# Patient Record
Sex: FEMALE | Race: BLACK OR AFRICAN AMERICAN | URBAN - METROPOLITAN AREA
[De-identification: names, ages, dates, MRNs, and addresses within clinical notes are randomized per-mention and may not be internally consistent; named-entity substitution may affect disease eponyms.]

---

## 2023-06-05 ENCOUNTER — OFFICE VISIT (OUTPATIENT)
Dept: INTERNAL MEDICINE CLINIC | Facility: CLINIC | Age: 58
End: 2023-06-05
Payer: MEDICAID

## 2023-06-05 VITALS
OXYGEN SATURATION: 98 % | WEIGHT: 184.19 LBS | DIASTOLIC BLOOD PRESSURE: 78 MMHG | HEIGHT: 66 IN | BODY MASS INDEX: 29.6 KG/M2 | HEART RATE: 86 BPM | SYSTOLIC BLOOD PRESSURE: 138 MMHG | TEMPERATURE: 97 F | RESPIRATION RATE: 16 BRPM

## 2023-06-05 DIAGNOSIS — Z96.651 STATUS POST RIGHT KNEE REPLACEMENT: Primary | ICD-10-CM

## 2023-06-05 DIAGNOSIS — G47.00 INSOMNIA, UNSPECIFIED TYPE: ICD-10-CM

## 2023-06-05 DIAGNOSIS — M79.18 CHRONIC MUSCULOSKELETAL PAIN: ICD-10-CM

## 2023-06-05 DIAGNOSIS — G89.29 CHRONIC MUSCULOSKELETAL PAIN: ICD-10-CM

## 2023-06-05 DIAGNOSIS — E11.9 TYPE 2 DIABETES MELLITUS WITHOUT COMPLICATION, WITHOUT LONG-TERM CURRENT USE OF INSULIN (HCC): ICD-10-CM

## 2023-06-05 DIAGNOSIS — K21.9 GASTROESOPHAGEAL REFLUX DISEASE, UNSPECIFIED WHETHER ESOPHAGITIS PRESENT: ICD-10-CM

## 2023-06-05 DIAGNOSIS — E78.00 HYPERCHOLESTEREMIA: ICD-10-CM

## 2023-06-05 DIAGNOSIS — I10 PRIMARY HYPERTENSION: ICD-10-CM

## 2023-06-05 DIAGNOSIS — Z86.010 HISTORY OF COLON POLYPS: ICD-10-CM

## 2023-06-05 DIAGNOSIS — M17.0 PRIMARY OSTEOARTHRITIS OF BOTH KNEES: ICD-10-CM

## 2023-06-05 DIAGNOSIS — Z12.31 ENCOUNTER FOR SCREENING MAMMOGRAM FOR MALIGNANT NEOPLASM OF BREAST: ICD-10-CM

## 2023-06-05 DIAGNOSIS — J30.2 SEASONAL ALLERGIES: ICD-10-CM

## 2023-06-05 PROBLEM — Z86.0100 HISTORY OF COLON POLYPS: Status: ACTIVE | Noted: 2023-06-05

## 2023-06-05 LAB
CARTRIDGE LOT#: 567 NUMERIC
HEMOGLOBIN A1C: 6.7 % (ref 4.3–5.6)

## 2023-06-05 RX ORDER — METFORMIN HYDROCHLORIDE 500 MG/1
500 TABLET, EXTENDED RELEASE ORAL
COMMUNITY
Start: 2023-03-30 | End: 2023-06-05

## 2023-06-05 RX ORDER — ATORVASTATIN CALCIUM 40 MG/1
40 TABLET, FILM COATED ORAL DAILY
Qty: 90 TABLET | Refills: 0 | Status: SHIPPED | OUTPATIENT
Start: 2023-06-05

## 2023-06-05 RX ORDER — CELECOXIB 100 MG/1
100 CAPSULE ORAL DAILY PRN
Qty: 30 CAPSULE | Refills: 1 | Status: SHIPPED | OUTPATIENT
Start: 2023-06-05

## 2023-06-05 RX ORDER — TRIAMTERENE AND HYDROCHLOROTHIAZIDE 37.5; 25 MG/1; MG/1
1 TABLET ORAL DAILY
COMMUNITY
Start: 2023-03-30 | End: 2023-06-05

## 2023-06-05 RX ORDER — PANTOPRAZOLE SODIUM 40 MG/1
40 TABLET, DELAYED RELEASE ORAL DAILY
Qty: 90 TABLET | Refills: 0 | Status: SHIPPED | OUTPATIENT
Start: 2023-06-05

## 2023-06-05 RX ORDER — IBUPROFEN 800 MG/1
800 TABLET ORAL DAILY
COMMUNITY
Start: 2023-05-11 | End: 2023-06-05

## 2023-06-05 RX ORDER — HYDROXYZINE PAMOATE 25 MG/1
25 CAPSULE ORAL NIGHTLY PRN
Qty: 30 CAPSULE | Refills: 0 | Status: SHIPPED | OUTPATIENT
Start: 2023-06-05

## 2023-06-05 RX ORDER — HYDROXYZINE PAMOATE 25 MG/1
25 CAPSULE ORAL NIGHTLY
COMMUNITY
Start: 2023-03-30 | End: 2023-06-05

## 2023-06-05 RX ORDER — TRIAMTERENE AND HYDROCHLOROTHIAZIDE 37.5; 25 MG/1; MG/1
1 TABLET ORAL DAILY
Qty: 30 TABLET | Refills: 0 | Status: SHIPPED | OUTPATIENT
Start: 2023-06-05

## 2023-06-05 RX ORDER — ZOLPIDEM TARTRATE 12.5 MG/1
12.5 TABLET, FILM COATED, EXTENDED RELEASE ORAL DAILY
COMMUNITY
Start: 2023-05-01 | End: 2023-06-05

## 2023-06-05 RX ORDER — GABAPENTIN 100 MG/1
200 CAPSULE ORAL 2 TIMES DAILY
COMMUNITY
Start: 2023-04-17 | End: 2023-06-05

## 2023-06-05 RX ORDER — ZOLPIDEM TARTRATE 12.5 MG/1
12.5 TABLET, FILM COATED, EXTENDED RELEASE ORAL DAILY
Qty: 30 TABLET | Refills: 0 | Status: SHIPPED | OUTPATIENT
Start: 2023-06-05

## 2023-06-05 RX ORDER — ATORVASTATIN CALCIUM 40 MG/1
40 TABLET, FILM COATED ORAL DAILY
COMMUNITY
Start: 2023-05-30 | End: 2023-06-05

## 2023-06-05 RX ORDER — ALBUTEROL SULFATE 90 UG/1
2 AEROSOL, METERED RESPIRATORY (INHALATION) 4 TIMES DAILY PRN
Qty: 1 EACH | Refills: 0 | Status: SHIPPED | OUTPATIENT
Start: 2023-06-05

## 2023-06-05 RX ORDER — HYDROCODONE POLISTIREX AND CHLORPHENIRAMINE POLISTIREX 10; 8 MG/5ML; MG/5ML
5 SUSPENSION, EXTENDED RELEASE ORAL
COMMUNITY
Start: 2022-07-17 | End: 2023-06-05

## 2023-06-05 RX ORDER — MELOXICAM 7.5 MG/1
7.5 TABLET ORAL DAILY
COMMUNITY
Start: 2023-01-04 | End: 2023-06-05

## 2023-06-05 RX ORDER — ALBUTEROL SULFATE 90 UG/1
2 AEROSOL, METERED RESPIRATORY (INHALATION) 4 TIMES DAILY PRN
COMMUNITY
Start: 2023-05-02 | End: 2023-06-05

## 2023-06-05 RX ORDER — METFORMIN HYDROCHLORIDE 500 MG/1
500 TABLET, EXTENDED RELEASE ORAL
Qty: 90 TABLET | Refills: 0 | Status: SHIPPED | OUTPATIENT
Start: 2023-06-05

## 2023-06-05 RX ORDER — CETIRIZINE HYDROCHLORIDE 10 MG/1
10 TABLET ORAL DAILY
Qty: 90 TABLET | Refills: 0 | Status: SHIPPED | OUTPATIENT
Start: 2023-06-05

## 2023-06-05 RX ORDER — BENZONATATE 100 MG/1
100 CAPSULE ORAL 3 TIMES DAILY PRN
COMMUNITY
Start: 2023-05-02 | End: 2023-06-05

## 2023-06-05 RX ORDER — GABAPENTIN 100 MG/1
200 CAPSULE ORAL 2 TIMES DAILY
Qty: 120 CAPSULE | Refills: 0 | Status: SHIPPED | OUTPATIENT
Start: 2023-06-05 | End: 2023-07-05

## 2023-06-05 RX ORDER — PANTOPRAZOLE SODIUM 40 MG/1
40 TABLET, DELAYED RELEASE ORAL DAILY
COMMUNITY
Start: 2023-03-30 | End: 2023-06-05

## 2023-06-06 ENCOUNTER — TELEPHONE (OUTPATIENT)
Dept: INTERNAL MEDICINE CLINIC | Facility: CLINIC | Age: 58
End: 2023-06-06

## 2023-06-06 RX ORDER — FLUTICASONE PROPIONATE 50 MCG
1 SPRAY, SUSPENSION (ML) NASAL DAILY
Qty: 1 EACH | Refills: 1 | Status: SHIPPED | OUTPATIENT
Start: 2023-06-06

## 2023-06-06 NOTE — TELEPHONE ENCOUNTER
Called and spoke to pt. Informed her celecoxib was sent. Advised to try this for a few days. Also recommended PT, which pt is agreeable to. Was referred at [address] yesterday. Flonase sent. Pt stated understanding and agreed to plan. She also said she tried to schedule with GI, but provider was not covered. Advised to contact insurance to find covered provider. Pt said she will call them tomorrow and call back with new GI provider.

## 2023-06-06 NOTE — TELEPHONE ENCOUNTER
Pt states that at yesterday's OV MP was supposed to send in pain medication for her legs and sciatica and flonase for her. She would like a callback.       Isaac Ennis [facility], [address] [place] [address] [phone], [phone]

## 2023-06-06 NOTE — TELEPHONE ENCOUNTER
Was there a specific medication that she is requesting? Rx sent for NSAID Celecoxib. Let's give that a try for a few days. In addition, is she interested in physical therapy? The could help too. Rx for Flonase sent.

## 2023-06-07 NOTE — TELEPHONE ENCOUNTER
[phone] they received the medication Celebrex and there is an interaction between Celebrex and Maxzide it could cause sudden onset nephro toxicity. She stated this is her 3rd call to the office.

## 2023-06-08 RX ORDER — METHYLPREDNISOLONE 4 MG/1
TABLET ORAL
Qty: 1 EACH | Refills: 0 | Status: SHIPPED | OUTPATIENT
Start: 2023-06-08 | End: 2023-06-14 | Stop reason: ALTCHOICE

## 2023-06-08 NOTE — TELEPHONE ENCOUNTER
Called and spoke to pt. Informed her medrol dose pack has been sent. Advised to f/u next week on chronic pain control. Pt said that she will be out of town and is not able to come in until 6/21.  Pt scheduled on 6/21 with [facility].

## 2023-06-08 NOTE — TELEPHONE ENCOUNTER
I spoke with pharmacist. Given interaction between her BP medication and Celebrex, let's try something else for her pain. I am still waiting for her labs/outside records to be sent over as well. I will send a Medrol DosePak for her sciatica. Can she follow up with me next week to follow up on chronic pain control, please?

## 2023-06-12 ENCOUNTER — TELEPHONE (OUTPATIENT)
Dept: ORTHOPEDICS CLINIC | Facility: CLINIC | Age: 58
End: 2023-06-12

## 2023-06-12 DIAGNOSIS — M25.562 PAIN IN BOTH KNEES, UNSPECIFIED CHRONICITY: Primary | ICD-10-CM

## 2023-06-12 DIAGNOSIS — M25.561 PAIN IN BOTH KNEES, UNSPECIFIED CHRONICITY: Primary | ICD-10-CM

## 2023-06-12 NOTE — TELEPHONE ENCOUNTER
called patient to get more information       Patient states she has bilateral knee replacement she states she gets gel injections to the left knee    She states right knee was previously replaced and had to be completed twice last SX was 2020 done in [place]. I asked patient to bring in any records and images she may have . Patient states she will give that office a call to get information     Is this appointment appropriate to see you ?

## 2023-06-12 NOTE — TELEPHONE ENCOUNTER
Patient has an upcoming appt for bilateral knee pain/ status of post right knee replacement. At this time patient has not had any imaging done, please place RX accordingly. I have notified the patient to come in 20 min prior to appointment time to have the imaging done . Please forward to the  pool to schedule imaging. Thank you.       Future Appointments   Date Time Provider Gabe Middleton   6/14/2023  3:00 PM Nato Valle,  [facility]   6/27/2023  1:30 PM Christy Young PA-C [facility] OR[facility]O  [facility]

## 2023-06-13 NOTE — TELEPHONE ENCOUNTER
xr's ordered for upcoming appointment please call patient to come at 1:00 pm to have images done first before seeing Modesta DAVID

## 2023-06-14 ENCOUNTER — LAB ENCOUNTER (OUTPATIENT)
Dept: LAB | Facility: HOSPITAL | Age: 58
End: 2023-06-14
Attending: INTERNAL MEDICINE
Payer: MEDICAID

## 2023-06-14 ENCOUNTER — OFFICE VISIT (OUTPATIENT)
Dept: INTERNAL MEDICINE CLINIC | Facility: CLINIC | Age: 58
End: 2023-06-14
Payer: MEDICAID

## 2023-06-14 VITALS
TEMPERATURE: 97 F | HEART RATE: 80 BPM | BODY MASS INDEX: 30.05 KG/M2 | WEIGHT: 187 LBS | HEIGHT: 66 IN | DIASTOLIC BLOOD PRESSURE: 68 MMHG | SYSTOLIC BLOOD PRESSURE: 102 MMHG

## 2023-06-14 DIAGNOSIS — Z13.29 SCREENING FOR THYROID DISORDER: ICD-10-CM

## 2023-06-14 DIAGNOSIS — Z13.0 SCREENING FOR BLOOD DISEASE: ICD-10-CM

## 2023-06-14 DIAGNOSIS — R25.2 MUSCLE CRAMPS: ICD-10-CM

## 2023-06-14 DIAGNOSIS — Z13.228 SCREENING FOR METABOLIC DISORDER: ICD-10-CM

## 2023-06-14 DIAGNOSIS — M79.18 CHRONIC MUSCULOSKELETAL PAIN: ICD-10-CM

## 2023-06-14 DIAGNOSIS — G89.29 CHRONIC MUSCULOSKELETAL PAIN: ICD-10-CM

## 2023-06-14 DIAGNOSIS — E11.9 TYPE 2 DIABETES MELLITUS WITHOUT COMPLICATION, WITHOUT LONG-TERM CURRENT USE OF INSULIN (HCC): ICD-10-CM

## 2023-06-14 DIAGNOSIS — M06.9 RHEUMATOID ARTHRITIS, INVOLVING UNSPECIFIED SITE, UNSPECIFIED WHETHER RHEUMATOID FACTOR PRESENT (HCC): ICD-10-CM

## 2023-06-14 DIAGNOSIS — M15.9 PRIMARY OSTEOARTHRITIS INVOLVING MULTIPLE JOINTS: ICD-10-CM

## 2023-06-14 DIAGNOSIS — Z87.39 HISTORY OF INFLAMMATORY ARTHRITIS: ICD-10-CM

## 2023-06-14 DIAGNOSIS — R10.9 ABDOMINAL PAIN, UNSPECIFIED ABDOMINAL LOCATION: ICD-10-CM

## 2023-06-14 DIAGNOSIS — R35.0 URINARY FREQUENCY: ICD-10-CM

## 2023-06-14 DIAGNOSIS — Z13.0 SCREENING FOR BLOOD DISEASE: Primary | ICD-10-CM

## 2023-06-14 DIAGNOSIS — Z86.73 HISTORY OF CVA (CEREBROVASCULAR ACCIDENT): ICD-10-CM

## 2023-06-14 LAB
ALBUMIN SERPL-MCNC: 4.1 G/DL (ref 3.4–5)
ALBUMIN/GLOB SERPL: 0.9 {RATIO} (ref 1–2)
ALP LIVER SERPL-CCNC: 108 U/L
ALT SERPL-CCNC: 19 U/L
ANION GAP SERPL CALC-SCNC: 8 MMOL/L (ref 0–18)
AST SERPL-CCNC: 7 U/L (ref 15–37)
BASOPHILS # BLD AUTO: 0.04 X10(3) UL (ref 0–0.2)
BASOPHILS NFR BLD AUTO: 0.4 %
BILIRUB SERPL-MCNC: 0.5 MG/DL (ref 0.1–2)
BILIRUB UR QL STRIP.AUTO: NEGATIVE
BUN BLD-MCNC: 13 MG/DL (ref 7–18)
CALCIUM BLD-MCNC: 9.7 MG/DL (ref 8.5–10.1)
CHLORIDE SERPL-SCNC: 102 MMOL/L (ref 98–112)
CLARITY UR REFRACT.AUTO: CLEAR
CO2 SERPL-SCNC: 26 MMOL/L (ref 21–32)
COLOR UR AUTO: YELLOW
CREAT BLD-MCNC: 0.94 MG/DL
CRP SERPL-MCNC: 2.09 MG/DL (ref ?–0.3)
EOSINOPHIL # BLD AUTO: 0.24 X10(3) UL (ref 0–0.7)
EOSINOPHIL NFR BLD AUTO: 2.2 %
ERYTHROCYTE [DISTWIDTH] IN BLOOD BY AUTOMATED COUNT: 12.9 %
ERYTHROCYTE [SEDIMENTATION RATE] IN BLOOD: 48 MM/HR
FASTING STATUS PATIENT QL REPORTED: NO
GFR SERPLBLD BASED ON 1.73 SQ M-ARVRAT: 69 ML/MIN/1.73M2 (ref 60–?)
GLOBULIN PLAS-MCNC: 4.5 G/DL (ref 2.8–4.4)
GLUCOSE BLD-MCNC: 98 MG/DL (ref 70–99)
GLUCOSE UR STRIP.AUTO-MCNC: NEGATIVE MG/DL
HCT VFR BLD AUTO: 40.3 %
HGB BLD-MCNC: 12.9 G/DL
IMM GRANULOCYTES # BLD AUTO: 0.04 X10(3) UL (ref 0–1)
IMM GRANULOCYTES NFR BLD: 0.4 %
KETONES UR STRIP.AUTO-MCNC: NEGATIVE MG/DL
LEUKOCYTE ESTERASE UR QL STRIP.AUTO: NEGATIVE
LYMPHOCYTES # BLD AUTO: 2.92 X10(3) UL (ref 1–4)
LYMPHOCYTES NFR BLD AUTO: 26.2 %
MAGNESIUM SERPL-MCNC: 2.3 MG/DL (ref 1.6–2.6)
MCH RBC QN AUTO: 27.8 PG (ref 26–34)
MCHC RBC AUTO-ENTMCNC: 32 G/DL (ref 31–37)
MCV RBC AUTO: 86.9 FL
MONOCYTES # BLD AUTO: 0.79 X10(3) UL (ref 0.1–1)
MONOCYTES NFR BLD AUTO: 7.1 %
NEUTROPHILS # BLD AUTO: 7.13 X10 (3) UL (ref 1.5–7.7)
NEUTROPHILS # BLD AUTO: 7.13 X10(3) UL (ref 1.5–7.7)
NEUTROPHILS NFR BLD AUTO: 63.7 %
NITRITE UR QL STRIP.AUTO: NEGATIVE
OSMOLALITY SERPL CALC.SUM OF ELEC: 282 MOSM/KG (ref 275–295)
PH UR STRIP.AUTO: 5 [PH] (ref 5–8)
PLATELET # BLD AUTO: 463 10(3)UL (ref 150–450)
POTASSIUM SERPL-SCNC: 3.7 MMOL/L (ref 3.5–5.1)
PROT SERPL-MCNC: 8.6 G/DL (ref 6.4–8.2)
PROT UR STRIP.AUTO-MCNC: NEGATIVE MG/DL
RBC # BLD AUTO: 4.64 X10(6)UL
RBC UR QL AUTO: NEGATIVE
RHEUMATOID FACT SERPL-ACNC: <10 IU/ML (ref ?–15)
SODIUM SERPL-SCNC: 136 MMOL/L (ref 136–145)
SP GR UR STRIP.AUTO: 1.02 (ref 1–1.03)
TSI SER-ACNC: 1.78 MIU/ML (ref 0.36–3.74)
UROBILINOGEN UR STRIP.AUTO-MCNC: <2 MG/DL
WBC # BLD AUTO: 11.2 X10(3) UL (ref 4–11)

## 2023-06-14 PROCEDURE — 83735 ASSAY OF MAGNESIUM: CPT

## 2023-06-14 PROCEDURE — 84443 ASSAY THYROID STIM HORMONE: CPT

## 2023-06-14 PROCEDURE — 86431 RHEUMATOID FACTOR QUANT: CPT

## 2023-06-14 PROCEDURE — 86038 ANTINUCLEAR ANTIBODIES: CPT

## 2023-06-14 PROCEDURE — 81003 URINALYSIS AUTO W/O SCOPE: CPT

## 2023-06-14 PROCEDURE — 86225 DNA ANTIBODY NATIVE: CPT

## 2023-06-14 PROCEDURE — 36415 COLL VENOUS BLD VENIPUNCTURE: CPT

## 2023-06-14 PROCEDURE — 85652 RBC SED RATE AUTOMATED: CPT

## 2023-06-14 PROCEDURE — 86140 C-REACTIVE PROTEIN: CPT

## 2023-06-14 PROCEDURE — 85025 COMPLETE CBC W/AUTO DIFF WBC: CPT

## 2023-06-14 PROCEDURE — 80053 COMPREHEN METABOLIC PANEL: CPT

## 2023-06-14 RX ORDER — GABAPENTIN 100 MG/1
100 CAPSULE ORAL 3 TIMES DAILY
Qty: 90 CAPSULE | Refills: 0 | Status: SHIPPED | OUTPATIENT
Start: 2023-06-14 | End: 2023-07-14

## 2023-06-14 RX ORDER — HYDROCODONE BITARTRATE AND ACETAMINOPHEN 5; 325 MG/1; MG/1
1 TABLET ORAL DAILY PRN
Qty: 15 TABLET | Refills: 0 | Status: SHIPPED | OUTPATIENT
Start: 2023-06-14

## 2023-06-14 NOTE — TELEPHONE ENCOUNTER
Future Appointments   Date Time Provider Gabe Emi   6/14/2023  3:00 PM Adali Valle, DO [facility] [facility]   6/27/2023  1:00 PM PFS XR RM1 PFS XRAY [facility]   6/27/2023  1:15 PM PFS XR RM1 PFS XRAY [facility]   6/27/2023  1:30 PM Lana Blair PA-C [facility] ORTHO PL [facility]       Spoke to patient will come in early for xray prior to appt.

## 2023-06-15 ENCOUNTER — TELEPHONE (OUTPATIENT)
Dept: INTERNAL MEDICINE CLINIC | Facility: CLINIC | Age: 58
End: 2023-06-15

## 2023-06-15 LAB
DSDNA IGG SERPL IA-ACNC: 1.5 IU/ML
ENA AB SER QL IA: 0.2 UG/L
ENA AB SER QL IA: NEGATIVE

## 2023-06-16 ENCOUNTER — TELEPHONE (OUTPATIENT)
Dept: INTERNAL MEDICINE CLINIC | Facility: CLINIC | Age: 58
End: 2023-06-16

## 2023-06-17 PROBLEM — M15.9 PRIMARY OSTEOARTHRITIS INVOLVING MULTIPLE JOINTS: Status: ACTIVE | Noted: 2023-06-17

## 2023-06-17 PROBLEM — Z86.73 HISTORY OF CVA (CEREBROVASCULAR ACCIDENT): Status: ACTIVE | Noted: 2023-06-17

## 2023-06-17 PROBLEM — Z87.39 HISTORY OF INFLAMMATORY ARTHRITIS: Status: ACTIVE | Noted: 2023-06-17

## 2023-06-17 PROBLEM — M15.0 PRIMARY OSTEOARTHRITIS INVOLVING MULTIPLE JOINTS: Status: ACTIVE | Noted: 2023-06-17

## 2023-06-26 ENCOUNTER — OFFICE VISIT (OUTPATIENT)
Dept: INTERNAL MEDICINE CLINIC | Facility: CLINIC | Age: 58
End: 2023-06-26
Payer: MEDICAID

## 2023-06-26 VITALS
TEMPERATURE: 98 F | DIASTOLIC BLOOD PRESSURE: 64 MMHG | RESPIRATION RATE: 16 BRPM | OXYGEN SATURATION: 96 % | HEART RATE: 87 BPM | BODY MASS INDEX: 29.67 KG/M2 | WEIGHT: 184.63 LBS | HEIGHT: 66 IN | SYSTOLIC BLOOD PRESSURE: 126 MMHG

## 2023-06-26 DIAGNOSIS — R53.83 OTHER FATIGUE: ICD-10-CM

## 2023-06-26 DIAGNOSIS — E11.9 TYPE 2 DIABETES MELLITUS WITHOUT COMPLICATION, WITHOUT LONG-TERM CURRENT USE OF INSULIN (HCC): ICD-10-CM

## 2023-06-26 DIAGNOSIS — R77.8 ELEVATED TOTAL PROTEIN: ICD-10-CM

## 2023-06-26 DIAGNOSIS — D72.829 LEUKOCYTOSIS, UNSPECIFIED TYPE: ICD-10-CM

## 2023-06-26 DIAGNOSIS — D69.6 THROMBOCYTOPENIA (HCC): ICD-10-CM

## 2023-06-26 DIAGNOSIS — Z86.39 HISTORY OF VITAMIN D DEFICIENCY: ICD-10-CM

## 2023-06-26 DIAGNOSIS — K57.92 DIVERTICULITIS: Primary | ICD-10-CM

## 2023-06-26 DIAGNOSIS — Z12.11 SCREEN FOR COLON CANCER: ICD-10-CM

## 2023-06-26 DIAGNOSIS — Z86.39 HISTORY OF IRON DEFICIENCY: ICD-10-CM

## 2023-06-26 PROCEDURE — 3008F BODY MASS INDEX DOCD: CPT | Performed by: INTERNAL MEDICINE

## 2023-06-26 PROCEDURE — 99214 OFFICE O/P EST MOD 30 MIN: CPT | Performed by: INTERNAL MEDICINE

## 2023-06-26 PROCEDURE — 3078F DIAST BP <80 MM HG: CPT | Performed by: INTERNAL MEDICINE

## 2023-06-26 PROCEDURE — 3074F SYST BP LT 130 MM HG: CPT | Performed by: INTERNAL MEDICINE

## 2023-06-26 RX ORDER — SENNA AND DOCUSATE SODIUM 50; 8.6 MG/1; MG/1
1 TABLET, FILM COATED ORAL DAILY
Qty: 90 TABLET | Refills: 0 | Status: SHIPPED | OUTPATIENT
Start: 2023-06-26

## 2023-06-26 RX ORDER — AMOXICILLIN AND CLAVULANATE POTASSIUM 875; 125 MG/1; MG/1
1 TABLET, FILM COATED ORAL EVERY 8 HOURS
COMMUNITY
Start: 2023-06-19

## 2023-06-27 ENCOUNTER — LAB ENCOUNTER (OUTPATIENT)
Dept: LAB | Age: 58
End: 2023-06-27
Attending: INTERNAL MEDICINE
Payer: MEDICAID

## 2023-06-27 ENCOUNTER — OFFICE VISIT (OUTPATIENT)
Facility: CLINIC | Age: 58
End: 2023-06-27
Payer: MEDICAID

## 2023-06-27 ENCOUNTER — HOSPITAL ENCOUNTER (OUTPATIENT)
Dept: GENERAL RADIOLOGY | Age: 58
Discharge: HOME | End: 2023-06-27
Attending: PHYSICIAN ASSISTANT
Payer: MEDICAID

## 2023-06-27 VITALS — WEIGHT: 184 LBS | BODY MASS INDEX: 29.57 KG/M2 | HEIGHT: 66 IN

## 2023-06-27 DIAGNOSIS — E11.9 TYPE 2 DIABETES MELLITUS WITHOUT COMPLICATION, WITHOUT LONG-TERM CURRENT USE OF INSULIN (HCC): ICD-10-CM

## 2023-06-27 DIAGNOSIS — M17.12 PRIMARY OSTEOARTHRITIS OF LEFT KNEE: Primary | ICD-10-CM

## 2023-06-27 DIAGNOSIS — M25.562 PAIN IN BOTH KNEES, UNSPECIFIED CHRONICITY: ICD-10-CM

## 2023-06-27 DIAGNOSIS — Z86.39 HISTORY OF IRON DEFICIENCY: ICD-10-CM

## 2023-06-27 DIAGNOSIS — R53.83 OTHER FATIGUE: ICD-10-CM

## 2023-06-27 DIAGNOSIS — G89.29 CHRONIC KNEE PAIN AFTER TOTAL REPLACEMENT OF RIGHT KNEE JOINT: ICD-10-CM

## 2023-06-27 DIAGNOSIS — M25.561 CHRONIC KNEE PAIN AFTER TOTAL REPLACEMENT OF RIGHT KNEE JOINT: ICD-10-CM

## 2023-06-27 DIAGNOSIS — M25.561 PAIN IN BOTH KNEES, UNSPECIFIED CHRONICITY: ICD-10-CM

## 2023-06-27 DIAGNOSIS — Z86.39 HISTORY OF VITAMIN D DEFICIENCY: ICD-10-CM

## 2023-06-27 DIAGNOSIS — Z96.651 CHRONIC KNEE PAIN AFTER TOTAL REPLACEMENT OF RIGHT KNEE JOINT: ICD-10-CM

## 2023-06-27 LAB
CREAT UR-SCNC: 200 MG/DL
DEPRECATED HBV CORE AB SER IA-ACNC: 49 NG/ML
IRON SATN MFR SERPL: 20 %
IRON SERPL-MCNC: 69 UG/DL
MICROALBUMIN UR-MCNC: 0.96 MG/DL
MICROALBUMIN/CREAT 24H UR-RTO: 4.8 UG/MG (ref ?–30)
TIBC SERPL-MCNC: 343 UG/DL (ref 240–450)
TRANSFERRIN SERPL-MCNC: 230 MG/DL (ref 200–360)
VIT B12 SERPL-MCNC: 636 PG/ML (ref 193–986)

## 2023-06-27 PROCEDURE — 20610 DRAIN/INJ JOINT/BURSA W/O US: CPT | Performed by: PHYSICIAN ASSISTANT

## 2023-06-27 PROCEDURE — 73564 X-RAY EXAM KNEE 4 OR MORE: CPT | Performed by: PHYSICIAN ASSISTANT

## 2023-06-27 PROCEDURE — 83550 IRON BINDING TEST: CPT

## 2023-06-27 PROCEDURE — 82728 ASSAY OF FERRITIN: CPT

## 2023-06-27 PROCEDURE — 99204 OFFICE O/P NEW MOD 45 MIN: CPT | Performed by: PHYSICIAN ASSISTANT

## 2023-06-27 PROCEDURE — 36415 COLL VENOUS BLD VENIPUNCTURE: CPT

## 2023-06-27 PROCEDURE — 73562 X-RAY EXAM OF KNEE 3: CPT | Performed by: PHYSICIAN ASSISTANT

## 2023-06-27 PROCEDURE — 82043 UR ALBUMIN QUANTITATIVE: CPT

## 2023-06-27 PROCEDURE — 82306 VITAMIN D 25 HYDROXY: CPT

## 2023-06-27 PROCEDURE — 3008F BODY MASS INDEX DOCD: CPT | Performed by: PHYSICIAN ASSISTANT

## 2023-06-27 PROCEDURE — 82607 VITAMIN B-12: CPT

## 2023-06-27 PROCEDURE — 3061F NEG MICROALBUMINURIA REV: CPT | Performed by: INTERNAL MEDICINE

## 2023-06-27 PROCEDURE — 83540 ASSAY OF IRON: CPT

## 2023-06-27 PROCEDURE — 82570 ASSAY OF URINE CREATININE: CPT

## 2023-06-27 RX ORDER — TRIAMCINOLONE ACETONIDE 40 MG/ML
40 INJECTION, SUSPENSION INTRA-ARTICULAR; INTRAMUSCULAR ONCE
Status: COMPLETED | OUTPATIENT
Start: 2023-06-27 | End: 2023-06-27

## 2023-06-27 RX ADMIN — TRIAMCINOLONE ACETONIDE 40 MG: 40 INJECTION, SUSPENSION INTRA-ARTICULAR; INTRAMUSCULAR at 14:23:00

## 2023-06-28 DIAGNOSIS — E55.9 VITAMIN D DEFICIENCY: Primary | ICD-10-CM

## 2023-06-28 LAB — VIT D+METAB SERPL-MCNC: 16.7 NG/ML (ref 30–100)

## 2023-06-28 RX ORDER — ERGOCALCIFEROL 1.25 MG/1
50000 CAPSULE ORAL WEEKLY
Qty: 12 CAPSULE | Refills: 0 | Status: SHIPPED | OUTPATIENT
Start: 2023-06-28 | End: 2023-09-14

## 2023-06-29 ENCOUNTER — TELEPHONE (OUTPATIENT)
Dept: INTERNAL MEDICINE CLINIC | Facility: CLINIC | Age: 58
End: 2023-06-29

## 2023-06-30 ENCOUNTER — DOCUMENTATION ONLY (OUTPATIENT)
Dept: INTERNAL MEDICINE CLINIC | Facility: CLINIC | Age: 58
End: 2023-06-30

## 2023-07-05 ENCOUNTER — TELEPHONE (OUTPATIENT)
Dept: INTERNAL MEDICINE CLINIC | Facility: CLINIC | Age: 58
End: 2023-07-05

## 2023-07-05 NOTE — TELEPHONE ENCOUNTER
Disp Refills Start End    atorvastatin 40 MG Oral Tab 90 tablet 0 6/5/2023     Sig - Route: Take 1 tablet (40 mg total) by mouth daily. - Oral    Sent to pharmacy as:  Atorvastatin Calcium 40 MG Oral Tablet (Lipitor)    E-Prescribing Status: Receipt confirmed by pharmacy (6/5/2023  4:15 PM CDT)    Prior authorization: Waiting for Auth Details

## 2023-07-13 ENCOUNTER — OFFICE VISIT (OUTPATIENT)
Dept: HEMATOLOGY/ONCOLOGY | Facility: HOSPITAL | Age: 58
End: 2023-07-13
Attending: INTERNAL MEDICINE
Payer: MEDICAID

## 2023-07-13 VITALS
HEIGHT: 65.98 IN | BODY MASS INDEX: 29.76 KG/M2 | SYSTOLIC BLOOD PRESSURE: 118 MMHG | HEART RATE: 66 BPM | TEMPERATURE: 97 F | DIASTOLIC BLOOD PRESSURE: 76 MMHG | WEIGHT: 185.19 LBS | OXYGEN SATURATION: 98 % | RESPIRATION RATE: 18 BRPM

## 2023-07-13 DIAGNOSIS — K59.00 CONSTIPATION, UNSPECIFIED CONSTIPATION TYPE: ICD-10-CM

## 2023-07-13 DIAGNOSIS — D75.839 THROMBOCYTOSIS: ICD-10-CM

## 2023-07-13 DIAGNOSIS — Z71.6 ENCOUNTER FOR SMOKING CESSATION COUNSELING: ICD-10-CM

## 2023-07-13 DIAGNOSIS — D72.829 LEUKOCYTOSIS: Primary | ICD-10-CM

## 2023-07-13 PROCEDURE — 99205 OFFICE O/P NEW HI 60 MIN: CPT | Performed by: INTERNAL MEDICINE

## 2023-07-13 NOTE — PROGRESS NOTES
Education Record    Learner:  Patient and Family Member    Disease / Diagnosis:    Barriers / Limitations:  None   Comments:    Method:  Discussion   Comments:    General Topics:  Pain, Side effects and symptom management, and Plan of care reviewed   Comments:    Outcome:  Shows understanding   Comments:    Here for new consult. Currently her only complaint is abdominal pain, constipation. She has had a lot of abdominal symptoms lately- she even went to the ER and had Diverticulitis.

## 2023-07-17 ENCOUNTER — TELEPHONE (OUTPATIENT)
Dept: PHYSICAL THERAPY | Facility: HOSPITAL | Age: 58
End: 2023-07-17

## 2023-07-17 ENCOUNTER — OFFICE VISIT (OUTPATIENT)
Facility: LOCATION | Age: 58
End: 2023-07-17
Payer: MEDICAID

## 2023-07-17 VITALS — HEART RATE: 80 BPM | TEMPERATURE: 97 F

## 2023-07-17 DIAGNOSIS — R10.31 RLQ ABDOMINAL PAIN: Primary | ICD-10-CM

## 2023-07-17 DIAGNOSIS — K59.00 CONSTIPATION, UNSPECIFIED CONSTIPATION TYPE: ICD-10-CM

## 2023-07-17 RX ORDER — POLYETHYLENE GLYCOL 3350, SODIUM CHLORIDE, SODIUM BICARBONATE, POTASSIUM CHLORIDE 420; 11.2; 5.72; 1.48 G/4L; G/4L; G/4L; G/4L
POWDER, FOR SOLUTION ORAL
Qty: 1 EACH | Refills: 0 | Status: SHIPPED | OUTPATIENT
Start: 2023-07-17

## 2023-07-19 ENCOUNTER — TELEPHONE (OUTPATIENT)
Facility: LOCATION | Age: 58
End: 2023-07-19

## 2023-07-19 RX ORDER — AMOXICILLIN AND CLAVULANATE POTASSIUM 875; 125 MG/1; MG/1
1 TABLET, FILM COATED ORAL 2 TIMES DAILY
Qty: 20 TABLET | Refills: 0 | Status: ON HOLD | OUTPATIENT
Start: 2023-07-19 | End: 2023-07-29

## 2023-07-19 NOTE — TELEPHONE ENCOUNTER
S/w pt and pt's daughter Zeferino Narvaez for a follow up after receiving records from Emergency room visit on 7/18. Per Dr Casandra Stauffer patient does not need  complete scheduled CT of abd and pelvis d/t having one completed at the ER. Pt should remain on a low residual diet and complete the course of antibiotics prescribed at the ER and disregard Augmentin rx sent by Dr. Casandra Stauffer. Verbalized understanding.      Future Appointments   Date Time Provider Gabe Midldeton   7/21/2023  9:15 AM BBK CT 1 BBK CT [place]   8/9/2023  2:15 PM Terence Allen, PT [facility]   8/11/2023 10:30 AM Manoj Garrison, PT [facility]   8/14/2023  2:15 PM Terence Allen, PT [facility] PHYS [address]   8/16/2023 10:15 AM Claudine Da Silva MD [facility]   8/16/2023  2:15 PM Terence Allen, PT [facility]   8/21/2023  2:15 PM Terence Allen, PT [facility] PHYS [address]

## 2023-07-19 NOTE — TELEPHONE ENCOUNTER
Pt called because she went into [facility] ER, and she wants us to get a copy of her medical records. Gave number for [phone] to request the notes from her ER visit.

## 2023-07-20 ENCOUNTER — TELEPHONE (OUTPATIENT)
Dept: INTERNAL MEDICINE CLINIC | Facility: CLINIC | Age: 58
End: 2023-07-20

## 2023-07-20 NOTE — TELEPHONE ENCOUNTER
I think that's on old ER note. The signature is from 2022.  I received record in my basket yesterday with CT scan but no Rx for Norco.

## 2023-07-20 NOTE — TELEPHONE ENCOUNTER
Patient was in the ER on Tuesday and was given a script for [place].    Her [facility] was out of the [place] 5 mg. Would  send a prescription for Norco 10 mg be sent over to the [facility] by her in [address]? ?

## 2023-07-20 NOTE — TELEPHONE ENCOUNTER
Misread date on ER note in care everywhere, note is from 7/17/22, not 23. LMTCB 7/20 to find out why she was in ER and where as do not see in care everywhere.

## 2023-07-20 NOTE — TELEPHONE ENCOUNTER
Found ER note in care everywhere:    ----------------------- IMPRESSION AND DISPOSITION --------------------    CLINICAL IMPRESSION  1. Coronavirus infection  2. Screening for cardiovascular condition  3. Atypical pneumonia  4. Abdominal pain, unspecified abdominal location    DISPOSITION  Discharge to home    Prescriptions:  New Prescriptions  ALBUTEROL 90 MCG/ACTUATION INHL INHALER Inhale 2 puffs into the lungs every 4 (four) hours as needed for Wheezing or Shortness of Breath. DOXYCYCLINE (VIBRAMYCIN) 100 MG CAPSULE Take 1 capsule (100 mg total) by mouth 2 (two) times daily for 10 days. HYDROCODONE-CHLORPHENIRAMINE (TUSSIONEX) 10-8 MG/5 ML SU12 Take 5 mLs by mouth 2 (two) times daily as needed for Cough. ONDANSETRON (ZOFRAN-ODT) 4 MG DISINTEGRATING TABLET Take 1 tablet (4 mg total) by mouth every 8 (eight) hours as needed for Nausea. Patient condition:  Stable      [id number] [facility], DO  08/02/22 0817      Do not see Fronie Kite was prescribed, so called [facility] to see if there was rx. Per [facility] only rx they have is for abx, but pain meds may not have been entered as they are on backorder. They are out of 5 mg and 10 mg Norco.     MP, looked through ER note in care everywhere and do not see Fronie Kite was prescribed. Do you see anything differently?

## 2023-07-21 ENCOUNTER — APPOINTMENT (OUTPATIENT)
Dept: CT IMAGING | Facility: HOSPITAL | Age: 58
DRG: 392 | End: 2023-07-21
Attending: EMERGENCY MEDICINE
Payer: MEDICAID

## 2023-07-21 ENCOUNTER — HOSPITAL ENCOUNTER (INPATIENT)
Facility: HOSPITAL | Age: 58
LOS: 5 days | Discharge: HOME | DRG: 392 | End: 2023-07-26
Attending: EMERGENCY MEDICINE | Admitting: INTERNAL MEDICINE
Payer: MEDICAID

## 2023-07-21 DIAGNOSIS — K57.92 ACUTE DIVERTICULITIS: Primary | ICD-10-CM

## 2023-07-21 DIAGNOSIS — M15.9 PRIMARY OSTEOARTHRITIS INVOLVING MULTIPLE JOINTS: ICD-10-CM

## 2023-07-21 DIAGNOSIS — G89.29 CHRONIC MUSCULOSKELETAL PAIN: ICD-10-CM

## 2023-07-21 DIAGNOSIS — M79.18 CHRONIC MUSCULOSKELETAL PAIN: ICD-10-CM

## 2023-07-21 LAB
ALBUMIN SERPL-MCNC: 4.1 G/DL (ref 3.4–5)
ALBUMIN/GLOB SERPL: 0.9 {RATIO} (ref 1–2)
ALP LIVER SERPL-CCNC: 101 U/L
ALT SERPL-CCNC: 14 U/L
ANION GAP SERPL CALC-SCNC: 7 MMOL/L (ref 0–18)
AST SERPL-CCNC: 13 U/L (ref 15–37)
ATRIAL RATE: 56 BPM
BASOPHILS # BLD AUTO: 0.03 X10(3) UL (ref 0–0.2)
BASOPHILS NFR BLD AUTO: 0.3 %
BILIRUB SERPL-MCNC: 0.8 MG/DL (ref 0.1–2)
BILIRUB UR QL STRIP.AUTO: NEGATIVE
BUN BLD-MCNC: 15 MG/DL (ref 7–18)
CALCIUM BLD-MCNC: 10.1 MG/DL (ref 8.5–10.1)
CHLORIDE SERPL-SCNC: 101 MMOL/L (ref 98–112)
CO2 SERPL-SCNC: 26 MMOL/L (ref 21–32)
COLOR UR AUTO: YELLOW
CREAT BLD-MCNC: 1.02 MG/DL
EGFRCR SERPLBLD CKD-EPI 2021: 63 ML/MIN/1.73M2 (ref 60–?)
EOSINOPHIL # BLD AUTO: 0.15 X10(3) UL (ref 0–0.7)
EOSINOPHIL NFR BLD AUTO: 1.6 %
ERYTHROCYTE [DISTWIDTH] IN BLOOD BY AUTOMATED COUNT: 13.2 %
GLOBULIN PLAS-MCNC: 4.6 G/DL (ref 2.8–4.4)
GLUCOSE BLD-MCNC: 119 MG/DL (ref 70–99)
GLUCOSE BLD-MCNC: 141 MG/DL (ref 70–99)
GLUCOSE UR STRIP.AUTO-MCNC: NEGATIVE MG/DL
HCT VFR BLD AUTO: 39.8 %
HGB BLD-MCNC: 12.5 G/DL
HYALINE CASTS #/AREA URNS AUTO: PRESENT /LPF
IMM GRANULOCYTES # BLD AUTO: 0.02 X10(3) UL (ref 0–1)
IMM GRANULOCYTES NFR BLD: 0.2 %
KETONES UR STRIP.AUTO-MCNC: NEGATIVE MG/DL
LIPASE SERPL-CCNC: 24 U/L (ref 13–75)
LYMPHOCYTES # BLD AUTO: 2.22 X10(3) UL (ref 1–4)
LYMPHOCYTES NFR BLD AUTO: 23.9 %
MCH RBC QN AUTO: 27.1 PG (ref 26–34)
MCHC RBC AUTO-ENTMCNC: 31.4 G/DL (ref 31–37)
MCV RBC AUTO: 86.1 FL
MONOCYTES # BLD AUTO: 0.62 X10(3) UL (ref 0.1–1)
MONOCYTES NFR BLD AUTO: 6.7 %
NEUTROPHILS # BLD AUTO: 6.26 X10 (3) UL (ref 1.5–7.7)
NEUTROPHILS # BLD AUTO: 6.26 X10(3) UL (ref 1.5–7.7)
NEUTROPHILS NFR BLD AUTO: 67.3 %
NITRITE UR QL STRIP.AUTO: NEGATIVE
OSMOLALITY SERPL CALC.SUM OF ELEC: 280 MOSM/KG (ref 275–295)
P AXIS: 41 DEGREES
P-R INTERVAL: 160 MS
PH UR STRIP.AUTO: 6 [PH] (ref 5–8)
PLATELET # BLD AUTO: 382 10(3)UL (ref 150–450)
POTASSIUM SERPL-SCNC: 4.1 MMOL/L (ref 3.5–5.1)
PROT SERPL-MCNC: 8.7 G/DL (ref 6.4–8.2)
PROT UR STRIP.AUTO-MCNC: NEGATIVE MG/DL
Q-T INTERVAL: 432 MS
QRS DURATION: 86 MS
QTC CALCULATION (BEZET): 416 MS
R AXIS: 7 DEGREES
RBC # BLD AUTO: 4.62 X10(6)UL
RBC UR QL AUTO: NEGATIVE
SODIUM SERPL-SCNC: 134 MMOL/L (ref 136–145)
SP GR UR STRIP.AUTO: 1.02 (ref 1–1.03)
T AXIS: 53 DEGREES
UROBILINOGEN UR STRIP.AUTO-MCNC: <2 MG/DL
VENTRICULAR RATE: 56 BPM
WBC # BLD AUTO: 9.3 X10(3) UL (ref 4–11)

## 2023-07-21 PROCEDURE — 74177 CT ABD & PELVIS W/CONTRAST: CPT | Performed by: EMERGENCY MEDICINE

## 2023-07-21 PROCEDURE — 99222 1ST HOSP IP/OBS MODERATE 55: CPT | Performed by: INTERNAL MEDICINE

## 2023-07-21 RX ORDER — HYDROMORPHONE HYDROCHLORIDE 1 MG/ML
0.8 INJECTION, SOLUTION INTRAMUSCULAR; INTRAVENOUS; SUBCUTANEOUS EVERY 2 HOUR PRN
Status: DISCONTINUED | OUTPATIENT
Start: 2023-07-21 | End: 2023-07-23

## 2023-07-21 RX ORDER — HYDROMORPHONE HYDROCHLORIDE 1 MG/ML
0.2 INJECTION, SOLUTION INTRAMUSCULAR; INTRAVENOUS; SUBCUTANEOUS EVERY 2 HOUR PRN
Status: DISCONTINUED | OUTPATIENT
Start: 2023-07-21 | End: 2023-07-23

## 2023-07-21 RX ORDER — HYDROMORPHONE HYDROCHLORIDE 1 MG/ML
0.5 INJECTION, SOLUTION INTRAMUSCULAR; INTRAVENOUS; SUBCUTANEOUS EVERY 30 MIN PRN
Status: DISCONTINUED | OUTPATIENT
Start: 2023-07-21 | End: 2023-07-21

## 2023-07-21 RX ORDER — DEXTROSE 5 % IN WATER
PIGGYBACK WITH THREADED PORT (ML) INTRAVENOUS
Status: COMPLETED
Start: 2023-07-21 | End: 2023-07-21

## 2023-07-21 RX ORDER — HYDROMORPHONE HYDROCHLORIDE 1 MG/ML
0.4 INJECTION, SOLUTION INTRAMUSCULAR; INTRAVENOUS; SUBCUTANEOUS EVERY 2 HOUR PRN
Status: DISCONTINUED | OUTPATIENT
Start: 2023-07-21 | End: 2023-07-23

## 2023-07-21 RX ORDER — ENOXAPARIN SODIUM 100 MG/ML
40 INJECTION SUBCUTANEOUS DAILY
Status: DISCONTINUED | OUTPATIENT
Start: 2023-07-22 | End: 2023-07-26

## 2023-07-21 RX ORDER — PIPERACILLIN SODIUM, TAZOBACTAM SODIUM 3; .375 G/15ML; G/15ML
INJECTION, POWDER, LYOPHILIZED, FOR SOLUTION INTRAVENOUS
Status: DISCONTINUED
Start: 2023-07-21 | End: 2023-07-21 | Stop reason: WASHOUT

## 2023-07-21 RX ORDER — ONDANSETRON 2 MG/ML
4 INJECTION INTRAMUSCULAR; INTRAVENOUS ONCE
Status: COMPLETED | OUTPATIENT
Start: 2023-07-21 | End: 2023-07-21

## 2023-07-21 RX ORDER — NICOTINE POLACRILEX 4 MG
15 LOZENGE BUCCAL
Status: DISCONTINUED | OUTPATIENT
Start: 2023-07-21 | End: 2023-07-26

## 2023-07-21 RX ORDER — MELATONIN
3 NIGHTLY PRN
Status: DISCONTINUED | OUTPATIENT
Start: 2023-07-21 | End: 2023-07-26

## 2023-07-21 RX ORDER — ONDANSETRON 2 MG/ML
4 INJECTION INTRAMUSCULAR; INTRAVENOUS EVERY 6 HOURS PRN
Status: DISCONTINUED | OUTPATIENT
Start: 2023-07-21 | End: 2023-07-26

## 2023-07-21 RX ORDER — NICOTINE POLACRILEX 4 MG
30 LOZENGE BUCCAL
Status: DISCONTINUED | OUTPATIENT
Start: 2023-07-21 | End: 2023-07-26

## 2023-07-21 RX ORDER — DEXTROSE MONOHYDRATE 25 G/50ML
50 INJECTION, SOLUTION INTRAVENOUS
Status: DISCONTINUED | OUTPATIENT
Start: 2023-07-21 | End: 2023-07-26

## 2023-07-21 RX ORDER — SODIUM CHLORIDE 9 MG/ML
INJECTION, SOLUTION INTRAVENOUS CONTINUOUS
Status: DISCONTINUED | OUTPATIENT
Start: 2023-07-21 | End: 2023-07-26

## 2023-07-21 NOTE — ED INITIAL ASSESSMENT (HPI)
Pt here with abdominal pain x 1 month, pt was seen [facility], dx with diverticulitis, pt states feeling worse today. Pt reports dizziness starting last night.

## 2023-07-21 NOTE — TELEPHONE ENCOUNTER
Defer to [facility]? Other meds sent by [address] on file, last seen by [facility] for these symptoms 7/17/2023? Unable to locate ED notes.

## 2023-07-21 NOTE — TELEPHONE ENCOUNTER
Patient going to ED in [place] at this time as her symptoms are worsening, and pain uncontrolled.  Asked for update to be sent to [facility].

## 2023-07-21 NOTE — TELEPHONE ENCOUNTER
If possible yes see if she can get in to see Dr. Radha Tomlin. I have the ED note with me. It was placed in my basket.

## 2023-07-21 NOTE — TELEPHONE ENCOUNTER
[facility]    Patient states that her Diverticulitis was flaring up and she still doesn't feel good today. Last night she had a lot of nausea. Patient returning triage call.

## 2023-07-22 LAB
ANION GAP SERPL CALC-SCNC: 7 MMOL/L (ref 0–18)
BASOPHILS # BLD AUTO: 0.03 X10(3) UL (ref 0–0.2)
BASOPHILS NFR BLD AUTO: 0.4 %
BUN BLD-MCNC: 10 MG/DL (ref 7–18)
CALCIUM BLD-MCNC: 9 MG/DL (ref 8.5–10.1)
CHLORIDE SERPL-SCNC: 107 MMOL/L (ref 98–112)
CO2 SERPL-SCNC: 24 MMOL/L (ref 21–32)
CREAT BLD-MCNC: 0.94 MG/DL
EGFRCR SERPLBLD CKD-EPI 2021: 69 ML/MIN/1.73M2 (ref 60–?)
EOSINOPHIL # BLD AUTO: 0.18 X10(3) UL (ref 0–0.7)
EOSINOPHIL NFR BLD AUTO: 2.2 %
ERYTHROCYTE [DISTWIDTH] IN BLOOD BY AUTOMATED COUNT: 13 %
GLUCOSE BLD-MCNC: 104 MG/DL (ref 70–99)
GLUCOSE BLD-MCNC: 109 MG/DL (ref 70–99)
GLUCOSE BLD-MCNC: 113 MG/DL (ref 70–99)
GLUCOSE BLD-MCNC: 118 MG/DL (ref 70–99)
GLUCOSE BLD-MCNC: 98 MG/DL (ref 70–99)
HCT VFR BLD AUTO: 34.8 %
HGB BLD-MCNC: 11.3 G/DL
IMM GRANULOCYTES # BLD AUTO: 0.02 X10(3) UL (ref 0–1)
IMM GRANULOCYTES NFR BLD: 0.2 %
LYMPHOCYTES # BLD AUTO: 2.05 X10(3) UL (ref 1–4)
LYMPHOCYTES NFR BLD AUTO: 24.9 %
MCH RBC QN AUTO: 27.8 PG (ref 26–34)
MCHC RBC AUTO-ENTMCNC: 32.5 G/DL (ref 31–37)
MCV RBC AUTO: 85.7 FL
MONOCYTES # BLD AUTO: 0.55 X10(3) UL (ref 0.1–1)
MONOCYTES NFR BLD AUTO: 6.7 %
NEUTROPHILS # BLD AUTO: 5.4 X10 (3) UL (ref 1.5–7.7)
NEUTROPHILS # BLD AUTO: 5.4 X10(3) UL (ref 1.5–7.7)
NEUTROPHILS NFR BLD AUTO: 65.6 %
OSMOLALITY SERPL CALC.SUM OF ELEC: 286 MOSM/KG (ref 275–295)
PLATELET # BLD AUTO: 332 10(3)UL (ref 150–450)
POTASSIUM SERPL-SCNC: 3.6 MMOL/L (ref 3.5–5.1)
RBC # BLD AUTO: 4.06 X10(6)UL
SODIUM SERPL-SCNC: 138 MMOL/L (ref 136–145)
WBC # BLD AUTO: 8.2 X10(3) UL (ref 4–11)

## 2023-07-22 PROCEDURE — 99233 SBSQ HOSP IP/OBS HIGH 50: CPT | Performed by: HOSPITALIST

## 2023-07-22 RX ORDER — ALBUTEROL SULFATE 90 UG/1
2 AEROSOL, METERED RESPIRATORY (INHALATION) 4 TIMES DAILY PRN
Status: DISCONTINUED | OUTPATIENT
Start: 2023-07-22 | End: 2023-07-26

## 2023-07-22 RX ORDER — PANTOPRAZOLE SODIUM 40 MG/1
40 TABLET, DELAYED RELEASE ORAL DAILY
Status: DISCONTINUED | OUTPATIENT
Start: 2023-07-22 | End: 2023-07-26

## 2023-07-22 RX ORDER — CETIRIZINE HYDROCHLORIDE 10 MG/1
10 TABLET ORAL DAILY
Status: DISCONTINUED | OUTPATIENT
Start: 2023-07-22 | End: 2023-07-26

## 2023-07-22 RX ORDER — FLUTICASONE PROPIONATE 50 MCG
1 SPRAY, SUSPENSION (ML) NASAL DAILY
Status: DISCONTINUED | OUTPATIENT
Start: 2023-07-22 | End: 2023-07-26

## 2023-07-22 RX ORDER — ZOLPIDEM TARTRATE 5 MG/1
5 TABLET ORAL NIGHTLY PRN
Status: DISCONTINUED | OUTPATIENT
Start: 2023-07-22 | End: 2023-07-26

## 2023-07-22 RX ORDER — HYDROCODONE BITARTRATE AND ACETAMINOPHEN 5; 325 MG/1; MG/1
1 TABLET ORAL EVERY 4 HOURS PRN
Status: DISCONTINUED | OUTPATIENT
Start: 2023-07-22 | End: 2023-07-23

## 2023-07-22 RX ORDER — HYDROCODONE BITARTRATE AND ACETAMINOPHEN 5; 325 MG/1; MG/1
1 TABLET ORAL DAILY PRN
Status: DISCONTINUED | OUTPATIENT
Start: 2023-07-22 | End: 2023-07-22

## 2023-07-22 RX ORDER — TRIAMTERENE AND HYDROCHLOROTHIAZIDE 37.5; 25 MG/1; MG/1
1 CAPSULE ORAL DAILY
Status: DISCONTINUED | OUTPATIENT
Start: 2023-07-22 | End: 2023-07-26

## 2023-07-22 RX ORDER — ATORVASTATIN CALCIUM 40 MG/1
40 TABLET, FILM COATED ORAL DAILY
Status: DISCONTINUED | OUTPATIENT
Start: 2023-07-22 | End: 2023-07-26

## 2023-07-22 RX ORDER — SENNA AND DOCUSATE SODIUM 50; 8.6 MG/1; MG/1
1 TABLET, FILM COATED ORAL DAILY
Status: DISCONTINUED | OUTPATIENT
Start: 2023-07-22 | End: 2023-07-26

## 2023-07-22 NOTE — SLP NOTE
ADULT SWALLOWING EVALUATION    ASSESSMENT    ASSESSMENT/OVERALL IMPRESSION:  The patient is a 59year old female with past medical history significant for IDDM2, HTN who presented to the ED on 07/21/23 with left lower quadrant abdominal pain x3 days. The patient was seen at [facility] ED 3 days ago and was diagnosed with diverticulitis and was given oral antibiotics with no improvement noted at home, prompting visit here. The patient is currently on a clear liquid diet, per Dr. Ryan Barry. Oral mechanism is unremarkable. The patient wears upper and lower dentures, which are here at the hospital. The patient reports baseline GERD which worsens with position change, particularly when bending forward. The patient demonstrates adequate oral control with thin liquid trials. Suspect timely swallow response and adequate laryngeal elevation to palpation. No overt signs of aspiration noted. SLP will continue to follow and further assess solids when medically cleared. Recommendations and plan of care discussed with patient and with DAV Hernandez. RECOMMENDATIONS   Diet Recommendations - Solids:  (Restriction for clear liquids only)  Diet Recommendations - Liquids: Thin Liquids                           Aspiration Precautions: Upright position  Medication Administration Recommendations: No restrictions  Treatment Plan/Recommendations: SLP to reassess  Discharge Recommendations/Plan: Home    HISTORY   MEDICAL HISTORY  Reason for Referral: R/O aspiration    Problem List  Principal Problem:    Acute diverticulitis  Active Problems:    Type 2 diabetes mellitus without complication, without long-term current use of insulin ([address]    Hypercholesteremia    Primary hypertension    Gastroesophageal reflux disease      Past Medical History  Past Medical History:   Diagnosis Date    Diabetes ([address]     Essential hypertension        Prior Living Situation: Home with support  Diet Prior to Admission: Regular; Thin liquids  Precautions: Reflux    Patient/Family Goals: The patient is eager to return to solids. SWALLOWING HISTORY  Current Diet Consistency: Thin liquids (clear liquid diet)  Dysphagia History: The patient reports history of GERD, worsening when patient bends over to reach something off the floor. Imaging Results:     CT of abdomen, 07/21/23:    CONCLUSION:    1. There is extensive diverticulosis of sigmoid colon with mild stranding and thickening of inter fascial planes. Findings likely indicate mild acute diverticulitis. There is no free air or abscess. 2. Moderate diffuse retained fecal debris is noted throughout the colon consistent with constipation. 3. Lobular contour of both kidneys can be seen as a normal variant with fetal lobulation, however, multifocal scarring related to previous infection or infarct is suspected. LOCATION:  [facility]      Dictated by (CST): Monique Sever, MD on 7/21/2023 at 6:45 PM       Finalized by (CST): Monique Sever, MD on 7/21/2023 at 6:49 PM     SUBJECTIVE   The patient was seen in room, at bedside. OBJECTIVE   ORAL MOTOR EXAMINATION  Dentition: Upper dentures; Lower dentures  Symmetry: Within Functional Limits  Strength: Within Functional Limits  Tone: Within Functional Limits  Range of Motion: Within Functional Limits  Rate of Motion: Within Functional Limits    Voice Quality: Clear  Respiratory Status: Unlabored  Consistencies Trialed: Thin liquids  Method of Presentation: Self presentation;Cup;Straw  Patient Positioning: Upright;Midline    Oral Phase of Swallow: Within Functional Limits                      Pharyngeal Phase of Swallow: Within Functional Limits           (Please note: Silent aspiration cannot be evaluated clinically.  Videofluoroscopic Swallow Study is required to rule-out silent aspiration.)    Esophageal Phase of Swallow: No complaints consistent with possible esophageal involvement              GOALS  Goal #1 The patient will tolerate *a clear liquid diet, of thin liquids without overt signs or symptoms of aspiration with 100 % accuracy over 2 session(s).   In Progress                                        FOLLOW UP  Treatment Plan/Recommendations: SLP to reassess  Number of Visits to Meet Established Goals: 4  Follow Up Needed (Documentation Required): Yes  SLP Follow-up Date: 07/23/23    Thank you for your referral.   If you have any questions, please contact MERE Haque Weblinger Gürtel   Speech Language Pathologist  Office phone: [phone]  Pager: [phone], #[phone]

## 2023-07-22 NOTE — PROGRESS NOTES
NURSING ADMISSION NOTE      Patient admitted via Wheelchair  Oriented to room. Safety precautions initiated. Bed in low position. Call light in reach. New admission, pt from home, complaining of abdomen pain. Was just seen at the [facility] for same issue, was sent home with oral abx. Came here due to pain. CT abdomen shows diverticulitis. Pt NPO, did state that she has some trouble swallowing and feels that sometimes things get stuck in her throat. Per protocol ordered speech for pt. Pt A&Ox4 on room air, complaints of pain and nausea to abdomen. Gave prn pain medication as needed. IVF running, call light within reach, frequent checks made, needs met.

## 2023-07-22 NOTE — PROGRESS NOTES
ED Antibiotic Dose Adjustment by Pharmacy    piperacillin/tazobactam (ZOSYN) 3.375 g x1 dose has been ordered. Pharmacy has adjusted the dose to piperacillin/tazobactam (ZOSYN) 4.5 g x1 per hospital protocol.     Spencer Mckenna, PharmD  Clinical Pharmacist Specialist- Emergency Medicine  [facility]  [phone]

## 2023-07-22 NOTE — SLP NOTE
Orders received for bedside swallow evaluation, attempted to see patient. Spoke with RN Mary who requested SLP hold evaluation until cleared for po by MD secondary to abdominal pain. SLP will continue to follow and complete evaluation as appropriate.     Abraham Avila   Speech Language Pathologist  Office phone: [phone]  Pager: [phone], #[phone]

## 2023-07-22 NOTE — PLAN OF CARE
Pt received alert and oriented x4. C/o abdominal pain 8-9/10, prn dilaudid given with relief. Denies nausea. Tolerating clear liquid diet. VSS, afebrile. No BM this shift. IV abx. Fall precautions in place, call light within reach.

## 2023-07-22 NOTE — ED QUICK NOTES
Orders for admission, patient is aware of plan and ready to go upstairs. Any questions, please call ED RN Jean 91 at extension [phone].      Patient Covid vaccination status: Unvaccinated     COVID Test Ordered in ED: None    COVID Suspicion at Admission: N/A    Running Infusions:  None    Mental Status/LOC at time of transport: AOx4    Other pertinent information:   CIWA score: N/A   NIH score:  N/A

## 2023-07-23 LAB
ANION GAP SERPL CALC-SCNC: 3 MMOL/L (ref 0–18)
BUN BLD-MCNC: 7 MG/DL (ref 7–18)
CALCIUM BLD-MCNC: 8.9 MG/DL (ref 8.5–10.1)
CHLORIDE SERPL-SCNC: 112 MMOL/L (ref 98–112)
CO2 SERPL-SCNC: 26 MMOL/L (ref 21–32)
CREAT BLD-MCNC: 0.96 MG/DL
EGFRCR SERPLBLD CKD-EPI 2021: 68 ML/MIN/1.73M2 (ref 60–?)
GLUCOSE BLD-MCNC: 103 MG/DL (ref 70–99)
GLUCOSE BLD-MCNC: 110 MG/DL (ref 70–99)
GLUCOSE BLD-MCNC: 127 MG/DL (ref 70–99)
GLUCOSE BLD-MCNC: 97 MG/DL (ref 70–99)
OSMOLALITY SERPL CALC.SUM OF ELEC: 290 MOSM/KG (ref 275–295)
POTASSIUM SERPL-SCNC: 4 MMOL/L (ref 3.5–5.1)
SODIUM SERPL-SCNC: 141 MMOL/L (ref 136–145)

## 2023-07-23 PROCEDURE — 99232 SBSQ HOSP IP/OBS MODERATE 35: CPT | Performed by: HOSPITALIST

## 2023-07-23 RX ORDER — KETOROLAC TROMETHAMINE 30 MG/ML
15 INJECTION, SOLUTION INTRAMUSCULAR; INTRAVENOUS EVERY 6 HOURS PRN
Status: ACTIVE | OUTPATIENT
Start: 2023-07-23 | End: 2023-07-25

## 2023-07-23 RX ORDER — KETOROLAC TROMETHAMINE 30 MG/ML
30 INJECTION, SOLUTION INTRAMUSCULAR; INTRAVENOUS EVERY 6 HOURS PRN
Status: ACTIVE | OUTPATIENT
Start: 2023-07-23 | End: 2023-07-25

## 2023-07-23 RX ORDER — HYDROCODONE BITARTRATE AND ACETAMINOPHEN 10; 325 MG/1; MG/1
1 TABLET ORAL EVERY 4 HOURS PRN
Status: DISCONTINUED | OUTPATIENT
Start: 2023-07-23 | End: 2023-07-26

## 2023-07-23 RX ORDER — HYDROCODONE BITARTRATE AND ACETAMINOPHEN 5; 325 MG/1; MG/1
1-2 TABLET ORAL EVERY 4 HOURS PRN
Status: DISCONTINUED | OUTPATIENT
Start: 2023-07-23 | End: 2023-07-23

## 2023-07-23 RX ORDER — HYDROMORPHONE HYDROCHLORIDE 1 MG/ML
0.4 INJECTION, SOLUTION INTRAMUSCULAR; INTRAVENOUS; SUBCUTANEOUS EVERY 4 HOURS PRN
Status: DISCONTINUED | OUTPATIENT
Start: 2023-07-23 | End: 2023-07-26

## 2023-07-23 RX ORDER — HYDROMORPHONE HYDROCHLORIDE 1 MG/ML
0.2 INJECTION, SOLUTION INTRAMUSCULAR; INTRAVENOUS; SUBCUTANEOUS EVERY 4 HOURS PRN
Status: DISCONTINUED | OUTPATIENT
Start: 2023-07-23 | End: 2023-07-26

## 2023-07-23 NOTE — PLAN OF CARE
Pt received alert and oriented x4. C/o abdominal pain 8-9/10. Prn dilaudid given with relief. Tolerating full liquid diet. BM x1. IV abx. VSS, afebrile. Fall precautions in place. Call light within reach.        Problem: GASTROINTESTINAL - ADULT  Goal: Minimal or absence of nausea and vomiting  Description: INTERVENTIONS:  - Maintain adequate hydration with IV or PO as ordered and tolerated  - Nasogastric tube to low intermittent suction as ordered  - Evaluate effectiveness of ordered antiemetic medications  - Provide nonpharmacologic comfort measures as appropriate  - Advance diet as tolerated, if ordered  - Obtain nutritional consult as needed  - Evaluate fluid balance  Outcome: Progressing     Problem: GASTROINTESTINAL - ADULT  Goal: Maintains or returns to baseline bowel function  Description: INTERVENTIONS:  - Assess bowel function  - Maintain adequate hydration with IV or PO as ordered and tolerated  - Evaluate effectiveness of GI medications  - Encourage mobilization and activity  - Obtain nutritional consult as needed  - Establish a toileting routine/schedule  - Consider collaborating with pharmacy to review patient's medication profile  Outcome: Progressing     Problem: METABOLIC/FLUID AND ELECTROLYTES - ADULT  Goal: Glucose maintained within prescribed range  Description: INTERVENTIONS:  - Monitor Blood Glucose as ordered  - Assess for signs and symptoms of hyperglycemia and hypoglycemia  - Administer ordered medications to maintain glucose within target range  - Assess barriers to adequate nutritional intake and initiate nutrition consult as needed  - Instruct patient on self management of diabetes  Outcome: Progressing     Problem: PAIN - ADULT  Goal: Verbalizes/displays adequate comfort level or patient's stated pain goal  Description: INTERVENTIONS:  - Encourage pt to monitor pain and request assistance  - Assess pain using appropriate pain scale  - Administer analgesics based on type and severity of pain and evaluate response  - Implement non-pharmacological measures as appropriate and evaluate response  - Consider cultural and social influences on pain and pain management  - Manage/alleviate anxiety  - Utilize distraction and/or relaxation techniques  - Monitor for opioid side effects  - Notify MD/LIP if interventions unsuccessful or patient reports new pain  - Anticipate increased pain with activity and pre-medicate as appropriate  Outcome: Progressing

## 2023-07-23 NOTE — PLAN OF CARE
Patient alert and oriented x4. VSS. Afebrile. C/o abd pain. PRN dilaudid administered. No c/o nausea/emesis at this time. Medications administered per MAR. IVF infusing. No coverage required for BG. Safety precautions continued. Call light within reach.   Problem: GASTROINTESTINAL - ADULT  Goal: Minimal or absence of nausea and vomiting  Description: INTERVENTIONS:  - Maintain adequate hydration with IV or PO as ordered and tolerated  - Nasogastric tube to low intermittent suction as ordered  - Evaluate effectiveness of ordered antiemetic medications  - Provide nonpharmacologic comfort measures as appropriate  - Advance diet as tolerated, if ordered  - Obtain nutritional consult as needed  - Evaluate fluid balance  Outcome: Progressing     Problem: METABOLIC/FLUID AND ELECTROLYTES - ADULT  Goal: Glucose maintained within prescribed range  Description: INTERVENTIONS:  - Monitor Blood Glucose as ordered  - Assess for signs and symptoms of hyperglycemia and hypoglycemia  - Administer ordered medications to maintain glucose within target range  - Assess barriers to adequate nutritional intake and initiate nutrition consult as needed  - Instruct patient on self management of diabetes  Outcome: Progressing     Problem: GASTROINTESTINAL - ADULT  Goal: Maintains or returns to baseline bowel function  Description: INTERVENTIONS:  - Assess bowel function  - Maintain adequate hydration with IV or PO as ordered and tolerated  - Evaluate effectiveness of GI medications  - Encourage mobilization and activity  - Obtain nutritional consult as needed  - Establish a toileting routine/schedule  - Consider collaborating with pharmacy to review patient's medication profile  Outcome: Not Progressing     Problem: PAIN - ADULT  Goal: Verbalizes/displays adequate comfort level or patient's stated pain goal  Description: INTERVENTIONS:  - Encourage pt to monitor pain and request assistance  - Assess pain using appropriate pain scale  - Administer analgesics based on type and severity of pain and evaluate response  - Implement non-pharmacological measures as appropriate and evaluate response  - Consider cultural and social influences on pain and pain management  - Manage/alleviate anxiety  - Utilize distraction and/or relaxation techniques  - Monitor for opioid side effects  - Notify MD/LIP if interventions unsuccessful or patient reports new pain  - Anticipate increased pain with activity and pre-medicate as appropriate  Outcome: Not Progressing

## 2023-07-24 LAB
GLUCOSE BLD-MCNC: 103 MG/DL (ref 70–99)
GLUCOSE BLD-MCNC: 118 MG/DL (ref 70–99)
GLUCOSE BLD-MCNC: 124 MG/DL (ref 70–99)
GLUCOSE BLD-MCNC: 133 MG/DL (ref 70–99)

## 2023-07-24 PROCEDURE — 99232 SBSQ HOSP IP/OBS MODERATE 35: CPT | Performed by: HOSPITALIST

## 2023-07-24 NOTE — PROGRESS NOTES
Alert and orientated x4. Requesting something for pain. Concerns about weaning down pain medications. Norco increased to 10/325. Took two doses with good relief. Afebrile. Ambulating with steady gait. Vital signs stable. Afebrile.

## 2023-07-25 LAB
ANION GAP SERPL CALC-SCNC: 4 MMOL/L (ref 0–18)
BASOPHILS # BLD AUTO: 0.02 X10(3) UL (ref 0–0.2)
BASOPHILS NFR BLD AUTO: 0.3 %
BUN BLD-MCNC: 4 MG/DL (ref 7–18)
CALCIUM BLD-MCNC: 9.2 MG/DL (ref 8.5–10.1)
CHLORIDE SERPL-SCNC: 114 MMOL/L (ref 98–112)
CO2 SERPL-SCNC: 23 MMOL/L (ref 21–32)
CREAT BLD-MCNC: 0.8 MG/DL
EGFRCR SERPLBLD CKD-EPI 2021: 84 ML/MIN/1.73M2 (ref 60–?)
EOSINOPHIL # BLD AUTO: 0.18 X10(3) UL (ref 0–0.7)
EOSINOPHIL NFR BLD AUTO: 2.6 %
ERYTHROCYTE [DISTWIDTH] IN BLOOD BY AUTOMATED COUNT: 13.1 %
GLUCOSE BLD-MCNC: 111 MG/DL (ref 70–99)
GLUCOSE BLD-MCNC: 125 MG/DL (ref 70–99)
GLUCOSE BLD-MCNC: 138 MG/DL (ref 70–99)
GLUCOSE BLD-MCNC: 142 MG/DL (ref 70–99)
GLUCOSE BLD-MCNC: 99 MG/DL (ref 70–99)
HCT VFR BLD AUTO: 34.5 %
HGB BLD-MCNC: 10.9 G/DL
IMM GRANULOCYTES # BLD AUTO: 0.02 X10(3) UL (ref 0–1)
IMM GRANULOCYTES NFR BLD: 0.3 %
LYMPHOCYTES # BLD AUTO: 2.02 X10(3) UL (ref 1–4)
LYMPHOCYTES NFR BLD AUTO: 29.1 %
MCH RBC QN AUTO: 27.3 PG (ref 26–34)
MCHC RBC AUTO-ENTMCNC: 31.6 G/DL (ref 31–37)
MCV RBC AUTO: 86.3 FL
MONOCYTES # BLD AUTO: 0.5 X10(3) UL (ref 0.1–1)
MONOCYTES NFR BLD AUTO: 7.2 %
NEUTROPHILS # BLD AUTO: 4.21 X10 (3) UL (ref 1.5–7.7)
NEUTROPHILS # BLD AUTO: 4.21 X10(3) UL (ref 1.5–7.7)
NEUTROPHILS NFR BLD AUTO: 60.5 %
OSMOLALITY SERPL CALC.SUM OF ELEC: 289 MOSM/KG (ref 275–295)
PLATELET # BLD AUTO: 277 10(3)UL (ref 150–450)
POTASSIUM SERPL-SCNC: 4.1 MMOL/L (ref 3.5–5.1)
RBC # BLD AUTO: 4 X10(6)UL
SODIUM SERPL-SCNC: 141 MMOL/L (ref 136–145)
WBC # BLD AUTO: 7 X10(3) UL (ref 4–11)

## 2023-07-25 PROCEDURE — 99232 SBSQ HOSP IP/OBS MODERATE 35: CPT | Performed by: HOSPITALIST

## 2023-07-25 NOTE — PLAN OF CARE
PT A/O, 98% ON RA, AFEBRILE, UP VOIDING IN BATHROOM, IV ANTIBIOTICS, IVF INFUSING, CLEAR LIQUIDS, TAKING NORCO AND DILAUDID FOR PAIN, LABS IN AM    Problem: PAIN - ADULT  Goal: Verbalizes/displays adequate comfort level or patient's stated pain goal  Description: INTERVENTIONS:  - Encourage pt to monitor pain and request assistance  - Assess pain using appropriate pain scale  - Administer analgesics based on type and severity of pain and evaluate response  - Implement non-pharmacological measures as appropriate and evaluate response  - Consider cultural and social influences on pain and pain management  - Manage/alleviate anxiety  - Utilize distraction and/or relaxation techniques  - Monitor for opioid side effects  - Notify MD/LIP if interventions unsuccessful or patient reports new pain  - Anticipate increased pain with activity and pre-medicate as appropriate  Outcome: Progressing

## 2023-07-25 NOTE — PLAN OF CARE
Pt. A&O x4. VSS. Afebrile. Pt. C/o abdominal pain; PRN pain medication given per MAR. Pt. Downgraded to clear liquid diet. Pt. Ambulating independently. IV antibiotics continued. Call light within reach. Will continue to monitor.     Problem: GASTROINTESTINAL - ADULT  Goal: Minimal or absence of nausea and vomiting  Description: INTERVENTIONS:  - Maintain adequate hydration with IV or PO as ordered and tolerated  - Nasogastric tube to low intermittent suction as ordered  - Evaluate effectiveness of ordered antiemetic medications  - Provide nonpharmacologic comfort measures as appropriate  - Advance diet as tolerated, if ordered  - Obtain nutritional consult as needed  - Evaluate fluid balance  Outcome: Progressing     Problem: PAIN - ADULT  Goal: Verbalizes/displays adequate comfort level or patient's stated pain goal  Description: INTERVENTIONS:  - Encourage pt to monitor pain and request assistance  - Assess pain using appropriate pain scale  - Administer analgesics based on type and severity of pain and evaluate response  - Implement non-pharmacological measures as appropriate and evaluate response  - Consider cultural and social influences on pain and pain management  - Manage/alleviate anxiety  - Utilize distraction and/or relaxation techniques  - Monitor for opioid side effects  - Notify MD/LIP if interventions unsuccessful or patient reports new pain  - Anticipate increased pain with activity and pre-medicate as appropriate  Outcome: Progressing

## 2023-07-26 VITALS
WEIGHT: 183.88 LBS | RESPIRATION RATE: 18 BRPM | SYSTOLIC BLOOD PRESSURE: 102 MMHG | HEART RATE: 65 BPM | TEMPERATURE: 97 F | BODY MASS INDEX: 28.86 KG/M2 | HEIGHT: 67 IN | OXYGEN SATURATION: 98 % | DIASTOLIC BLOOD PRESSURE: 67 MMHG

## 2023-07-26 LAB
GLUCOSE BLD-MCNC: 102 MG/DL (ref 70–99)
GLUCOSE BLD-MCNC: 105 MG/DL (ref 70–99)
GLUCOSE BLD-MCNC: 97 MG/DL (ref 70–99)

## 2023-07-26 PROCEDURE — 99239 HOSP IP/OBS DSCHRG MGMT >30: CPT | Performed by: INTERNAL MEDICINE

## 2023-07-26 RX ORDER — HYDROCODONE BITARTRATE AND ACETAMINOPHEN 10; 325 MG/1; MG/1
1 TABLET ORAL EVERY 4 HOURS PRN
Qty: 15 TABLET | Refills: 0 | Status: SHIPPED | OUTPATIENT
Start: 2023-07-26 | End: 2023-07-31

## 2023-07-26 NOTE — PLAN OF CARE
Patient is alert and orientated x4, VSS, RA, afberile and has 8/10 pain to right side of abdomen, relieved by Norco. Patient was advanced to soft diet and tolerated well. All meds given per [facility] including IV abx. Plan for discharge as long as diet is tolerated. Call light and safety precautions are in place.      Problem: Patient/Family Goals  Goal: Patient/Family Long Term Goal  Description: Patient's Long Term Goal:    Interventions:    - See additional Care Plan goals for specific interventions  Outcome: Progressing  Goal: Patient/Family Short Term Goal  Description: Patient's Short Term Goal:    Interventions:   - See additional Care Plan goals for specific interventions  Outcome: Progressing     Problem: GASTROINTESTINAL - ADULT  Goal: Minimal or absence of nausea and vomiting  Description: INTERVENTIONS:  - Maintain adequate hydration with IV or PO as ordered and tolerated  - Nasogastric tube to low intermittent suction as ordered  - Evaluate effectiveness of ordered antiemetic medications  - Provide nonpharmacologic comfort measures as appropriate  - Advance diet as tolerated, if ordered  - Obtain nutritional consult as needed  - Evaluate fluid balance  Outcome: Progressing  Goal: Maintains or returns to baseline bowel function  Description: INTERVENTIONS:  - Assess bowel function  - Maintain adequate hydration with IV or PO as ordered and tolerated  - Evaluate effectiveness of GI medications  - Encourage mobilization and activity  - Obtain nutritional consult as needed  - Establish a toileting routine/schedule  - Consider collaborating with pharmacy to review patient's medication profile  Outcome: Progressing     Problem: METABOLIC/FLUID AND ELECTROLYTES - ADULT  Goal: Glucose maintained within prescribed range  Description: INTERVENTIONS:  - Monitor Blood Glucose as ordered  - Assess for signs and symptoms of hyperglycemia and hypoglycemia  - Administer ordered medications to maintain glucose within target range  - Assess barriers to adequate nutritional intake and initiate nutrition consult as needed  - Instruct patient on self management of diabetes  Outcome: Progressing     Problem: PAIN - ADULT  Goal: Verbalizes/displays adequate comfort level or patient's stated pain goal  Description: INTERVENTIONS:  - Encourage pt to monitor pain and request assistance  - Assess pain using appropriate pain scale  - Administer analgesics based on type and severity of pain and evaluate response  - Implement non-pharmacological measures as appropriate and evaluate response  - Consider cultural and social influences on pain and pain management  - Manage/alleviate anxiety  - Utilize distraction and/or relaxation techniques  - Monitor for opioid side effects  - Notify MD/LIP if interventions unsuccessful or patient reports new pain  - Anticipate increased pain with activity and pre-medicate as appropriate  Outcome: Progressing

## 2023-07-26 NOTE — PLAN OF CARE
Received pt a/o x4. VSS. Afebrile. Pt c/o 6/10 abd pain, PRN norco given & heat packs applied. IVF & IV abx infusing per MAR. Tolerating diet. Up to bathroom, voiding. No BM this shift. Call light within reach. Safety precautions in place.      Problem: GASTROINTESTINAL - ADULT  Goal: Maintains or returns to baseline bowel function  Description: INTERVENTIONS:  - Assess bowel function  - Maintain adequate hydration with IV or PO as ordered and tolerated  - Evaluate effectiveness of GI medications  - Encourage mobilization and activity  - Obtain nutritional consult as needed  - Establish a toileting routine/schedule  - Consider collaborating with pharmacy to review patient's medication profile  Outcome: Progressing     Problem: PAIN - ADULT  Goal: Verbalizes/displays adequate comfort level or patient's stated pain goal  Description: INTERVENTIONS:  - Encourage pt to monitor pain and request assistance  - Assess pain using appropriate pain scale  - Administer analgesics based on type and severity of pain and evaluate response  - Implement non-pharmacological measures as appropriate and evaluate response  - Consider cultural and social influences on pain and pain management  - Manage/alleviate anxiety  - Utilize distraction and/or relaxation techniques  - Monitor for opioid side effects  - Notify MD/LIP if interventions unsuccessful or patient reports new pain  - Anticipate increased pain with activity and pre-medicate as appropriate  Outcome: Progressing

## 2023-07-27 ENCOUNTER — PATIENT OUTREACH (OUTPATIENT)
Dept: CASE MANAGEMENT | Age: 58
End: 2023-07-27

## 2023-07-27 DIAGNOSIS — Z02.9 ENCOUNTERS FOR ADMINISTRATIVE PURPOSE: Primary | ICD-10-CM

## 2023-07-27 NOTE — PROGRESS NOTES
NURSING DISCHARGE NOTE    Discharged Home via Wheelchair. Accompanied by Support staff  Belongings Taken by patient/family. All belongings and discharge papers were taken by patient. All questions were answered. Patient [facility] taken downstairs via wheelchair [facility]

## 2023-07-27 NOTE — PROGRESS NOTES
STEPHAN s/w patient who states that she is currently still in bed sleeping. She did need to reschedule her TCC appt for Monday as her daughter is unable to take her tomorrow. STEPHAN rescheduled appt and pt agreeable to a call back later so she can go back to sleep. STEPHAN will call again later.

## 2023-07-27 NOTE — PAYOR COMM NOTE
--------------  DISCHARGE REVIEW    Payor: Akbar Lan #:  [id number]  Authorization Number: [id number]    Admit date: 7/21/23  Admit time:   9:47 PM  Discharge Date: 7/26/2023  6:22 PM     Admitting Physician: aMry Acevedo MD  Attending Physician:  No att. providers found  Primary Care Physician: Romana Sings, DO       Discharge Summary Notes    No notes of this type exist for this encounter.

## 2023-07-28 DIAGNOSIS — K57.92 DIVERTICULITIS: Primary | ICD-10-CM

## 2023-07-31 ENCOUNTER — APPOINTMENT (OUTPATIENT)
Dept: PHYSICAL THERAPY | Facility: HOSPITAL | Age: 58
End: 2023-07-31
Attending: PHYSICIAN ASSISTANT
Payer: MEDICAID

## 2023-07-31 ENCOUNTER — OFFICE VISIT (OUTPATIENT)
Dept: INTERNAL MEDICINE CLINIC | Facility: CLINIC | Age: 58
End: 2023-07-31
Payer: MEDICAID

## 2023-07-31 VITALS
TEMPERATURE: 97 F | WEIGHT: 178 LBS | SYSTOLIC BLOOD PRESSURE: 115 MMHG | OXYGEN SATURATION: 97 % | RESPIRATION RATE: 18 BRPM | HEIGHT: 67 IN | DIASTOLIC BLOOD PRESSURE: 77 MMHG | BODY MASS INDEX: 27.94 KG/M2 | HEART RATE: 100 BPM

## 2023-07-31 DIAGNOSIS — M17.0 PRIMARY OSTEOARTHRITIS OF BOTH KNEES: ICD-10-CM

## 2023-07-31 DIAGNOSIS — E78.00 HYPERCHOLESTEREMIA: ICD-10-CM

## 2023-07-31 DIAGNOSIS — K57.92 ACUTE DIVERTICULITIS: Primary | ICD-10-CM

## 2023-07-31 DIAGNOSIS — K21.9 GASTROESOPHAGEAL REFLUX DISEASE, UNSPECIFIED WHETHER ESOPHAGITIS PRESENT: ICD-10-CM

## 2023-07-31 DIAGNOSIS — I10 PRIMARY HYPERTENSION: ICD-10-CM

## 2023-07-31 DIAGNOSIS — E11.9 TYPE 2 DIABETES MELLITUS WITHOUT COMPLICATION, WITHOUT LONG-TERM CURRENT USE OF INSULIN (HCC): ICD-10-CM

## 2023-07-31 RX ORDER — CIPROFLOXACIN 500 MG/1
500 TABLET, FILM COATED ORAL EVERY 12 HOURS
COMMUNITY
Start: 2023-07-19

## 2023-07-31 RX ORDER — FLUTICASONE PROPIONATE 50 MCG
1 SPRAY, SUSPENSION (ML) NASAL AS NEEDED
COMMUNITY

## 2023-07-31 RX ORDER — HYDROCODONE BITARTRATE AND ACETAMINOPHEN 10; 325 MG/1; MG/1
1 TABLET ORAL EVERY 8 HOURS PRN
Qty: 15 TABLET | Refills: 0 | Status: SHIPPED | OUTPATIENT
Start: 2023-07-31

## 2023-07-31 RX ORDER — DOCUSATE SODIUM 100 MG/1
100 CAPSULE, LIQUID FILLED ORAL 3 TIMES DAILY PRN
COMMUNITY
Start: 2023-07-19

## 2023-07-31 RX ORDER — CETIRIZINE HYDROCHLORIDE 10 MG/1
10 TABLET ORAL AS NEEDED
COMMUNITY

## 2023-07-31 RX ORDER — METRONIDAZOLE 500 MG/1
500 TABLET ORAL 3 TIMES DAILY
COMMUNITY
Start: 2023-07-19

## 2023-07-31 NOTE — PATIENT INSTRUCTIONS
- continue low fiber, soft diet  - go to surgery and primary care appointments as scheduled  - continue to reduce norco use as your pain improves as this can worsen constipation  - continue stool softener  - try using applesauce if difficulty with pills  - take pantoprazole on an empty stomach 30 min before your meal  - continue physical therapy  - call if you developed fever, worsening abdominal pain, vomiting, or diarrhea

## 2023-07-31 NOTE — PROGRESS NOTES
TRANSITIONAL CARE CLINIC PHARMACIST MEDICATION RECONCILIATION        Elizabeth Erickson MRN [id number]    1965 PCP Mariaelena Valle DO       Comments: Medication history completed by [address] [place] [address] Student Pharmacist with patient in the office. The following medication changes occurred while patient was hospitalized:  Medications Started:   Hydrocodone/Acetaminophen 10-325mg tabs: 1 tablet every 4 hours as needed   Medications Stopped:   Gabapentin 100mg caps   Hydrocodone/Acetaminophen 5-325mg tabs       Outpatient Encounter Medications as of 2023   Medication Sig    ciprofloxacin 500 MG Oral Tab Take 1 tablet (500 mg total) by mouth every 12 (twelve) hours. For 10 days    docusate sodium 100 MG Oral Cap Take 1 capsule (100 mg total) by mouth 3 (three) times daily as needed for constipation. metRONIDAZOLE 500 MG Oral Tab Take 1 tablet (500 mg total) by mouth 3 (three) times daily. cetirizine 10 MG Oral Tab Take 1 tablet (10 mg total) by mouth as needed for Allergies. fluticasone propionate 50 MCG/ACT Nasal Suspension 1 spray by Each Nare route as needed. HYDROcodone-acetaminophen  MG Oral Tab Take 1 tablet by mouth every 4 (four) hours as needed. metFORMIN  MG Oral Tablet 24 Hr Take 1 tablet (500 mg total) by mouth daily with dinner. albuterol 108 (90 Base) MCG/ACT Inhalation Aero Soln Inhale 2 puffs into the lungs 4 (four) times daily as needed. atorvastatin 40 MG Oral Tab Take 1 tablet (40 mg total) by mouth daily. pantoprazole 40 MG Oral Tab EC Take 1 tablet (40 mg total) by mouth daily. Triamterene-HCTZ 37.5-25 MG Oral Tab Take 1 tablet by mouth daily. Zolpidem Tartrate ER 12.5 MG Oral Tab CR Take 1 tablet (12.5 mg total) by mouth daily. Vitamin D, Ergocalciferol, 63187 units Oral Cap Take 50,000 Units by mouth once a week for 12 doses.  (Patient not taking: Reported on 2023)       Medication Adherence Assessment:   Patient reports taking medications as prescribed. States she has sharp pain in her abdomen for which she is taking hydrocodone/acetaminophen. Takes hydrocodone/acetaminophen a few times a day. Reports her pain is a 10/10 before medication and a 7/10 after. Discussed with PA and pended order for hydrocodone/acetaminophen. Patient is experiencing constipation and is taking docusate which is helping. Patient reports burning in her esophagus when she bends over. Patient is wondering is she needs to increase her pantoprazole dose. Educated patient to take pantoprazole on an empty stomach 30 minutes before breakfast.  PA to assess further. Patient mentioned having trouble swallowing pills especially her metformin. Counseled patient to try taking medications with applesauce. Reviewed all medications in detail with patient including dose, indication, timing of administration, monitoring parameters, and potential side effects of medications. Patient confirmed understanding.      Thank you,  Mickie Watson, PharmD Candidate 2024     Minus Leonides Linares, 7/31/2023, 12:31 PM  [facility]

## 2023-08-07 DIAGNOSIS — G47.00 INSOMNIA, UNSPECIFIED TYPE: ICD-10-CM

## 2023-08-07 DIAGNOSIS — I10 PRIMARY HYPERTENSION: ICD-10-CM

## 2023-08-07 NOTE — TELEPHONE ENCOUNTER
Last OV: 6/26/23    Future Appointments   Date Time Provider Gabe Middleton   8/9/2023 11:20 AM Key Valle Do, DO [facility] [address] [facility]   8/9/2023  2:15 PM Stacie Brennan, PT [address] [facility] AT [facility]   8/11/2023 10:30 AM Marley Deluca, PT [address] [facility] AT [facility]   8/14/2023  2:15 PM Stacie Brennan, PT 1404 [facility] AT [facility] AT [facility]   8/16/2023 10:15 AM Xavi Schaefer MD [facility]   8/16/2023  2:15 PM Stacie Brennan, PT [address] [facility] [address]   8/21/2023  2:15 PM [facility] Brennan, PT [facility] [facility]        Latest labs: 7/25/23 BMP and CBC    Latest RX: ambien- 30 tabs 0 refills on 6/5/23    Per protocol, maxzide passed. Rx sent. Per protocol, ambien not on protocol. Rx pending.

## 2023-08-07 NOTE — TELEPHONE ENCOUNTER
Pt is requesting for this med refill to be sent to Vikram Mix, [address] AT [facility] [address], [phone], [phone] [facility] [address] Phone: [phone] Fax: [phone]     She is completely out     Disp Refills Start End   TRIAMTERENE-HCTZ 37.5-25 MG Oral Tab [Pharmacy Med Name: Triamterene-HCTZ Oral Tablet 37.5-25 MG] 30 tablet 0 8/7/2023    Sig - Route: Take 1 tablet by mouth daily. - Oral   KRISTOFER: No     ZOLPIDEM TARTRATE ER 12.5 MG Oral Tab CR [Pharmacy Med Name: Zolpidem Tartrate ER Oral Tablet Extended Release 12.5 MG] 30 tablet 0 8/7/2023    Sig - Route: TAKE 1 TABLET BY MOUTH EVERY DAY - Oral   KRISTOFER: No     Medication Quantity Refills Start End   pantoprazole 40 MG Oral Tab EC 90 tablet 0 6/5/2023    Sig:   Take 1 tablet (40 mg total) by mouth daily.      Route:   Oral

## 2023-08-07 NOTE — TELEPHONE ENCOUNTER
Requested Prescriptions     Pending Prescriptions Disp Refills    TRIAMTERENE-HCTZ 37.5-25 MG Oral Tab [Pharmacy Med Name: Triamterene-HCTZ Oral Tablet 37.5-25 MG] 30 tablet 0     Sig: Take 1 tablet by mouth daily.     ZOLPIDEM TARTRATE ER 12.5 MG Oral Tab CR [Pharmacy Med Name: Zolpidem Tartrate ER Oral Tablet Extended Release 12.5 MG] 30 tablet 0     Sig: TAKE 1 TABLET BY MOUTH EVERY DAY       LOV:  6--acute visit-mp    LAST CPE: overdue    Last Labs: 7--cbc,bmp    Last Refill: 6-5-2023- 30 tabs with 0 refills     Your appointments       Date & Time Appointment Department [facility])    Aug 09, 2023 11:20 AM CDT Hospital Follow Up with Matthew Valle, DO [address] ([facility] IM/[facility] [place])        Aug 09, 2023  2:15 PM CDT Physical Therapy Ortho Evaluation with Syed Sheets, PT [facility] Physical Therapy [facility])        Aug 11, 2023 10:30 AM CDT Physical Therapy Ortho Treatment with Justina Saini, PT [facility] Physical Therapy [facility])        Aug 14, 2023  2:15 PM CDT Physical Therapy Ortho Treatment with Syed Sheets, PT [facility] Physical Therapy [facility])        Aug 16, 2023 10:15 AM CDT Exam - Established [facility], Elsy Leon ([address] [place] [address])        Aug 16, 2023  2:15 PM CDT Physical Therapy Ortho Treatment with Syed Sheets, 1 [facility] Physical Therapy [facility])        Aug 21, 2023  2:15 PM CDT Physical Therapy Ortho Treatment with Syed Sheets, 1 [facility] Physical Therapy ([facility])              [address]  [address] [facility]  EMG [facility] IM/ [place]  [address] [place],[address]  [phone]

## 2023-08-08 DIAGNOSIS — I10 PRIMARY HYPERTENSION: ICD-10-CM

## 2023-08-08 RX ORDER — ZOLPIDEM TARTRATE 12.5 MG/1
12.5 TABLET, FILM COATED, EXTENDED RELEASE ORAL DAILY
Qty: 30 TABLET | Refills: 0 | Status: SHIPPED | OUTPATIENT
Start: 2023-08-08

## 2023-08-08 RX ORDER — TRIAMTERENE AND HYDROCHLOROTHIAZIDE 37.5; 25 MG/1; MG/1
1 TABLET ORAL DAILY
Qty: 90 TABLET | Refills: 0 | Status: SHIPPED | OUTPATIENT
Start: 2023-08-08 | End: 2023-08-09

## 2023-08-09 ENCOUNTER — OFFICE VISIT (OUTPATIENT)
Dept: PHYSICAL THERAPY | Facility: HOSPITAL | Age: 58
End: 2023-08-09
Attending: PHYSICIAN ASSISTANT
Payer: MEDICAID

## 2023-08-09 ENCOUNTER — OFFICE VISIT (OUTPATIENT)
Dept: INTERNAL MEDICINE CLINIC | Facility: CLINIC | Age: 58
End: 2023-08-09
Payer: MEDICAID

## 2023-08-09 VITALS
DIASTOLIC BLOOD PRESSURE: 74 MMHG | WEIGHT: 183 LBS | HEIGHT: 67 IN | RESPIRATION RATE: 16 BRPM | HEART RATE: 78 BPM | OXYGEN SATURATION: 100 % | SYSTOLIC BLOOD PRESSURE: 118 MMHG | BODY MASS INDEX: 28.72 KG/M2 | TEMPERATURE: 98 F

## 2023-08-09 DIAGNOSIS — M25.561 CHRONIC PAIN OF RIGHT KNEE: ICD-10-CM

## 2023-08-09 DIAGNOSIS — E55.9 VITAMIN D DEFICIENCY: ICD-10-CM

## 2023-08-09 DIAGNOSIS — K21.9 GASTROESOPHAGEAL REFLUX DISEASE, UNSPECIFIED WHETHER ESOPHAGITIS PRESENT: ICD-10-CM

## 2023-08-09 DIAGNOSIS — I10 PRIMARY HYPERTENSION: ICD-10-CM

## 2023-08-09 DIAGNOSIS — G89.29 CHRONIC PAIN OF RIGHT KNEE: ICD-10-CM

## 2023-08-09 DIAGNOSIS — K57.92 ACUTE DIVERTICULITIS: Primary | ICD-10-CM

## 2023-08-09 DIAGNOSIS — M67.449 GANGLION CYST OF FINGER: ICD-10-CM

## 2023-08-09 DIAGNOSIS — E78.00 HYPERCHOLESTEREMIA: ICD-10-CM

## 2023-08-09 DIAGNOSIS — E11.9 TYPE 2 DIABETES MELLITUS WITHOUT COMPLICATION, WITHOUT LONG-TERM CURRENT USE OF INSULIN (HCC): ICD-10-CM

## 2023-08-09 PROCEDURE — 3008F BODY MASS INDEX DOCD: CPT | Performed by: INTERNAL MEDICINE

## 2023-08-09 PROCEDURE — 97162 PT EVAL MOD COMPLEX 30 MIN: CPT

## 2023-08-09 PROCEDURE — 3078F DIAST BP <80 MM HG: CPT | Performed by: INTERNAL MEDICINE

## 2023-08-09 PROCEDURE — 97110 THERAPEUTIC EXERCISES: CPT

## 2023-08-09 PROCEDURE — 3074F SYST BP LT 130 MM HG: CPT | Performed by: INTERNAL MEDICINE

## 2023-08-09 PROCEDURE — 99214 OFFICE O/P EST MOD 30 MIN: CPT | Performed by: INTERNAL MEDICINE

## 2023-08-09 RX ORDER — ERGOCALCIFEROL 1.25 MG/1
50000 CAPSULE ORAL WEEKLY
Qty: 12 CAPSULE | Refills: 0 | Status: SHIPPED | OUTPATIENT
Start: 2023-08-09

## 2023-08-09 RX ORDER — TRIAMTERENE AND HYDROCHLOROTHIAZIDE 37.5; 25 MG/1; MG/1
1 TABLET ORAL DAILY
Qty: 30 TABLET | Refills: 0 | Status: SHIPPED | OUTPATIENT
Start: 2023-08-09 | End: 2023-08-09

## 2023-08-09 RX ORDER — TRIAMTERENE AND HYDROCHLOROTHIAZIDE 37.5; 25 MG/1; MG/1
1 TABLET ORAL DAILY
Qty: 30 TABLET | Refills: 0 | Status: SHIPPED | OUTPATIENT
Start: 2023-08-09

## 2023-08-09 RX ORDER — PANTOPRAZOLE SODIUM 40 MG/1
40 TABLET, DELAYED RELEASE ORAL DAILY
Qty: 90 TABLET | Refills: 0 | Status: SHIPPED | OUTPATIENT
Start: 2023-08-09

## 2023-08-09 RX ORDER — HYDROCODONE BITARTRATE AND ACETAMINOPHEN 10; 325 MG/1; MG/1
1 TABLET ORAL EVERY 8 HOURS PRN
Qty: 15 TABLET | Refills: 0 | Status: SHIPPED | OUTPATIENT
Start: 2023-08-09

## 2023-08-09 RX ORDER — ATORVASTATIN CALCIUM 40 MG/1
40 TABLET, FILM COATED ORAL DAILY
Qty: 90 TABLET | Refills: 0 | Status: SHIPPED | OUTPATIENT
Start: 2023-08-09

## 2023-08-09 NOTE — TELEPHONE ENCOUNTER
Upcoming appt 8/9/23    Hypertension Medications Protocol [id number]/08/2023 10:08 AM   Protocol Details CMP or BMP in past 12 months    Last serum creatinine< 2.0    Appointment in past 6 or next 3 months

## 2023-08-10 DIAGNOSIS — K21.9 GASTROESOPHAGEAL REFLUX DISEASE, UNSPECIFIED WHETHER ESOPHAGITIS PRESENT: ICD-10-CM

## 2023-08-10 DIAGNOSIS — I10 PRIMARY HYPERTENSION: ICD-10-CM

## 2023-08-10 NOTE — TELEPHONE ENCOUNTER
[facility] pharmacy:[id number] - [address] AT [facility] [address] [facility], [phone], [phone] [facility]  [place] [address] Phone: [phone] Fax: [phone]     Prescription Refill Request - Patient advised can take 48-72 hours. Name of Medication (strength, dose, qty requested:     Pt stated she didn't received the following:     pantoprazole 40 MG Oral Tab EC 90 tablet 0 8/9/2023     Sig - Route: Take 1 tablet (40 mg total) by mouth daily. - Oral      Medication Quantity Refills Start End   Triamterene-HCTZ 37.5-25 MG Oral Tab 30 tablet 0 8/9/2023    Sig:   Take 1 tablet by mouth daily. Route:   Oral       Pharmacy told her they don't have the scripts and she is also missing her flonase. She did indicate the Triamterene is what she needs most she said we sent it to [facility] she doesn't want any of her meds to go to [facility].

## 2023-08-11 ENCOUNTER — OFFICE VISIT (OUTPATIENT)
Dept: PHYSICAL THERAPY | Facility: HOSPITAL | Age: 58
End: 2023-08-11
Attending: PHYSICIAN ASSISTANT
Payer: MEDICAID

## 2023-08-11 DIAGNOSIS — G89.29 CHRONIC KNEE PAIN AFTER TOTAL REPLACEMENT OF RIGHT KNEE JOINT: Primary | ICD-10-CM

## 2023-08-11 DIAGNOSIS — M25.561 CHRONIC KNEE PAIN AFTER TOTAL REPLACEMENT OF RIGHT KNEE JOINT: Primary | ICD-10-CM

## 2023-08-11 DIAGNOSIS — Z96.651 CHRONIC KNEE PAIN AFTER TOTAL REPLACEMENT OF RIGHT KNEE JOINT: Primary | ICD-10-CM

## 2023-08-11 PROCEDURE — 97140 MANUAL THERAPY 1/> REGIONS: CPT | Performed by: PHYSICAL THERAPIST

## 2023-08-11 PROCEDURE — 97110 THERAPEUTIC EXERCISES: CPT | Performed by: PHYSICAL THERAPIST

## 2023-08-11 RX ORDER — TRIAMTERENE AND HYDROCHLOROTHIAZIDE 37.5; 25 MG/1; MG/1
1 TABLET ORAL DAILY
Qty: 30 TABLET | Refills: 0 | OUTPATIENT
Start: 2023-08-11

## 2023-08-11 RX ORDER — PANTOPRAZOLE SODIUM 40 MG/1
40 TABLET, DELAYED RELEASE ORAL DAILY
Qty: 90 TABLET | Refills: 0 | OUTPATIENT
Start: 2023-08-11

## 2023-08-11 RX ORDER — FLUTICASONE PROPIONATE 50 MCG
1 SPRAY, SUSPENSION (ML) NASAL AS NEEDED
Qty: 1 EACH | Refills: 1 | Status: SHIPPED | OUTPATIENT
Start: 2023-08-11

## 2023-08-11 NOTE — PROGRESS NOTES
Diagnosis:    Chronic knee pain after total replacement of right knee joint (M25.561,G89.29,Z96. Referring Provider: Logan Prieto  Date of Evaluation:    8/9/23    Precautions:  None Next MD visit:   none scheduled  Date of Surgery: n/a   Insurance Primary/Secondary: BLUE CROSS MEDICAID / N/A     # Auth Visits: 9            Subjective: knee feels good. Just feels weak . My kids keep telling me to  my leg when I am walking. Pain: 5/10      Objective: seen for first treatment. Poor isolation of quads when completing QS in supine . Unable to complete a full step down with emphasis on ecc control right le. Assessment: focus on quad, ecc and functional control of right LE. Has a loss of terminal extension,extensor lag  and will continue to strongly benefit from PT with emphasis on CKC, ROM , returning her to her functional baseline. Goals:    Pt will improve knee extension ROM to -5 deg to allow proper heel strike during gait and terminal knee extension in stance   Pt will improve knee AROM flexion to >100 degrees to improve ability to perform stairs properly   Pt will improve abd strength to 5/5 to ascend 1 flight of stairs reciprocally without UE assist   Pt will increase hip and knee strength to grossly 4+/5 to be able to get up and down from the floor safely   Pt will improve SLS to 10s to improve safety with gait on uneven surfaces such as grass and gravel  Pt will be independent and compliant with comprehensive HEP to maintain progress achieved in PT      Plan: Progress with PT as outlined in POC. Date: 8/11/2023  TX#: 2/9 Date:                 TX#: 3/ Date:                 TX#: 4/ Date:                 TX#: 5/ Date:    Tx#: 6/   NuStep x5' res 3 seat 10        MT:   STM to PF joint and ITB x6'   Supine passive knee extension mobs          TE: QS x10   Bent knee fall outs 2x10 red tb   Bridge with band red tb x10   SAQ 1# 2x10          Standing TE:   Balance board x2' each   Step downs x10 4\" step   Step ups x15 Hand hold 4\"   BW x10 red TB   Side steps red TB x10   TKE's red tb 2x10        HEP: see patient instruction     Charges: 2 te 1 MT        Total Timed Treatment: 44 min  Total Treatment Time: 46 min

## 2023-08-11 NOTE — TELEPHONE ENCOUNTER
Pantoprazole and Triamterene denied. RX's were sent to [facility]. Requested Prescriptions     Pending Prescriptions Disp Refills    fluticasone propionate 50 MCG/ACT Nasal Suspension  0     Si spray by Each Nare route as needed. Refused Prescriptions Disp Refills    pantoprazole 40 MG Oral Tab EC 90 tablet 0     Sig: Take 1 tablet (40 mg total) by mouth daily. Triamterene-HCTZ 37.5-25 MG Oral Tab 30 tablet 0     Sig: Take 1 tablet by mouth daily.        LOV: 23    LAST PHYSICAL: unknown    LAST REFILL: fluticasone-unknown    LAST LAB: 23    Your Appointments      2023 10:30 AM  Physical Therapy Ortho Treatment with Juliet Tiwari, PT  [facility] Physical Therapy [facility]) [address]   2:15 PM  Physical Therapy Ortho Treatment with Rudi Frances, PT  [facility] Physical Therapy [facility]) [address]          2023 10:15 AM  Exam - Established with Star Gonzalez MD  [facility], Three [facility] ([address]) [address] [place] [address] Professor Abner Rubin 298        2023  2:15 PM  Physical Therapy Ortho Treatment with Rudi Frances, PT  [facility] Physical Therapy [facility]) [address]  [phone]        2023  2:15 PM  Physical Therapy Ortho Treatment with Rudi Rivers, PT  [facility] Physical Therapy [facility]) [address]  [phone]        2023  2:15 PM  Physical Therapy Ortho Treatment with Rudi Rivers, PT  [facility] Physical Therapy [facility]) [address]  [phone]        Wednesday September 13, 2023  2:15 PM  Physical Therapy Ortho Treatment with [place] Je, PT  [facility] Physical Therapy [facility]) [address]        Monday September 18, 2023  3:45 PM  Physical Therapy Ortho Treatment with [place] Je, PT  [facility] Physical Therapy [facility]) [address]  [phone]        Wednesday September 20, 2023  2:15 PM  Physical Therapy Ortho Treatment with Jerome Je, PT  [facility] Physical Therapy [facility]) [address]  [phone]        Tuesday October 03, 2023 10:40 AM  Physical - [facility] [place]) [address] [address] [phone]        Thursday December 14, 2023  Gastroenterology Procedure with Bud Carlin MD  [facility] (--) [address] [address] [phone]

## 2023-08-14 ENCOUNTER — OFFICE VISIT (OUTPATIENT)
Dept: PHYSICAL THERAPY | Facility: HOSPITAL | Age: 58
End: 2023-08-14
Attending: PHYSICIAN ASSISTANT
Payer: MEDICAID

## 2023-08-14 PROCEDURE — 97140 MANUAL THERAPY 1/> REGIONS: CPT

## 2023-08-14 PROCEDURE — 97110 THERAPEUTIC EXERCISES: CPT

## 2023-08-14 NOTE — PROGRESS NOTES
Diagnosis:    Chronic knee pain after total replacement of right knee joint (M25.561,G89.29,Z96. Referring Provider: Sabina Laughlin  Date of Evaluation:    8/9/23    Precautions:  None Next MD visit:   none scheduled  Date of Surgery: n/a   Insurance Primary/Secondary: BLUE CROSS MEDICAID / N/A     # Auth Visits: 9            Subjective: Tung Huffman states that her R knee is about the same, but she is happy that she is not hopping in the rain. Pain: 5/10      Objective:   Knee AROM:  8 -95 deg    (eval: 10-95)  Knee PROM:  5 -106 deg   (from eval: 5 -100 deg )    Poor isolation of quads when completing QS in supine . Unable to complete a full step down with emphasis on ecc control right le. Assessment: Added extension c strap OP to perform at home as well as TKE against t-band resistance. Added this exercise to HEP as she now has home strap. Minimal gains in AROM and PROm today compared to 1st session following mobilizations. Goals:    Pt will improve knee extension ROM to -5 deg to allow proper heel strike during gait and terminal knee extension in stance   Pt will improve knee AROM flexion to >100 degrees to improve ability to perform stairs properly   Pt will improve abd strength to 5/5 to ascend 1 flight of stairs reciprocally without UE assist   Pt will increase hip and knee strength to grossly 4+/5 to be able to get up and down from the floor safely   Pt will improve SLS to 10s to improve safety with gait on uneven surfaces such as grass and gravel  Pt will be independent and compliant with comprehensive HEP to maintain progress achieved in PT      Plan: Progress with PT as outlined in POC. Date: 8/11/2023  TX#: 2/9 Date:  8/14/2023               TX#: 3/9 Date:                 TX#: 4/ Date:                 TX#: 5/ Date:    Tx#: 6/   NuStep x5' res 3 seat 10  Nustepx5 min level 5      MT:   STM to PF joint and ITB x6'   Supine passive knee extension mobs    15x R knee flex stretch on 8\" step  2x30\" R hamstring stretch on 8\" step      TE: QS x10   Bent knee fall outs 2x10 red tb   Bridge with band red tb x10   SAQ 1# 2x10    3x10 shuttle press, 62 lbs  15x quad set with OP stretch strap and pressure into thigh on R, 5\" hold      Standing TE:   Balance board x2' each   Step downs x10 4\" step   Step ups x15 Hand hold 4\"   BW x10 red TB   Side steps red TB x10   TKE's red tb 2x10  ManuaL;  Knee flexion with posterior tibial mob GR III followed by PROM flex; knee ext with posterior femur mob and OP into tibial ext, also sup patella mob with ext OP' lateral quad release x 15 min    3 min prone knee hang with 2 lb weight on R.       15x TKE with red t band  15x knee flexion stretch c green strap. 5\" hold following 10x PROM knee      HEP: see patient instruction     Charges: 2 te 1 MT        Total Timed Treatment: 46 min  Total Treatment Time: 46 min

## 2023-08-15 ENCOUNTER — TELEPHONE (OUTPATIENT)
Dept: ORTHOPEDICS CLINIC | Facility: CLINIC | Age: 58
End: 2023-08-15

## 2023-08-15 DIAGNOSIS — M17.12 PRIMARY OSTEOARTHRITIS OF LEFT KNEE: Primary | ICD-10-CM

## 2023-08-15 NOTE — TELEPHONE ENCOUNTER
Patient called requesting gel injections for her knees, please place RX if appropriate. I notified patient of pre auth required and ordering process. Patient confirmed understanding.

## 2023-08-15 NOTE — TELEPHONE ENCOUNTER
Order signed for Synvisc one of her left knee. She cannot undergo any injections into the right knee due to her knee replacement.

## 2023-08-15 NOTE — TELEPHONE ENCOUNTER
Called patient to inform her referral has cathy placed to get Auth for left knee gel injection as the right knee was previously replaced we cannot inject that knee received no answer.  Per yuliana's message patient is aware of process of approval

## 2023-08-16 ENCOUNTER — OFFICE VISIT (OUTPATIENT)
Dept: PHYSICAL THERAPY | Facility: HOSPITAL | Age: 58
End: 2023-08-16
Attending: PHYSICIAN ASSISTANT
Payer: MEDICAID

## 2023-08-16 ENCOUNTER — OFFICE VISIT (OUTPATIENT)
Facility: LOCATION | Age: 58
End: 2023-08-16
Payer: MEDICAID

## 2023-08-16 ENCOUNTER — TELEPHONE (OUTPATIENT)
Facility: LOCATION | Age: 58
End: 2023-08-16

## 2023-08-16 DIAGNOSIS — K57.92 DIVERTICULITIS: Primary | ICD-10-CM

## 2023-08-16 DIAGNOSIS — K57.92 ACUTE DIVERTICULITIS: Primary | ICD-10-CM

## 2023-08-16 PROCEDURE — 97110 THERAPEUTIC EXERCISES: CPT

## 2023-08-16 PROCEDURE — 99213 OFFICE O/P EST LOW 20 MIN: CPT | Performed by: SURGERY

## 2023-08-16 PROCEDURE — 97140 MANUAL THERAPY 1/> REGIONS: CPT

## 2023-08-16 NOTE — PROGRESS NOTES
Diagnosis:    Chronic knee pain after total replacement of right knee joint (M25.561,G89.29,Z96. Referring Provider: Rafael Avendaño  Date of Evaluation:    8/9/23    Precautions:  None Next MD visit:   none scheduled  Date of Surgery: n/a   Insurance Primary/Secondary: BLUE CROSS MEDICAID / N/A     # Auth Visits: 9            Subjective: Corrinne Sherry reports that she had an MD appt earlier. Her knee feels alright today, she is just tired. She has been approved for a gel shot of her L knee on Sept 27th. Pain: 5/10      Objective:   Knee AROM:  6 -100 deg    (improved from 8-95)  Knee PROM:  5 -106 deg   (from eval: 5 -100 deg )    Poor isolation of quads when completing QS in supine . Unable to complete a full step down with emphasis on ecc control right le. Assessment:  Administered a Small heel lift for the R, took one layer out. Told her to take one more layer out if needed, and to stop using it if she feels pain. Improved AROM noted compared to eval with good progress towards LTG. Goals:    Pt will improve knee extension ROM to -5 deg to allow proper heel strike during gait and terminal knee extension in stance Progressing  Pt will improve knee AROM flexion to >100 degrees to improve ability to perform stairs properly. Progressing at 100 deg  Pt will improve abd strength to 5/5 to ascend 1 flight of stairs reciprocally without UE assist   Pt will increase hip and knee strength to grossly 4+/5 to be able to get up and down from the floor safely   Pt will improve SLS to 10s to improve safety with gait on uneven surfaces such as grass and gravel  Pt will be independent and compliant with comprehensive HEP to maintain progress achieved in PT. Progressing. Plan: Progress with PT as outlined in POC. Date: 8/11/2023  TX#: 2/9 Date:  8/14/2023               TX#: 3/9 Date: 8/16/2023                TX#: 4/9 Date:                 TX#: 5/ Date:    Tx#: 6/   NuStep x5' res 3 seat 10  Nustepx5 min level 5 Nustep level 5, 5 min     MT:   STM to PF joint and ITB x6'   Supine passive knee extension mobs    15x R knee flex stretch on 8\" step  2x30\" R hamstring stretch on 8\" step 5x15\" knee flex stretch on step  10x anterior step down R LE 6\"  2x10 TKE red band     TE: QS x10   Bent knee fall outs 2x10 red tb   Bridge with band red tb x10   SAQ 1# 2x10    3x10 shuttle press, 62 lbs  15x quad set with OP stretch strap and pressure into thigh on R, 5\" hold 2x10 shuttle press, 62 lbs    15x quad set with OP stretch strap and pressure into thigh on R, 5\" hold  2x10 quad set 5\" hold     Standing TE:   Balance board x2' each   Step downs x10 4\" step   Step ups x15 Hand hold 4\"   BW x10 red TB   Side steps red TB x10   TKE's red tb 2x10  ManuaL;  Knee flexion with posterior tibial mob GR III followed by PROM flex; knee ext with posterior femur mob and OP into tibial ext, also sup patella mob with ext OP' lateral quad release x 15 min    3 min prone knee hang with 2 lb weight on R. 15x knee flexion stretch c green strap. 5\" hold following 10x PROM knee     ManuaL;  Knee flexion with posterior tibial mob GR III followed by PROM flex; knee ext with posterior femur mob and OP into tibial ext, also sup patella mob with ext OP' lateral quad release x 15 min      15x TKE with red t band  15x knee flexion stretch c green strap. 5\" hold following 10x PROM knee Fitted for R heel lift, advised to take out one more layer if needed or to discontinue if pain.      HEP: see patient instruction     Charges: 2 te 1 MT        Total Timed Treatment: 46 min  Total Treatment Time: 46 min

## 2023-08-16 NOTE — PROGRESS NOTES
Follow Up Visit Note       Active Problems      1. Acute diverticulitis          Chief Complaint   Patient presents with:  Establish Care: EP - 3 week f/u diverticulitis, cscope scheduled 12/14, pt states have been experiencing discomfort when using the restroom and bowels tend to move slow, no other symptoms. History of Present Illness  The patient is a 78-year-old female whom I initially met on July 17, 2023 for a colonoscopy. The patient at that time had abdominal pain. She ended up presenting to [facility] emergency room, where CT scan indicated acute diverticulitis. She was started on Cipro and Flagyl, but had no improvement. She then was admitted to [facility] on July 21. She was treated with IV antibiotics and discharged home on Augmentin. She has subsequently completed her antibiotics. She states she had followed a low fiber diet for 2 weeks after discharge, but now is no longer following the low fiber diet. She is having 2 bowel movements a day. She does take Norco at night at times. She does take Colace daily. Allergies  Sandra is allergic to seasonal.    Past Medical / Surgical / Social / Family History    The past medical and past surgical history have been reviewed by me today. Past Medical History:   Diagnosis Date    Diabetes [facility])     Essential hypertension      Past Surgical History:   Procedure Laterality Date    EXPLORATORY OF ABDOMEN      KNEE REPLACEMENT SURGERY         The family history and social history have been reviewed by me today.     Family History   Problem Relation Age of Onset    Heart Disorder Father     Cancer Mother     Hypertension Mother      Social History    Socioeconomic History      Marital status: Legally     Tobacco Use      Smoking status: Every Day        Packs/day: 0.10        Types: Cigarettes    Substance and Sexual Activity      Alcohol use: Yes        Comment: occasional      Drug use: Never       Current Outpatient Medications: fluticasone propionate 50 MCG/ACT Nasal Suspension, 1 spray by Each Nare route as needed. , Disp: 1 each, Rfl: 1    ergocalciferol 1.25 MG (37556 UT) Oral Cap, Take 1 capsule (50,000 Units total) by mouth once a week., Disp: 12 capsule, Rfl: 0    atorvastatin 40 MG Oral Tab, Take 1 tablet (40 mg total) by mouth daily. , Disp: 90 tablet, Rfl: 0    HYDROcodone-acetaminophen  MG Oral Tab, Take 1 tablet by mouth every 8 (eight) hours as needed for Pain., Disp: 15 tablet, Rfl: 0    pantoprazole 40 MG Oral Tab EC, Take 1 tablet (40 mg total) by mouth daily. , Disp: 90 tablet, Rfl: 0    Triamterene-HCTZ 37.5-25 MG Oral Tab, Take 1 tablet by mouth daily. , Disp: 30 tablet, Rfl: 0    ZOLPIDEM TARTRATE ER 12.5 MG Oral Tab CR, TAKE 1 TABLET BY MOUTH EVERY DAY, Disp: 30 tablet, Rfl: 0    ciprofloxacin 500 MG Oral Tab, Take 1 tablet (500 mg total) by mouth every 12 (twelve) hours. For 10 days, Disp: , Rfl:     docusate sodium 100 MG Oral Cap, Take 1 capsule (100 mg total) by mouth 3 (three) times daily as needed for constipation. , Disp: , Rfl:     metRONIDAZOLE 500 MG Oral Tab, Take 1 tablet (500 mg total) by mouth 3 (three) times daily. , Disp: , Rfl:     cetirizine 10 MG Oral Tab, Take 1 tablet (10 mg total) by mouth as needed for Allergies. , Disp: , Rfl:     metFORMIN  MG Oral Tablet 24 Hr, Take 1 tablet (500 mg total) by mouth daily with dinner., Disp: 90 tablet, Rfl: 0    albuterol 108 (90 Base) MCG/ACT Inhalation Aero Soln, Inhale 2 puffs into the lungs 4 (four) times daily as needed. , Disp: 1 each, Rfl: 0     Review of Systems  The Review of Systems has been reviewed by me during today. Review of Systems   Constitutional: Negative. HENT: Negative. Eyes: Negative. Respiratory: Negative. Cardiovascular: Negative. Gastrointestinal: Negative. Genitourinary: Negative. Musculoskeletal: Negative. Skin: Negative. Neurological: Negative. Psychiatric/Behavioral: Negative. Physical Exam  Constitutional:       Appearance: Normal appearance. She is well-developed. HENT:      Head: Normocephalic and atraumatic. Eyes:      General: No scleral icterus. Conjunctiva/sclera: Conjunctivae normal.   Neck:      Vascular: No JVD. Abdominal:      General: There is no distension or abdominal bruit. Palpations: Abdomen is soft. Abdomen is not rigid. There is no shifting dullness, fluid wave, mass or pulsatile mass. Tenderness: There is no abdominal tenderness. There is no guarding or rebound. Negative signs include Vuong's sign and McBurney's sign. Hernia: No hernia is present. There is no hernia in the ventral area. Skin:     General: Skin is warm and dry. Neurological:      Mental Status: She is alert and oriented to person, place, and time. Psychiatric:         Behavior: Behavior normal.         Thought Content: Thought content normal.         Judgment: Judgment normal.          Assessment   Acute diverticulitis  (primary encounter diagnosis)    Plan   The patient is doing well. I recommended she to slowly transition to a high-fiber diet. She was provided high-fiber diet instructions. I would like to perform her colonoscopy sooner than the previously scheduled December. I have moved her colonoscopy to September 7.       Cesar Pierre MD

## 2023-08-16 NOTE — H&P (VIEW-ONLY)
Follow Up Visit Note       Active Problems      1. Acute diverticulitis          Chief Complaint   Patient presents with:  Establish Care: EP - 3 week f/u diverticulitis, cscope scheduled 12/14, pt states have been experiencing discomfort when using the restroom and bowels tend to move slow, no other symptoms. History of Present Illness  The patient is a 25-year-old female whom I initially met on July 17, 2023 for a colonoscopy. The patient at that time had abdominal pain. She ended up presenting to [facility] emergency room, where CT scan indicated acute diverticulitis. She was started on Cipro and Flagyl, but had no improvement. She then was admitted to [facility] on July 21. She was treated with IV antibiotics and discharged home on Augmentin. She has subsequently completed her antibiotics. She states she had followed a low fiber diet for 2 weeks after discharge, but now is no longer following the low fiber diet. She is having 2 bowel movements a day. She does take Norco at night at times. She does take Colace daily. Allergies  Sandra is allergic to seasonal.    Past Medical / Surgical / Social / Family History    The past medical and past surgical history have been reviewed by me today. Past Medical History:   Diagnosis Date    Diabetes [facility])     Essential hypertension      Past Surgical History:   Procedure Laterality Date    EXPLORATORY OF ABDOMEN      KNEE REPLACEMENT SURGERY         The family history and social history have been reviewed by me today.     Family History   Problem Relation Age of Onset    Heart Disorder Father     Cancer Mother     Hypertension Mother      Social History    Socioeconomic History      Marital status: Legally     Tobacco Use      Smoking status: Every Day        Packs/day: 0.10        Types: Cigarettes    Substance and Sexual Activity      Alcohol use: Yes        Comment: occasional      Drug use: Never       Current Outpatient Medications: fluticasone propionate 50 MCG/ACT Nasal Suspension, 1 spray by Each Nare route as needed. , Disp: 1 each, Rfl: 1    ergocalciferol 1.25 MG (10571 UT) Oral Cap, Take 1 capsule (50,000 Units total) by mouth once a week., Disp: 12 capsule, Rfl: 0    atorvastatin 40 MG Oral Tab, Take 1 tablet (40 mg total) by mouth daily. , Disp: 90 tablet, Rfl: 0    HYDROcodone-acetaminophen  MG Oral Tab, Take 1 tablet by mouth every 8 (eight) hours as needed for Pain., Disp: 15 tablet, Rfl: 0    pantoprazole 40 MG Oral Tab EC, Take 1 tablet (40 mg total) by mouth daily. , Disp: 90 tablet, Rfl: 0    Triamterene-HCTZ 37.5-25 MG Oral Tab, Take 1 tablet by mouth daily. , Disp: 30 tablet, Rfl: 0    ZOLPIDEM TARTRATE ER 12.5 MG Oral Tab CR, TAKE 1 TABLET BY MOUTH EVERY DAY, Disp: 30 tablet, Rfl: 0    ciprofloxacin 500 MG Oral Tab, Take 1 tablet (500 mg total) by mouth every 12 (twelve) hours. For 10 days, Disp: , Rfl:     docusate sodium 100 MG Oral Cap, Take 1 capsule (100 mg total) by mouth 3 (three) times daily as needed for constipation. , Disp: , Rfl:     metRONIDAZOLE 500 MG Oral Tab, Take 1 tablet (500 mg total) by mouth 3 (three) times daily. , Disp: , Rfl:     cetirizine 10 MG Oral Tab, Take 1 tablet (10 mg total) by mouth as needed for Allergies. , Disp: , Rfl:     metFORMIN  MG Oral Tablet 24 Hr, Take 1 tablet (500 mg total) by mouth daily with dinner., Disp: 90 tablet, Rfl: 0    albuterol 108 (90 Base) MCG/ACT Inhalation Aero Soln, Inhale 2 puffs into the lungs 4 (four) times daily as needed. , Disp: 1 each, Rfl: 0     Review of Systems  The Review of Systems has been reviewed by me during today. Review of Systems   Constitutional: Negative. HENT: Negative. Eyes: Negative. Respiratory: Negative. Cardiovascular: Negative. Gastrointestinal: Negative. Genitourinary: Negative. Musculoskeletal: Negative. Skin: Negative. Neurological: Negative. Psychiatric/Behavioral: Negative. Physical Exam  Constitutional:       Appearance: Normal appearance. She is well-developed. HENT:      Head: Normocephalic and atraumatic. Eyes:      General: No scleral icterus. Conjunctiva/sclera: Conjunctivae normal.   Neck:      Vascular: No JVD. Abdominal:      General: There is no distension or abdominal bruit. Palpations: Abdomen is soft. Abdomen is not rigid. There is no shifting dullness, fluid wave, mass or pulsatile mass. Tenderness: There is no abdominal tenderness. There is no guarding or rebound. Negative signs include Vuong's sign and McBurney's sign. Hernia: No hernia is present. There is no hernia in the ventral area. Skin:     General: Skin is warm and dry. Neurological:      Mental Status: She is alert and oriented to person, place, and time. Psychiatric:         Behavior: Behavior normal.         Thought Content: Thought content normal.         Judgment: Judgment normal.          Assessment   Acute diverticulitis  (primary encounter diagnosis)    Plan   The patient is doing well. I recommended she to slowly transition to a high-fiber diet. She was provided high-fiber diet instructions. I would like to perform her colonoscopy sooner than the previously scheduled December. I have moved her colonoscopy to September 7.       Heinz Gitelman, MD

## 2023-08-21 ENCOUNTER — OFFICE VISIT (OUTPATIENT)
Dept: PHYSICAL THERAPY | Facility: HOSPITAL | Age: 58
End: 2023-08-21
Attending: PHYSICIAN ASSISTANT
Payer: MEDICAID

## 2023-08-21 PROCEDURE — 97140 MANUAL THERAPY 1/> REGIONS: CPT

## 2023-08-21 PROCEDURE — 97110 THERAPEUTIC EXERCISES: CPT

## 2023-08-21 NOTE — PROGRESS NOTES
Diagnosis:    Chronic knee pain after total replacement of right knee joint (M25.561,G89.29,Z96. Referring Provider: Pablo Covert  Date of Evaluation:    8/9/23    Precautions:  None Next MD visit:   none scheduled  Date of Surgery: n/a   Insurance Primary/Secondary: BLUE CROSS MEDICAID / N/A     # Auth Visits: 9            Subjective: Maxwell Emery reports increased stiffness in R knee today, she states it is stiffer with walking. She has been practicing going down stairs. She took the heel lift out for her comfort. Pain: 5/10      Objective:   Knee AROM:  6 -101 deg    (improved from 8-95)  Knee PROM:  5 -106 deg   (from eval: 5 -100 deg )    Poor isolation of quads when completing QS in supine . Unable to complete a full step down with emphasis on ecc control right leg. Assessment:  Maxwell mEery continues to maintain gains in AROM from previous session. She is able to do squats with improved form with cuing, and tolerate bosu lunges without issue. Advised her to attempt prone hangs at home. Goals:    Pt will improve knee extension ROM to -5 deg to allow proper heel strike during gait and terminal knee extension in stance Progressing  Pt will improve knee AROM flexion to >100 degrees to improve ability to perform stairs properly. Progressing at 100 deg  Pt will improve abd strength to 5/5 to ascend 1 flight of stairs reciprocally without UE assist   Pt will increase hip and knee strength to grossly 4+/5 to be able to get up and down from the floor safely   Pt will improve SLS to 10s to improve safety with gait on uneven surfaces such as grass and gravel  Pt will be independent and compliant with comprehensive HEP to maintain progress achieved in PT. Progressing. Plan: Progress with PT as outlined in POC. Date: 8/11/2023  TX#: 2/9 Date:  8/14/2023               TX#: 3/9 Date: 8/16/2023                TX#: 4/9 Date: 8/21/2023              TX#: 5/9 Date:    Tx#: 6/   NuStep x5' res 3 seat 10  Nustepx5 min level 5 Nustep level 5, 5 min Nustep level 5, 5 min    MT:   STM to PF joint and ITB x6'   Supine passive knee extension mobs    15x R knee flex stretch on 8\" step  2x30\" R hamstring stretch on 8\" step 5x15\" knee flex stretch on step  10x anterior step down R LE 6\"  2x10 TKE red band 15x5\" knee flex stretch on step, 8\"  2x10 shuttle press for increased flex 62 lbs    TE: QS x10   Bent knee fall outs 2x10 red tb   Bridge with band red tb x10   SAQ 1# 2x10    3x10 shuttle press, 62 lbs  15x quad set with OP stretch strap and pressure into thigh on R, 5\" hold 2x10 shuttle press, 62 lbs    15x quad set with OP stretch strap and pressure into thigh on R, 5\" hold  2x10 quad set 5\" hold 2x10 squats HHA  15x5\" TKE red band standing   15x anterior step down R LE 6\"  15x fwd lunge to bosu    Standing TE:   Balance board x2' each   Step downs x10 4\" step   Step ups x15 Hand hold 4\"   BW x10 red TB   Side steps red TB x10   TKE's red tb 2x10  ManuaL;  Knee flexion with posterior tibial mob GR III followed by PROM flex; knee ext with posterior femur mob and OP into tibial ext, also sup patella mob with ext OP' lateral quad release x 15 min    3 min prone knee hang with 2 lb weight on R. 15x knee flexion stretch c green strap. 5\" hold following 10x PROM knee     ManuaL;  Knee flexion with posterior tibial mob GR III followed by PROM flex; knee ext with posterior femur mob and OP into tibial ext, also sup patella mob with ext OP' lateral quad release x 15 min 15x quad set with OP stretch strap and pressure into thigh on R, 5\" hold     15x TKE with red t band  15x knee flexion stretch c green strap. 5\" hold following 10x PROM knee Fitted for R heel lift, advised to take out one more layer if needed or to discontinue if pain.      HEP: see patient instruction     Charges: 2 te 1 MT        Total Timed Treatment: 46 min  Total Treatment Time: 46 min

## 2023-08-22 ENCOUNTER — HOSPITAL ENCOUNTER (OUTPATIENT)
Dept: MAMMOGRAPHY | Age: 58
Discharge: HOME | End: 2023-08-22
Attending: INTERNAL MEDICINE
Payer: MEDICAID

## 2023-08-22 DIAGNOSIS — Z12.31 ENCOUNTER FOR SCREENING MAMMOGRAM FOR MALIGNANT NEOPLASM OF BREAST: ICD-10-CM

## 2023-08-22 PROCEDURE — 77067 SCR MAMMO BI INCL CAD: CPT | Performed by: INTERNAL MEDICINE

## 2023-08-22 PROCEDURE — 77063 BREAST TOMOSYNTHESIS BI: CPT | Performed by: INTERNAL MEDICINE

## 2023-08-25 ENCOUNTER — TELEPHONE (OUTPATIENT)
Facility: LOCATION | Age: 58
End: 2023-08-25

## 2023-08-25 NOTE — TELEPHONE ENCOUNTER
Transaction ID: [id number] ID: [id number] Date: 2023-08-25  Stevan Espino Patient  Member ID  [id number]    Date of Birth  1965-02-01    Gender  NA    Transaction Type  Outpatient Authorization    Organization  [address] [place] [address]    Payer  [address] logo  Certificate Information  Certification Number  [id number]    Status  CERTIFIED IN TOTAL    Service Information  Service Type  2 - Surgical    Place of Service  25 - [facility]    Service From - To Date  2023-09-07 - 2023-12-07    Diagnosis Code 1   - Dvtrcli of intest part unsp w/o perf or abscess w/o bleed    Procedure Code 1 (CPT/HCPCS)  77452 - DIAGNOSTIC COLONOSCOPY    Quantity  1 Units    Status  CERTIFIED IN TOTAL    Rendering Providers     Provider 1  Name  Randall Schulz    NPI  [id number]    Provider Role  Attending    Address  [address]    Provider 2  Name  [facility]RI[facility]    NPI  [id number]    Provider Role  Provider Organization    Address  [address] [place] [address]

## 2023-08-28 ENCOUNTER — OFFICE VISIT (OUTPATIENT)
Dept: PHYSICAL THERAPY | Facility: HOSPITAL | Age: 58
End: 2023-08-28
Attending: INTERNAL MEDICINE
Payer: MEDICAID

## 2023-08-28 PROCEDURE — 97140 MANUAL THERAPY 1/> REGIONS: CPT

## 2023-08-28 PROCEDURE — 97110 THERAPEUTIC EXERCISES: CPT

## 2023-08-28 NOTE — PROGRESS NOTES
Diagnosis:    Chronic knee pain after total replacement of right knee joint (M25.561,G89.29,Z96. Referring Provider: Wood Brady  Date of Evaluation:    8/9/23    Precautions:  None Next MD visit:   none scheduled  Date of Surgery: n/a   Insurance Primary/Secondary: BLUE CROSS MEDICAID / N/A     # Auth Visits: 9            Subjective: Emile Ruiz states her R knee is sore, she was in the hospital over the weekend with her , he had a CVA over the weekend. She states she slept on one of those hospital cots. She will get her shot on the 30th. Pain: 5/10      Objective:   Knee AROM:  5 -95 deg    (improved from 8-95)  Knee PROM:  3 -105 deg   (from eval: 5 -100 deg )    Poor isolation of quads when completing QS in supine . Unable to complete a full step down with emphasis on ecc control right leg. Assessment:       Emile Ruiz was able to tolerate side stepping with constant knee bend with good gait pattern and no pain. She could also do split squats with improved knee bend. Her flexion AROM was less than last session, but extension was mildly improved. Advised to cont HEP at home to maximize gains. Goals:    Pt will improve knee extension ROM to -5 deg to allow proper heel strike during gait and terminal knee extension in stance Progressing  Pt will improve knee AROM flexion to >100 degrees to improve ability to perform stairs properly. Progressing at 100 deg  Pt will improve abd strength to 5/5 to ascend 1 flight of stairs reciprocally without UE assist   Pt will increase hip and knee strength to grossly 4+/5 to be able to get up and down from the floor safely   Pt will improve SLS to 10s to improve safety with gait on uneven surfaces such as grass and gravel  Pt will be independent and compliant with comprehensive HEP to maintain progress achieved in PT. Progressing. Plan: Progress with PT as outlined in POC.    Date: 8/11/2023  TX#: 2/9 Date:  8/14/2023               TX#: 3/9 Date: 8/16/2023 TX#: 4/9 Date: 8/21/2023              TX#: 5/9 Date: 8/28/2023   Tx#: 6/9   NuStep x5' res 3 seat 10  Nustepx5 min level 5 Nustep level 5, 5 min Nustep level 5, 5 min Nustep level 5, 5 min   MT:   STM to PF joint and ITB x6'   Supine passive knee extension mobs    15x R knee flex stretch on 8\" step  2x30\" R hamstring stretch on 8\" step 5x15\" knee flex stretch on step  10x anterior step down R LE 6\"  2x10 TKE red band 15x5\" knee flex stretch on step, 8\"  2x10 shuttle press for increased flex 62 lbs 6x10\" hold R knee flex stretch on 8\" step   TE: QS x10   Bent knee fall outs 2x10 red tb   Bridge with band red tb x10   SAQ 1# 2x10    3x10 shuttle press, 62 lbs  15x quad set with OP stretch strap and pressure into thigh on R, 5\" hold 2x10 shuttle press, 62 lbs    15x quad set with OP stretch strap and pressure into thigh on R, 5\" hold  2x10 quad set 5\" hold 2x10 squats HHA  15x5\" TKE red band standing   15x anterior step down R LE 6\"  15x fwd lunge to bosu 10x split squat B with chair  2x lateral walk green spri bands c knee bend  15x TKE iso hold ball at wall   Standing TE:   Balance board x2' each   Step downs x10 4\" step   Step ups x15 Hand hold 4\"   BW x10 red TB   Side steps red TB x10   TKE's red tb 2x10  ManuaL;  Knee flexion with posterior tibial mob GR III followed by PROM flex; knee ext with posterior femur mob and OP into tibial ext, also sup patella mob with ext OP' lateral quad release x 15 min    3 min prone knee hang with 2 lb weight on R. 15x knee flexion stretch c green strap. 5\" hold following 10x PROM knee     ManuaL;  Knee flexion with posterior tibial mob GR III followed by PROM flex; knee ext with posterior femur mob and OP into tibial ext, also sup patella mob with ext OP' lateral quad release x 15 min 15x quad set with OP stretch strap and pressure into thigh on R, 5\" hold ManuaL;  Knee flexion with posterior tibial mob GR III followed by PROM flex; knee ext with posterior femur mob and OP into tibial ext, also sup patella mob with ext OP' lateral quad release x 15 min  10x knee flexion AAROM R    15x TKE with red t band  15x knee flexion stretch c green strap. 5\" hold following 10x PROM knee Fitted for R heel lift, advised to take out one more layer if needed or to discontinue if pain.  -   HEP: see patient instruction     Charges: 2 te 1 MT        Total Timed Treatment: 46 min  Total Treatment Time: 46 min

## 2023-08-30 ENCOUNTER — TELEPHONE (OUTPATIENT)
Dept: INTERNAL MEDICINE CLINIC | Facility: CLINIC | Age: 58
End: 2023-08-30

## 2023-08-30 ENCOUNTER — OFFICE VISIT (OUTPATIENT)
Dept: ORTHOPEDICS CLINIC | Facility: CLINIC | Age: 58
End: 2023-08-30
Payer: MEDICAID

## 2023-08-30 VITALS — BODY MASS INDEX: 28.72 KG/M2 | WEIGHT: 183 LBS | HEIGHT: 67 IN

## 2023-08-30 DIAGNOSIS — M17.12 PRIMARY OSTEOARTHRITIS OF LEFT KNEE: Primary | ICD-10-CM

## 2023-08-30 PROCEDURE — 20610 DRAIN/INJ JOINT/BURSA W/O US: CPT | Performed by: PHYSICIAN ASSISTANT

## 2023-08-30 PROCEDURE — 3008F BODY MASS INDEX DOCD: CPT | Performed by: PHYSICIAN ASSISTANT

## 2023-09-07 ENCOUNTER — PATIENT OUTREACH (OUTPATIENT)
Facility: LOCATION | Age: 58
End: 2023-09-07

## 2023-09-07 ENCOUNTER — HOSPITAL ENCOUNTER (OUTPATIENT)
Facility: HOSPITAL | Age: 58
Setting detail: HOSPITAL OUTPATIENT SURGERY
Discharge: HOME | End: 2023-09-07
Attending: SURGERY | Admitting: SURGERY
Payer: MEDICAID

## 2023-09-07 ENCOUNTER — ANESTHESIA EVENT (OUTPATIENT)
Dept: ENDOSCOPY | Facility: HOSPITAL | Age: 58
End: 2023-09-07
Payer: MEDICAID

## 2023-09-07 ENCOUNTER — ANESTHESIA (OUTPATIENT)
Dept: ENDOSCOPY | Facility: HOSPITAL | Age: 58
End: 2023-09-07
Payer: MEDICAID

## 2023-09-07 VITALS
DIASTOLIC BLOOD PRESSURE: 91 MMHG | RESPIRATION RATE: 16 BRPM | HEIGHT: 67 IN | TEMPERATURE: 99 F | OXYGEN SATURATION: 100 % | BODY MASS INDEX: 28.72 KG/M2 | WEIGHT: 183 LBS | SYSTOLIC BLOOD PRESSURE: 106 MMHG | HEART RATE: 60 BPM

## 2023-09-07 DIAGNOSIS — K57.92 DIVERTICULITIS: ICD-10-CM

## 2023-09-07 LAB — GLUCOSE BLD-MCNC: 145 MG/DL (ref 70–99)

## 2023-09-07 PROCEDURE — 0DJD8ZZ INSPECTION OF LOWER INTESTINAL TRACT, VIA NATURAL OR ARTIFICIAL OPENING ENDOSCOPIC: ICD-10-PCS | Performed by: SURGERY

## 2023-09-07 PROCEDURE — 82962 GLUCOSE BLOOD TEST: CPT

## 2023-09-07 RX ORDER — ACETAMINOPHEN 500 MG
1000 TABLET ORAL ONCE AS NEEDED
Status: DISCONTINUED | OUTPATIENT
Start: 2023-09-07 | End: 2023-09-07

## 2023-09-07 RX ORDER — HYDROMORPHONE HYDROCHLORIDE 1 MG/ML
0.4 INJECTION, SOLUTION INTRAMUSCULAR; INTRAVENOUS; SUBCUTANEOUS EVERY 5 MIN PRN
Status: DISCONTINUED | OUTPATIENT
Start: 2023-09-07 | End: 2023-09-07

## 2023-09-07 RX ORDER — NICOTINE POLACRILEX 4 MG
30 LOZENGE BUCCAL
Status: DISCONTINUED | OUTPATIENT
Start: 2023-09-07 | End: 2023-09-07

## 2023-09-07 RX ORDER — MIDAZOLAM HYDROCHLORIDE 1 MG/ML
1 INJECTION INTRAMUSCULAR; INTRAVENOUS EVERY 5 MIN PRN
Status: DISCONTINUED | OUTPATIENT
Start: 2023-09-07 | End: 2023-09-07

## 2023-09-07 RX ORDER — SODIUM CHLORIDE, SODIUM LACTATE, POTASSIUM CHLORIDE, CALCIUM CHLORIDE 600; 310; 30; 20 MG/100ML; MG/100ML; MG/100ML; MG/100ML
INJECTION, SOLUTION INTRAVENOUS CONTINUOUS
Status: DISCONTINUED | OUTPATIENT
Start: 2023-09-07 | End: 2023-09-07

## 2023-09-07 RX ORDER — DEXTROSE MONOHYDRATE 25 G/50ML
50 INJECTION, SOLUTION INTRAVENOUS
Status: DISCONTINUED | OUTPATIENT
Start: 2023-09-07 | End: 2023-09-07

## 2023-09-07 RX ORDER — NALOXONE HYDROCHLORIDE 0.4 MG/ML
80 INJECTION, SOLUTION INTRAMUSCULAR; INTRAVENOUS; SUBCUTANEOUS AS NEEDED
Status: DISCONTINUED | OUTPATIENT
Start: 2023-09-07 | End: 2023-09-07

## 2023-09-07 RX ORDER — LABETALOL HYDROCHLORIDE 5 MG/ML
5 INJECTION, SOLUTION INTRAVENOUS EVERY 5 MIN PRN
Status: DISCONTINUED | OUTPATIENT
Start: 2023-09-07 | End: 2023-09-07

## 2023-09-07 RX ORDER — MEPERIDINE HYDROCHLORIDE 25 MG/ML
12.5 INJECTION INTRAMUSCULAR; INTRAVENOUS; SUBCUTANEOUS AS NEEDED
Status: DISCONTINUED | OUTPATIENT
Start: 2023-09-07 | End: 2023-09-07

## 2023-09-07 RX ORDER — HYDROCODONE BITARTRATE AND ACETAMINOPHEN 5; 325 MG/1; MG/1
1 TABLET ORAL ONCE AS NEEDED
Status: DISCONTINUED | OUTPATIENT
Start: 2023-09-07 | End: 2023-09-07

## 2023-09-07 RX ORDER — HYDROCODONE BITARTRATE AND ACETAMINOPHEN 5; 325 MG/1; MG/1
2 TABLET ORAL ONCE AS NEEDED
Status: DISCONTINUED | OUTPATIENT
Start: 2023-09-07 | End: 2023-09-07

## 2023-09-07 RX ORDER — PROCHLORPERAZINE EDISYLATE 5 MG/ML
5 INJECTION INTRAMUSCULAR; INTRAVENOUS EVERY 8 HOURS PRN
Status: DISCONTINUED | OUTPATIENT
Start: 2023-09-07 | End: 2023-09-07

## 2023-09-07 RX ORDER — HYDROMORPHONE HYDROCHLORIDE 1 MG/ML
0.6 INJECTION, SOLUTION INTRAMUSCULAR; INTRAVENOUS; SUBCUTANEOUS EVERY 5 MIN PRN
Status: DISCONTINUED | OUTPATIENT
Start: 2023-09-07 | End: 2023-09-07

## 2023-09-07 RX ORDER — LIDOCAINE HYDROCHLORIDE 10 MG/ML
INJECTION, SOLUTION EPIDURAL; INFILTRATION; INTRACAUDAL; PERINEURAL AS NEEDED
Status: DISCONTINUED | OUTPATIENT
Start: 2023-09-07 | End: 2023-09-07 | Stop reason: SURG

## 2023-09-07 RX ORDER — HYDROMORPHONE HYDROCHLORIDE 1 MG/ML
0.2 INJECTION, SOLUTION INTRAMUSCULAR; INTRAVENOUS; SUBCUTANEOUS EVERY 5 MIN PRN
Status: DISCONTINUED | OUTPATIENT
Start: 2023-09-07 | End: 2023-09-07

## 2023-09-07 RX ORDER — ONDANSETRON 2 MG/ML
4 INJECTION INTRAMUSCULAR; INTRAVENOUS EVERY 6 HOURS PRN
Status: DISCONTINUED | OUTPATIENT
Start: 2023-09-07 | End: 2023-09-07

## 2023-09-07 RX ORDER — NICOTINE POLACRILEX 4 MG
15 LOZENGE BUCCAL
Status: DISCONTINUED | OUTPATIENT
Start: 2023-09-07 | End: 2023-09-07

## 2023-09-07 RX ADMIN — LIDOCAINE HYDROCHLORIDE 50 MG: 10 INJECTION, SOLUTION EPIDURAL; INFILTRATION; INTRACAUDAL; PERINEURAL at 07:22:00

## 2023-09-07 NOTE — ANESTHESIA POSTPROCEDURE EVALUATION
Faustino Duong 169 Patient Status:  Hospital Outpatient Surgery   Age/Gender 59year old female MRN [id number]   Location [address] [facility] 28 Attending Blake Cope MD   [facility] Day # 0 PCP Phillip Chaves DO       Anesthesia Post-op Note    COLONOSCOPY    Procedure Summary       Date: 09/07/23 Room / Location: [address] [facility] ENDOSCOPY 04 / [facility] ENDOSCOPY    Anesthesia Start: 0720 Anesthesia Stop: 0801    Procedure: COLONOSCOPY Diagnosis:       Diverticulitis      (Diverticulosis)    Surgeons: Blake Cope MD Anesthesiologist: Dean Frederick MD    Anesthesia Type: MAC ASA Status: 2            Anesthesia Type: MAC    Vitals Value Taken Time   /91 09/07/23 0820   Temp  09/07/23 0823   Pulse 60 09/07/23 0820   Resp 16 09/07/23 0757   SpO2 100 % 09/07/23 0820       Patient Location: Endoscopy    Anesthesia Type: MAC    Airway Patency: patent    Postop Pain Control: adequate    Mental Status: mildly sedated but able to meaningfully participate in the post-anesthesia evaluation    Nausea/Vomiting: none    Cardiopulmonary/Hydration status: stable euvolemic    Complications: no apparent anesthesia related complications    Postop vital signs: stable    Dental Exam: Unchanged from Preop    Patient to be discharged home when criteria met.

## 2023-09-07 NOTE — OPERATIVE REPORT
[facility]                                                                                              Colonoscopy Operative Report    Atul Ovalle Patient Status:  Hospital Outpatient Surgery    1965 MRN [id number]   Location [address] [address] Attending Ricardo Gaxiola MD   [address] [facility] [address] Day #   0 PCP Darien Valle DO     Date of Operation: 2023     Pre-Operative Diagnosis: Diverticulitis [K57.92]    Post-Operative Diagnosis: Diverticulosis    Procedure Performed: Procedure(s):  COLONOSCOPY    Informed Consent: Informed consent for both the procedure and sedation were obtained from the patient. The potentially life-threatening complications of sedation, bleeding,  perforation, transfusion or repeat endoscopy  were reviewed along with the possible need for hospitalization, surgical management, transfusion or repeat endoscopy should one of these complications arise. The patient understands and is agreeable to proceed. Sedation Type: MAC-Patient received sedation with monitored anesthesia provided by an anesthesiologist  Date of previous colonoscopy: Date unknown. Procedure Description: The patient was placed in the left lateral decubitus position. After careful digital rectal examination, the Adult colonoscope was inserted into the rectum and advanced to the level of the cecum under direct visualization. The cecum was identified by landmarks, including the appendiceal orifice and ileoceccal valve. Careful examination of the entire colon was performed during withdrawal of the endoscope. The scope was withdrawn to the rectum and retroflexion was performed. The patient tolerated the procedure well with no immediate complications. The patient was transferred to the recovery area in stable condition.   Quality of Preparation: Adequate  [place] Bowel Prep Score:        Right Colon: 2        Transverse Colon: 3 Left Colon: 2  Total Score:  7    Findings: The patient has a tortuous colon. She had multiple wide-mouthed diverticulum of the sigmoid and descending colon. She had no polyps or other lesions. Recommendations: She will not need another colonoscopy outside of current screening guidelines, which are every 10 years after the age of 39. If her personal or family medical history changes concerning for colon cancer, she should follow up sooner. Follow-up:  In 10 years  Carmen Newell MD  9/7/2023  7:55 AM

## 2023-09-07 NOTE — INTERVAL H&P NOTE
Pre-op Diagnosis: Diverticulitis [K57.92]    The above referenced H&P was reviewed by Zack Coombs MD on 9/7/2023, the patient was examined and no significant changes have occurred in the patient's condition since the H&P was performed. I discussed with the patient and/or legal representative the potential benefits, risks and side effects of this procedure; the likelihood of the patient achieving goals; and potential problems that might occur during recuperation. I discussed reasonable alternatives to the procedure, including risks, benefits and side effects related to the alternatives and risks related to not receiving this procedure. We will proceed with procedure as planned.

## 2023-09-08 ENCOUNTER — OFFICE VISIT (OUTPATIENT)
Dept: PHYSICAL THERAPY | Facility: HOSPITAL | Age: 58
End: 2023-09-08
Attending: INTERNAL MEDICINE
Payer: MEDICAID

## 2023-09-08 PROCEDURE — 97140 MANUAL THERAPY 1/> REGIONS: CPT

## 2023-09-08 PROCEDURE — 97110 THERAPEUTIC EXERCISES: CPT

## 2023-09-08 NOTE — PROGRESS NOTES
Diagnosis:    Chronic knee pain after total replacement of right knee joint (M25.561,G89.29,Z96. Referring Provider: Maurice Bernal  Date of Evaluation:    8/9/23    Precautions:  None Next MD visit:   none scheduled  Date of Surgery: n/a   Insurance Primary/Secondary: BLUE CROSS MEDICAID / N/A     # Auth Visits: 9            Subjective: Remington Torres states that her R big toe is sore due to an ingrown toe nail. Her knee is also stiff. Pain: 5/10      Objective:       Pre- manual AROM: 5- 91 deg  Knee AROM:  4 -95 deg    (improved from 8-95)  Knee PROM:  3 -105 deg   (from eval: 5 -100 deg )    Poor isolation of quads when completing QS in supine . Unable to complete a full step down with emphasis on ecc control right leg. Assessment:       Sandra's AROM measurements were limited today as she was stiff from not being in therapy for a week. She was able to reach her measurements from last week following manual therapy. Continuing to incorporate functional strengthening to improve mobility of the joint. Goals:    Pt will improve knee extension ROM to -5 deg to allow proper heel strike during gait and terminal knee extension in stance Progressing  Pt will improve knee AROM flexion to >100 degrees to improve ability to perform stairs properly. Progressing at 100 deg  Pt will improve abd strength to 5/5 to ascend 1 flight of stairs reciprocally without UE assist   Pt will increase hip and knee strength to grossly 4+/5 to be able to get up and down from the floor safely   Pt will improve SLS to 10s to improve safety with gait on uneven surfaces such as grass and gravel  Pt will be independent and compliant with comprehensive HEP to maintain progress achieved in PT. Progressing. Plan: Progress with PT as outlined in POC.    Date: 8/11/2023  TX#: 2/9 Date:  8/14/2023               TX#: 3/9 Date: 8/16/2023                TX#: 4/9 Date: 8/21/2023              TX#: 5/9 Date: 8/28/2023   Tx#: 6/9 Date:9/8/2023 Tx#:7/9   NuStep x5' res 3 seat 10  Nustepx5 min level 5 Nustep level 5, 5 min Nustep level 5, 5 min Nustep level 5, 5 min Nustep level 5x 5 min   MT:   STM to PF joint and ITB x6'   Supine passive knee extension mobs    15x R knee flex stretch on 8\" step  2x30\" R hamstring stretch on 8\" step 5x15\" knee flex stretch on step  10x anterior step down R LE 6\"  2x10 TKE red band 15x5\" knee flex stretch on step, 8\"  2x10 shuttle press for increased flex 62 lbs 6x10\" hold R knee flex stretch on 8\" step 15x5\"  knee flex stretch on reebok  2x10 hamstring curls with grey band   TE: QS x10   Bent knee fall outs 2x10 red tb   Bridge with band red tb x10   SAQ 1# 2x10    3x10 shuttle press, 62 lbs  15x quad set with OP stretch strap and pressure into thigh on R, 5\" hold 2x10 shuttle press, 62 lbs    15x quad set with OP stretch strap and pressure into thigh on R, 5\" hold  2x10 quad set 5\" hold 2x10 squats HHA  15x5\" TKE red band standing   15x anterior step down R LE 6\"  15x fwd lunge to bosu 10x split squat B with chair  2x lateral walk green spri bands c knee bend  15x TKE iso hold ball at wall 2x10 leg press with deep knee flexion 50 lbs  2x10 SL leg press R 50 lbs   Standing TE:   Balance board x2' each   Step downs x10 4\" step   Step ups x15 Hand hold 4\"   BW x10 red TB   Side steps red TB x10   TKE's red tb 2x10  ManuaL;  Knee flexion with posterior tibial mob GR III followed by PROM flex; knee ext with posterior femur mob and OP into tibial ext, also sup patella mob with ext OP' lateral quad release x 15 min    3 min prone knee hang with 2 lb weight on R. 15x knee flexion stretch c green strap. 5\" hold following 10x PROM knee     ManuaL;  Knee flexion with posterior tibial mob GR III followed by PROM flex; knee ext with posterior femur mob and OP into tibial ext, also sup patella mob with ext OP' lateral quad release x 15 min 15x quad set with OP stretch strap and pressure into thigh on R, 5\" hold ManuaL;  Knee flexion with posterior tibial mob GR III followed by PROM flex; knee ext with posterior femur mob and OP into tibial ext, also sup patella mob with ext OP' lateral quad release x 15 min  10x knee flexion AAROM R ManuaL;  Knee flexion with posterior tibial mob GR III followed by PROM flex; knee ext with posterior femur mob and OP into tibial ext, also sup patella mob with ext OP' lateral quad release x 15 min    15x TKE with red t band  15x knee flexion stretch c green strap. 5\" hold following 10x PROM knee Fitted for R heel lift, advised to take out one more layer if needed or to discontinue if pain.   - -15x squats   HEP: see patient instruction     Charges: 2 te 1 MT        Total Timed Treatment: 46 min  Total Treatment Time: 46 min

## 2023-09-11 ENCOUNTER — TELEPHONE (OUTPATIENT)
Dept: PHYSICAL THERAPY | Facility: HOSPITAL | Age: 58
End: 2023-09-11

## 2023-09-11 DIAGNOSIS — G47.00 INSOMNIA, UNSPECIFIED TYPE: ICD-10-CM

## 2023-09-12 RX ORDER — ZOLPIDEM TARTRATE 12.5 MG/1
12.5 TABLET, FILM COATED, EXTENDED RELEASE ORAL DAILY
Qty: 30 TABLET | Refills: 0 | Status: SHIPPED | OUTPATIENT
Start: 2023-09-12

## 2023-09-13 ENCOUNTER — OFFICE VISIT (OUTPATIENT)
Dept: PHYSICAL THERAPY | Facility: HOSPITAL | Age: 58
End: 2023-09-13
Attending: INTERNAL MEDICINE
Payer: MEDICAID

## 2023-09-13 PROCEDURE — 97140 MANUAL THERAPY 1/> REGIONS: CPT

## 2023-09-13 PROCEDURE — 97110 THERAPEUTIC EXERCISES: CPT

## 2023-09-15 ENCOUNTER — HOSPITAL ENCOUNTER (OUTPATIENT)
Dept: GENERAL RADIOLOGY | Age: 58
Discharge: HOME | End: 2023-09-15
Attending: PHYSICAL MEDICINE & REHABILITATION
Payer: MEDICAID

## 2023-09-15 ENCOUNTER — OFFICE VISIT (OUTPATIENT)
Dept: PHYSICAL MEDICINE AND REHAB | Facility: CLINIC | Age: 58
End: 2023-09-15
Payer: MEDICAID

## 2023-09-15 VITALS — WEIGHT: 183 LBS | HEIGHT: 67 IN | RESPIRATION RATE: 18 BRPM | BODY MASS INDEX: 28.72 KG/M2

## 2023-09-15 DIAGNOSIS — M54.16 LUMBAR RADICULITIS: Primary | ICD-10-CM

## 2023-09-15 DIAGNOSIS — M47.816 LUMBAR SPONDYLOSIS: ICD-10-CM

## 2023-09-15 DIAGNOSIS — M54.16 LUMBAR RADICULITIS: ICD-10-CM

## 2023-09-15 PROCEDURE — 99204 OFFICE O/P NEW MOD 45 MIN: CPT | Performed by: PHYSICAL MEDICINE & REHABILITATION

## 2023-09-15 PROCEDURE — 72110 X-RAY EXAM L-2 SPINE 4/>VWS: CPT | Performed by: PHYSICAL MEDICINE & REHABILITATION

## 2023-09-15 PROCEDURE — 3008F BODY MASS INDEX DOCD: CPT | Performed by: PHYSICAL MEDICINE & REHABILITATION

## 2023-09-18 ENCOUNTER — TELEPHONE (OUTPATIENT)
Dept: ORTHOPEDICS CLINIC | Facility: CLINIC | Age: 58
End: 2023-09-18

## 2023-09-18 ENCOUNTER — OFFICE VISIT (OUTPATIENT)
Dept: PHYSICAL THERAPY | Facility: HOSPITAL | Age: 58
End: 2023-09-18
Attending: INTERNAL MEDICINE
Payer: MEDICAID

## 2023-09-18 PROCEDURE — 97110 THERAPEUTIC EXERCISES: CPT

## 2023-09-18 PROCEDURE — 97140 MANUAL THERAPY 1/> REGIONS: CPT

## 2023-09-19 ENCOUNTER — TELEPHONE (OUTPATIENT)
Dept: INTERNAL MEDICINE CLINIC | Facility: CLINIC | Age: 58
End: 2023-09-19

## 2023-09-19 DIAGNOSIS — Z13.228 SCREENING FOR METABOLIC DISORDER: ICD-10-CM

## 2023-09-19 DIAGNOSIS — Z00.00 ROUTINE GENERAL MEDICAL EXAMINATION AT A HEALTH CARE FACILITY: Primary | ICD-10-CM

## 2023-09-19 DIAGNOSIS — Z13.0 SCREENING FOR DISORDER OF BLOOD AND BLOOD-FORMING ORGANS: ICD-10-CM

## 2023-09-19 DIAGNOSIS — Z13.220 SCREENING FOR LIPID DISORDERS: ICD-10-CM

## 2023-09-19 DIAGNOSIS — Z13.29 SCREENING FOR THYROID DISORDER: ICD-10-CM

## 2023-09-19 NOTE — TELEPHONE ENCOUNTER
Orders to     Jori Peguero         Pt aware to get labs done no sooner than 2 weeks prior to the appt. Pt aware to fast.  No call back required. Pt is getting her labs done tomorrow. Please enter for pt.   High TE    Future Appointments   Date Time Provider Gabe Middleton   10/4/2023 10:00 AM Mariaelena Valle, DO [facility]

## 2023-09-20 ENCOUNTER — OFFICE VISIT (OUTPATIENT)
Dept: PHYSICAL THERAPY | Facility: HOSPITAL | Age: 58
End: 2023-09-20
Attending: INTERNAL MEDICINE
Payer: MEDICAID

## 2023-09-20 DIAGNOSIS — M54.16 LUMBAR RADICULITIS: Primary | ICD-10-CM

## 2023-09-20 DIAGNOSIS — M47.816 LUMBAR SPONDYLOSIS: ICD-10-CM

## 2023-09-20 PROCEDURE — 97110 THERAPEUTIC EXERCISES: CPT

## 2023-09-20 PROCEDURE — 97140 MANUAL THERAPY 1/> REGIONS: CPT

## 2023-09-20 NOTE — PROGRESS NOTES
Diagnosis:    Chronic knee pain after total replacement of right knee joint (M25.561,G89.29,Z96. Referring Provider: Queen Sara  Date of Evaluation:    8/9/23    Precautions:  None Next MD visit:   none scheduled  Date of Surgery: n/a   Insurance Primary/Secondary: BLUE CROSS MEDICAID / N/A     # Auth Visits: 12 updated per [facility]            Subjective: Vinh Connor reports continued L posterior thigh pain, 8/10. Pain: 5/10 R knee, L thigh: 8/10      Objective:     Strength: (* denotes performed with pain)  Hip Knee Foot/Ankle   Flexion: R 5; L 5  Extension: NT  Abduction: R 4-; L 4+  ER: R 4+; L 5  IR: R 5; L 5 Flexion: R 5; L 5  Extension: R 5; L 5    DF: 5/5 B  PF:5/5 B         Accessory motion: limited with anterior posterior glides; patellar mobility limited      R Knee AROM:  3 -100 deg    (improved from 4-96)  R Knee PROM:  3 -106 deg   (from eval: 5 -100 deg )    L knee AROM: 0-113 deg      SLS: R: <5\", L: 5\" wobbly    Assessment:       Vinh Connor is able to peform hamstring curls without pain or compensation. Her squat form has improved as well with TRX squats. Her AROM continues to be the same as last visit, but decreased pain over all on the R.     Goals:    Pt will improve knee extension ROM to -5 deg to allow proper heel strike during gait and terminal knee extension in stance Progressing  Pt will improve knee AROM flexion to >100 degrees to improve ability to perform stairs properly. Progressing at 100 deg. Progressing at 100 deg. Pt will improve abd strength to 5/5 to ascend 1 flight of stairs reciprocally without UE assist. Progressing. Pt will increase hip and knee strength to grossly 4+/5 to be able to get up and down from the floor safely. Goal met. Pt will improve SLS to 10s to improve safety with gait on uneven surfaces such as grass and gravel  Pt will be independent and compliant with comprehensive HEP to maintain progress achieved in PT. Goal met.     Post LEFS Score  Post LEFS Score: 72.5 % (9/18/2023  3:45 PM)    72.5 % improvement    Plan: 2 more visits then start therapy for LB. Date: 8/21/2023              TX#: 5/9 Date: 8/28/2023   Tx#: 6/9 Date:9/8/2023   Tx#:7/9 Date:9/13/2023   Tx#:8/9 Date:9/18/2023   Tx#:9/9 Date:9/20/2023   Tx#:10/12   Nustep level 5, 5 min Nustep level 5, 5 min Nustep level 5x 5 min Nustep level 4, 5 min PN see above. 5 min nustep level 5   15x5\" knee flex stretch on step, 8\"  2x10 shuttle press for increased flex 62 lbs 6x10\" hold R knee flex stretch on 8\" step 15x5\"  knee flex stretch on reebok  2x10 hamstring curls with grey band 3x10 DL shuttle press, 62 lbs  15 Therex:  3x10 DL press, 56 lbs  15x R SL press, 50 lbs  2x10 TRX squats   2x10 R hamstring curls seated.  Green band   2x10 squats HHA  15x5\" TKE red band standing   15x anterior step down R LE 6\"  15x fwd lunge to bosu 10x split squat B with chair  2x lateral walk green spri bands c knee bend  15x TKE iso hold ball at wall 2x10 leg press with deep knee flexion 50 lbs  2x10 SL leg press R 50 lbs 15x flex AAROM with towel and strap  2x10 squats in p bars 2x10 R SL hip abd  10x SL squats TRX 2x lateral walks in p bars red sprix3   2x10 lunges to bosu, B, HHA   15x quad set with OP stretch strap and pressure into thigh on R, 5\" hold ManuaL;  Knee flexion with posterior tibial mob GR III followed by PROM flex; knee ext with posterior femur mob and OP into tibial ext, also sup patella mob with ext OP' lateral quad release x 15 min  10x knee flexion AAROM R ManuaL;  Knee flexion with posterior tibial mob GR III followed by PROM flex; knee ext with posterior femur mob and OP into tibial ext, also sup patella mob with ext OP' lateral quad release x 15 min ManuaL;  Knee flexion with posterior tibial mob GR III followed by PROM flex; knee ext with posterior femur mob and OP into tibial ext, also sup patella mob with ext OP' lateral quad release x 15 min  L gentle distraction mob ManuaL;  Knee flexion with posterior tibial mob GR III followed by PROM flex; knee ext with posterior femur mob and OP into tibial ext, also sup patella mob with ext OP' lateral quad release x 15 min  L gentle distraction mob Manual:  Knee flexion with posterior tibial mob GR III followed by PROM flex; knee ext with posterior femur mob and OP into tibial ext, also sup patella mob with ext OP' lateral quad release x 10 min    - -15x squats 10x lunges to airex pad.  B - 10x 5\" DKTC red SB  10x bridges 5\"   HEP: see patient instruction   HEP 9/18/2023: 2x10 SL abd, 15x SL squats, 5 min prone hangs with 2 lb weights    Charges: 2 te 1 MT        Total Timed Treatment: 46 min  Total Treatment Time: 46 min

## 2023-09-27 ENCOUNTER — LAB ENCOUNTER (OUTPATIENT)
Dept: LAB | Age: 58
End: 2023-09-27
Attending: INTERNAL MEDICINE
Payer: MEDICAID

## 2023-09-27 ENCOUNTER — HOSPITAL ENCOUNTER (OUTPATIENT)
Age: 58
Discharge: HOME | End: 2023-09-27
Attending: EMERGENCY MEDICINE
Payer: MEDICAID

## 2023-09-27 ENCOUNTER — APPOINTMENT (OUTPATIENT)
Dept: CT IMAGING | Age: 58
End: 2023-09-27
Attending: EMERGENCY MEDICINE
Payer: MEDICAID

## 2023-09-27 VITALS
RESPIRATION RATE: 18 BRPM | HEIGHT: 67 IN | BODY MASS INDEX: 29.35 KG/M2 | WEIGHT: 187 LBS | HEART RATE: 60 BPM | OXYGEN SATURATION: 100 % | TEMPERATURE: 99 F | DIASTOLIC BLOOD PRESSURE: 73 MMHG | SYSTOLIC BLOOD PRESSURE: 115 MMHG

## 2023-09-27 DIAGNOSIS — Z13.228 SCREENING FOR METABOLIC DISORDER: ICD-10-CM

## 2023-09-27 DIAGNOSIS — E55.9 VITAMIN D DEFICIENCY: ICD-10-CM

## 2023-09-27 DIAGNOSIS — Z13.220 SCREENING FOR LIPID DISORDERS: ICD-10-CM

## 2023-09-27 DIAGNOSIS — Z13.0 SCREENING FOR DISORDER OF BLOOD AND BLOOD-FORMING ORGANS: ICD-10-CM

## 2023-09-27 DIAGNOSIS — K59.00 CONSTIPATION, UNSPECIFIED CONSTIPATION TYPE: ICD-10-CM

## 2023-09-27 DIAGNOSIS — R10.9 ABDOMINAL PAIN, RIGHT LATERAL: Primary | ICD-10-CM

## 2023-09-27 DIAGNOSIS — Z13.29 SCREENING FOR THYROID DISORDER: ICD-10-CM

## 2023-09-27 DIAGNOSIS — Z00.00 ROUTINE GENERAL MEDICAL EXAMINATION AT A HEALTH CARE FACILITY: ICD-10-CM

## 2023-09-27 LAB
#MXD IC: 0.6 X10ˆ3/UL (ref 0.1–1)
ALBUMIN SERPL-MCNC: 3.9 G/DL (ref 3.4–5)
ALBUMIN SERPL-MCNC: 4.2 G/DL (ref 3.4–5)
ALBUMIN/GLOB SERPL: 1 {RATIO} (ref 1–2)
ALP LIVER SERPL-CCNC: 91 U/L
ALP LIVER SERPL-CCNC: 98 U/L
ALT SERPL-CCNC: 21 U/L
ALT SERPL-CCNC: 25 U/L
ANION GAP SERPL CALC-SCNC: 7 MMOL/L (ref 0–18)
AST SERPL-CCNC: 10 U/L (ref 15–37)
AST SERPL-CCNC: 9 U/L (ref 15–37)
BASOPHILS # BLD AUTO: 0.03 X10(3) UL (ref 0–0.2)
BASOPHILS NFR BLD AUTO: 0.3 %
BILIRUB DIRECT SERPL-MCNC: 0.1 MG/DL (ref 0–0.2)
BILIRUB SERPL-MCNC: 0.7 MG/DL (ref 0.1–2)
BILIRUB SERPL-MCNC: 0.7 MG/DL (ref 0.1–2)
BUN BLD-MCNC: 16 MG/DL (ref 7–18)
BUN BLD-MCNC: 16 MG/DL (ref 7–18)
CALCIUM BLD-MCNC: 9.6 MG/DL (ref 8.5–10.1)
CHLORIDE BLD-SCNC: 100 MMOL/L (ref 98–112)
CHLORIDE SERPL-SCNC: 104 MMOL/L (ref 98–112)
CHOLEST SERPL-MCNC: 208 MG/DL (ref ?–200)
CO2 BLD-SCNC: 30 MMOL/L (ref 21–32)
CO2 SERPL-SCNC: 28 MMOL/L (ref 21–32)
CREAT BLD-MCNC: 0.9 MG/DL
CREAT BLD-MCNC: 1.17 MG/DL
EGFRCR SERPLBLD CKD-EPI 2021: 53 ML/MIN/1.73M2 (ref 60–?)
EGFRCR SERPLBLD CKD-EPI 2021: 73 ML/MIN/1.73M2 (ref 60–?)
EOSINOPHIL # BLD AUTO: 0.25 X10(3) UL (ref 0–0.7)
EOSINOPHIL NFR BLD AUTO: 2.2 %
ERYTHROCYTE [DISTWIDTH] IN BLOOD BY AUTOMATED COUNT: 13.2 %
FASTING PATIENT LIPID ANSWER: YES
FASTING STATUS PATIENT QL REPORTED: YES
GLOBULIN PLAS-MCNC: 4 G/DL (ref 2.8–4.4)
GLUCOSE BLD-MCNC: 112 MG/DL (ref 70–99)
GLUCOSE BLD-MCNC: 121 MG/DL (ref 70–99)
HCT VFR BLD AUTO: 40.8 %
HCT VFR BLD AUTO: 42.4 %
HCT VFR BLD CALC: 42 %
HDLC SERPL-MCNC: 48 MG/DL (ref 40–59)
HGB BLD-MCNC: 12.6 G/DL
HGB BLD-MCNC: 13 G/DL
IMM GRANULOCYTES # BLD AUTO: 0.04 X10(3) UL (ref 0–1)
IMM GRANULOCYTES NFR BLD: 0.4 %
ISTAT IONIZED CALCIUM FOR CHEM 8: 1.24 MMOL/L (ref 1.12–1.32)
LDLC SERPL CALC-MCNC: 100 MG/DL (ref ?–100)
LIPASE SERPL-CCNC: 32 U/L (ref 13–75)
LYMPHOCYTES # BLD AUTO: 3.7 X10(3) UL (ref 1–4)
LYMPHOCYTES # BLD AUTO: 4.1 X10ˆ3/UL (ref 1–4)
LYMPHOCYTES NFR BLD AUTO: 32.8 %
LYMPHOCYTES NFR BLD AUTO: 37.2 %
MCH RBC QN AUTO: 26.7 PG (ref 26–34)
MCH RBC QN AUTO: 26.9 PG (ref 26–34)
MCHC RBC AUTO-ENTMCNC: 30.7 G/DL (ref 31–37)
MCHC RBC AUTO-ENTMCNC: 30.9 G/DL (ref 31–37)
MCV RBC AUTO: 87 FL
MCV RBC AUTO: 87.2 FL (ref 80–100)
MIXED CELL %: 5 %
MONOCYTES # BLD AUTO: 0.66 X10(3) UL (ref 0.1–1)
MONOCYTES NFR BLD AUTO: 5.8 %
NEUTROPHILS # BLD AUTO: 6.4 X10ˆ3/UL (ref 1.5–7.7)
NEUTROPHILS # BLD AUTO: 6.61 X10 (3) UL (ref 1.5–7.7)
NEUTROPHILS # BLD AUTO: 6.61 X10(3) UL (ref 1.5–7.7)
NEUTROPHILS NFR BLD AUTO: 57.8 %
NEUTROPHILS NFR BLD AUTO: 58.5 %
NONHDLC SERPL-MCNC: 160 MG/DL (ref ?–130)
OSMOLALITY SERPL CALC.SUM OF ELEC: 290 MOSM/KG (ref 275–295)
PLATELET # BLD AUTO: 417 X10ˆ3/UL (ref 150–450)
PLATELET # BLD AUTO: 419 10(3)UL (ref 150–450)
POTASSIUM BLD-SCNC: 3.5 MMOL/L (ref 3.6–5.1)
POTASSIUM SERPL-SCNC: 4.2 MMOL/L (ref 3.5–5.1)
PROT SERPL-MCNC: 7.9 G/DL (ref 6.4–8.2)
PROT SERPL-MCNC: 8.5 G/DL (ref 6.4–8.2)
RBC # BLD AUTO: 4.69 X10(6)UL
RBC # BLD AUTO: 4.86 X10ˆ6/UL
SODIUM BLD-SCNC: 139 MMOL/L (ref 136–145)
SODIUM SERPL-SCNC: 139 MMOL/L (ref 136–145)
TRIGL SERPL-MCNC: 359 MG/DL (ref 30–149)
TSI SER-ACNC: 2.76 MIU/ML (ref 0.36–3.74)
VIT D+METAB SERPL-MCNC: 50.6 NG/ML (ref 30–100)
VLDLC SERPL CALC-MCNC: 61 MG/DL (ref 0–30)
WBC # BLD AUTO: 11.1 X10ˆ3/UL (ref 4–11)
WBC # BLD AUTO: 11.3 X10(3) UL (ref 4–11)

## 2023-09-27 PROCEDURE — 80061 LIPID PANEL: CPT

## 2023-09-27 PROCEDURE — 80047 BASIC METABLC PNL IONIZED CA: CPT

## 2023-09-27 PROCEDURE — 74177 CT ABD & PELVIS W/CONTRAST: CPT | Performed by: EMERGENCY MEDICINE

## 2023-09-27 PROCEDURE — 85025 COMPLETE CBC W/AUTO DIFF WBC: CPT | Performed by: EMERGENCY MEDICINE

## 2023-09-27 PROCEDURE — 83690 ASSAY OF LIPASE: CPT | Performed by: EMERGENCY MEDICINE

## 2023-09-27 PROCEDURE — 84443 ASSAY THYROID STIM HORMONE: CPT

## 2023-09-27 PROCEDURE — 82306 VITAMIN D 25 HYDROXY: CPT

## 2023-09-27 PROCEDURE — 80053 COMPREHEN METABOLIC PANEL: CPT | Performed by: EMERGENCY MEDICINE

## 2023-09-27 PROCEDURE — 80076 HEPATIC FUNCTION PANEL: CPT | Performed by: EMERGENCY MEDICINE

## 2023-09-27 PROCEDURE — 82248 BILIRUBIN DIRECT: CPT | Performed by: EMERGENCY MEDICINE

## 2023-09-27 PROCEDURE — 36415 COLL VENOUS BLD VENIPUNCTURE: CPT

## 2023-09-27 NOTE — ED INITIAL ASSESSMENT (HPI)
Pt states having generalized abd pain for 2 days. C/o intermittent shart pains. Last bm was 4 days ago, feeling constipated. Took miralax last night. Feels bloated. Denies n/v. Pt was in hosp in July for diverticulitis. Denies urinary symptoms.    Had a colonoscopy a couple weeks ago

## 2023-09-27 NOTE — DISCHARGE INSTRUCTIONS
Do not eat any foods containing nuts, seeds, or any small object that can get stuck in your diverticuli  Drink lots of water. Eat lots of high-fiber foods - vegetables, fruits, whole grains. Avoid sugars and starches  Avoid processed foods  Take MiraLAX every day. Use an over-the-counter laxative in addition, like milk of magnesia liquid or tablets, senna coat, or Dulcolax. Try fleets enemas today. Keep walking and moving, and make sure you have a bowel movement within the next day. The liver function and pancreatic enzyme testing will deliver later today or tomorrow and results will show in your MyChart.   Return for any problems

## 2023-10-04 ENCOUNTER — OFFICE VISIT (OUTPATIENT)
Dept: INTERNAL MEDICINE CLINIC | Facility: CLINIC | Age: 58
End: 2023-10-04
Payer: MEDICAID

## 2023-10-04 ENCOUNTER — OFFICE VISIT (OUTPATIENT)
Dept: ORTHOPEDICS CLINIC | Facility: CLINIC | Age: 58
End: 2023-10-04
Payer: MEDICAID

## 2023-10-04 ENCOUNTER — OFFICE VISIT (OUTPATIENT)
Dept: PHYSICAL THERAPY | Facility: HOSPITAL | Age: 58
End: 2023-10-04
Attending: INTERNAL MEDICINE
Payer: MEDICAID

## 2023-10-04 VITALS
HEIGHT: 67 IN | SYSTOLIC BLOOD PRESSURE: 104 MMHG | TEMPERATURE: 98 F | DIASTOLIC BLOOD PRESSURE: 82 MMHG | HEART RATE: 84 BPM | BODY MASS INDEX: 28.09 KG/M2 | WEIGHT: 179 LBS

## 2023-10-04 VITALS — WEIGHT: 187 LBS | HEIGHT: 67 IN | BODY MASS INDEX: 29.35 KG/M2

## 2023-10-04 DIAGNOSIS — Z00.00 WELLNESS EXAMINATION: Primary | ICD-10-CM

## 2023-10-04 DIAGNOSIS — E78.00 HYPERCHOLESTEREMIA: ICD-10-CM

## 2023-10-04 DIAGNOSIS — J30.2 SEASONAL ALLERGIES: ICD-10-CM

## 2023-10-04 DIAGNOSIS — Z87.19 HISTORY OF DIVERTICULITIS: ICD-10-CM

## 2023-10-04 DIAGNOSIS — Z96.651 STATUS POST RIGHT KNEE REPLACEMENT: ICD-10-CM

## 2023-10-04 DIAGNOSIS — R07.89 OTHER CHEST PAIN: ICD-10-CM

## 2023-10-04 DIAGNOSIS — M17.12 PRIMARY OSTEOARTHRITIS OF LEFT KNEE: Primary | ICD-10-CM

## 2023-10-04 DIAGNOSIS — Z86.010 HISTORY OF COLON POLYPS: ICD-10-CM

## 2023-10-04 DIAGNOSIS — G47.00 INSOMNIA, UNSPECIFIED TYPE: ICD-10-CM

## 2023-10-04 DIAGNOSIS — I10 PRIMARY HYPERTENSION: ICD-10-CM

## 2023-10-04 DIAGNOSIS — E11.42 DIABETIC POLYNEUROPATHY ASSOCIATED WITH TYPE 2 DIABETES MELLITUS (HCC): ICD-10-CM

## 2023-10-04 DIAGNOSIS — E11.9 TYPE 2 DIABETES MELLITUS WITHOUT COMPLICATION, WITHOUT LONG-TERM CURRENT USE OF INSULIN (HCC): ICD-10-CM

## 2023-10-04 DIAGNOSIS — K21.9 GASTROESOPHAGEAL REFLUX DISEASE, UNSPECIFIED WHETHER ESOPHAGITIS PRESENT: ICD-10-CM

## 2023-10-04 DIAGNOSIS — M54.16 LUMBAR RADICULITIS: ICD-10-CM

## 2023-10-04 DIAGNOSIS — B07.9 VERRUCA: ICD-10-CM

## 2023-10-04 DIAGNOSIS — F41.9 ANXIETY: ICD-10-CM

## 2023-10-04 DIAGNOSIS — Z86.73 HISTORY OF CVA (CEREBROVASCULAR ACCIDENT): ICD-10-CM

## 2023-10-04 DIAGNOSIS — M15.9 PRIMARY OSTEOARTHRITIS INVOLVING MULTIPLE JOINTS: ICD-10-CM

## 2023-10-04 LAB
ATRIAL RATE: 68 BPM
CARTRIDGE LOT#: 611 NUMERIC
HEMOGLOBIN A1C: 7 % (ref 4.3–5.6)
Q-T INTERVAL: 428 MS
QRS DURATION: 86 MS
QTC CALCULATION (BEZET): 455 MS
R AXIS: 14 DEGREES
T AXIS: 38 DEGREES
VENTRICULAR RATE: 68 BPM

## 2023-10-04 PROCEDURE — 3074F SYST BP LT 130 MM HG: CPT | Performed by: INTERNAL MEDICINE

## 2023-10-04 PROCEDURE — 3008F BODY MASS INDEX DOCD: CPT | Performed by: PHYSICIAN ASSISTANT

## 2023-10-04 PROCEDURE — 99396 PREV VISIT EST AGE 40-64: CPT | Performed by: INTERNAL MEDICINE

## 2023-10-04 PROCEDURE — 97110 THERAPEUTIC EXERCISES: CPT

## 2023-10-04 PROCEDURE — 3051F HG A1C>EQUAL 7.0%<8.0%: CPT | Performed by: INTERNAL MEDICINE

## 2023-10-04 PROCEDURE — 97140 MANUAL THERAPY 1/> REGIONS: CPT

## 2023-10-04 PROCEDURE — 3079F DIAST BP 80-89 MM HG: CPT | Performed by: INTERNAL MEDICINE

## 2023-10-04 PROCEDURE — 20610 DRAIN/INJ JOINT/BURSA W/O US: CPT | Performed by: PHYSICIAN ASSISTANT

## 2023-10-04 PROCEDURE — 3008F BODY MASS INDEX DOCD: CPT | Performed by: INTERNAL MEDICINE

## 2023-10-04 PROCEDURE — 99213 OFFICE O/P EST LOW 20 MIN: CPT | Performed by: INTERNAL MEDICINE

## 2023-10-04 PROCEDURE — 83036 HEMOGLOBIN GLYCOSYLATED A1C: CPT | Performed by: INTERNAL MEDICINE

## 2023-10-04 PROCEDURE — 93000 ELECTROCARDIOGRAM COMPLETE: CPT | Performed by: INTERNAL MEDICINE

## 2023-10-04 RX ORDER — ATORVASTATIN CALCIUM 80 MG/1
80 TABLET, FILM COATED ORAL NIGHTLY
Qty: 90 TABLET | Refills: 0 | Status: SHIPPED | OUTPATIENT
Start: 2023-10-04

## 2023-10-04 RX ORDER — TRIAMCINOLONE ACETONIDE 40 MG/ML
40 INJECTION, SUSPENSION INTRA-ARTICULAR; INTRAMUSCULAR ONCE
Status: COMPLETED | OUTPATIENT
Start: 2023-10-04 | End: 2023-10-04

## 2023-10-04 RX ADMIN — TRIAMCINOLONE ACETONIDE 40 MG: 40 INJECTION, SUSPENSION INTRA-ARTICULAR; INTRAMUSCULAR at 09:49:00

## 2023-10-04 NOTE — PROGRESS NOTES
Diagnosis:    Chronic knee pain after total replacement of right knee joint (M25.561,G89.29,Z96. Referring Provider: Masood Reyna  Date of Evaluation:    8/9/23    Precautions:  None Next MD visit:   none scheduled  Date of Surgery: n/a   Insurance Primary/Secondary: BLUE CROSS MEDICAID / N/A     # Auth Visits: 12 updated per [facility]            Subjective: Maynor Terry states that her sciatica cont to hurt but has improved a bit. She reports her knee pain is not as bad as it was last time. She will have an injection in her low back following therapy, then follow up with her primary MD.    Pain: 5610 R knee, L thigh: 7/10      Objective:     Strength: (* denotes performed with pain)  Hip Knee Foot/Ankle   Flexion: R 5; L 5  Extension: NT  Abduction: R 4-; L 4+  ER: R 4+; L 5  IR: R 5; L 5 Flexion: R 5; L 5  Extension: R 5; L 5    DF: 5/5 B  PF:5/5 B         Accessory motion: limited with anterior posterior glides; patellar mobility limited      R Knee AROM:  3 -100 deg    (improved from 4-96)  R Knee PROM:  3 -106 deg   (from eval: 5 -100 deg )    L knee AROM: 0-113 deg      SLS: R: <5\", L: 5\" wobbly    Assessment:       Maynor Terry was able to perform split squats with good form and no pain nor compensations. Able to increase weight with leg press. Cont to demonstrate about the same (100 deg) flex following mobs, will likely do 1 more session and then do therapy for sciatica. Goals:    Pt will improve knee extension ROM to -5 deg to allow proper heel strike during gait and terminal knee extension in stance Progressing  Pt will improve knee AROM flexion to >100 degrees to improve ability to perform stairs properly. Progressing at 100 deg. Progressing at 100 deg. Pt will improve abd strength to 5/5 to ascend 1 flight of stairs reciprocally without UE assist. Progressing. Pt will increase hip and knee strength to grossly 4+/5 to be able to get up and down from the floor safely. Goal met.   Pt will improve SLS to 10s to improve safety with gait on uneven surfaces such as grass and gravel  Pt will be independent and compliant with comprehensive HEP to maintain progress achieved in PT. Goal met. Post LEFS Score  Post LEFS Score: 72.5 % (9/18/2023  3:45 PM)    72.5 % improvement    Plan: 2 more visits then start therapy for LB. Date: 8/21/2023              TX#: 5/9 Date: 8/28/2023   Tx#: 6/9 Date:9/8/2023   Tx#:7/9 Date:9/13/2023   Tx#:8/9 Date:9/18/2023   Tx#:9/9 Date:9/20/2023   Tx#:10/12 Date:10/4/2023   Tx#:11/12   Nustep level 5, 5 min Nustep level 5, 5 min Nustep level 5x 5 min Nustep level 4, 5 min PN see above. 5 min nustep level 5 5 min nustep level 5   15x5\" knee flex stretch on step, 8\"  2x10 shuttle press for increased flex 62 lbs 6x10\" hold R knee flex stretch on 8\" step 15x5\"  knee flex stretch on reebok  2x10 hamstring curls with grey band 3x10 DL shuttle press, 62 lbs  15 Therex:  3x10 DL press, 56 lbs  15x R SL press, 50 lbs  2x10 TRX squats   2x10 R hamstring curls seated.  Green band 2x12 DL shuttle press, 62 lbs  2x10 SL press, 50 lbs   2x10 squats HHA  15x5\" TKE red band standing   15x anterior step down R LE 6\"  15x fwd lunge to bosu 10x split squat B with chair  2x lateral walk green spri bands c knee bend  15x TKE iso hold ball at wall 2x10 leg press with deep knee flexion 50 lbs  2x10 SL leg press R 50 lbs 15x flex AAROM with towel and strap  2x10 squats in p bars 2x10 R SL hip abd  10x SL squats TRX 2x lateral walks in p bars red sprix3   2x10 lunges to bosu, B, HHA 10 x split squat (using chair) HHA p bars, B  12x5\" B knee flex stretch on 8\" step  15x TKE grey band    15x quad set with OP stretch strap and pressure into thigh on R, 5\" hold ManuaL;  Knee flexion with posterior tibial mob GR III followed by PROM flex; knee ext with posterior femur mob and OP into tibial ext, also sup patella mob with ext OP' lateral quad release x 15 min  10x knee flexion AAROM R ManuaL;  Knee flexion with posterior tibial mob GR III followed by PROM flex; knee ext with posterior femur mob and OP into tibial ext, also sup patella mob with ext OP' lateral quad release x 15 min ManuaL;  Knee flexion with posterior tibial mob GR III followed by PROM flex; knee ext with posterior femur mob and OP into tibial ext, also sup patella mob with ext OP' lateral quad release x 15 min  L gentle distraction mob ManuaL;  Knee flexion with posterior tibial mob GR III followed by PROM flex; knee ext with posterior femur mob and OP into tibial ext, also sup patella mob with ext OP' lateral quad release x 15 min  L gentle distraction mob Manual:  Knee flexion with posterior tibial mob GR III followed by PROM flex; knee ext with posterior femur mob and OP into tibial ext, also sup patella mob with ext OP' lateral quad release x 10 min Manual:  Knee flexion with posterior tibial mob GR III followed by PROM flex; knee ext with posterior femur mob and OP into tibial ext, also sup patella mob with ext OP' lateral quad release; performed a bit on L too x 10 min    - -15x squats 10x lunges to airex pad.  B - 10x 5\" DKTC red SB  10x bridges 5\" -   HEP: see patient instruction   HEP 9/18/2023: 2x10 SL abd, 15x SL squats, 5 min prone hangs with 2 lb weights    Charges: 2 te 1 MT        Total Timed Treatment: 46 min  Total Treatment Time: 46 min

## 2023-10-04 NOTE — PROCEDURES
After informed consent, the patient's left knee was marked, locally anesthetized with skin refrigerant, prepped with topical antiseptic, and injected with a mixture of 1mL 40mg/mL Kenalog, 2mL 1% lidocaine and 2mL 0.5% marcaine through the inferolateral portal.  A band-aid was applied. The patient tolerated the procedure well.     Shiva Gonzalez PA-C  [address] [facility] [place],[address] Orthopedic Surgery

## 2023-10-09 ENCOUNTER — TELEPHONE (OUTPATIENT)
Dept: INTERNAL MEDICINE CLINIC | Facility: CLINIC | Age: 58
End: 2023-10-09

## 2023-10-09 DIAGNOSIS — Z23 NEED FOR PNEUMOCOCCAL VACCINATION: Primary | ICD-10-CM

## 2023-10-09 NOTE — TELEPHONE ENCOUNTER
Future Appointments   Date Time Provider Gabe Middleton   10/20/2023  1:00 PM [facility]     Please place orders for Pneumonia shot.

## 2023-10-11 ENCOUNTER — TELEPHONE (OUTPATIENT)
Dept: INTERNAL MEDICINE CLINIC | Facility: CLINIC | Age: 58
End: 2023-10-11

## 2023-10-15 DIAGNOSIS — G47.00 INSOMNIA, UNSPECIFIED TYPE: ICD-10-CM

## 2023-10-16 ENCOUNTER — OFFICE VISIT (OUTPATIENT)
Dept: PHYSICAL THERAPY | Facility: HOSPITAL | Age: 58
End: 2023-10-16
Attending: INTERNAL MEDICINE
Payer: MEDICAID

## 2023-10-16 DIAGNOSIS — Z86.73 HISTORY OF CVA (CEREBROVASCULAR ACCIDENT): ICD-10-CM

## 2023-10-16 DIAGNOSIS — E11.9 TYPE 2 DIABETES MELLITUS WITHOUT COMPLICATION, WITHOUT LONG-TERM CURRENT USE OF INSULIN (HCC): ICD-10-CM

## 2023-10-16 DIAGNOSIS — E78.00 HYPERCHOLESTEREMIA: ICD-10-CM

## 2023-10-16 PROCEDURE — 97140 MANUAL THERAPY 1/> REGIONS: CPT

## 2023-10-16 PROCEDURE — 97110 THERAPEUTIC EXERCISES: CPT

## 2023-10-16 NOTE — PROGRESS NOTES
Diagnosis:    Chronic knee pain after total replacement of right knee joint (M25.561,G89.29,Z96. Referring Provider: Selena Loyd  Date of Evaluation:    8/9/23    Precautions:  None Next MD visit:   none scheduled  Date of Surgery: n/a   Insurance Primary/Secondary: BLUE CROSS MEDICAID / N/A     # Auth Visits: 12 updated per [facility]           Progress Summary  Pt has attended 12 visits in Physical Therapy. Subjective: Amina Mariscal states that she is doing alright, she did a lot of walking last Friday at a festival. She states it feels better today. She gets cramps in the L leg, she has to get up and walk it off. Pain: 5610 R knee, L thigh: 7/10      Objective:     Strength: (* denotes performed with pain)  Hip Knee Foot/Ankle   Flexion: R 5; L 5  Extension: NT  Abduction: R 4-\; L 4+  ER: R 5; L 5  IR: R 5; L 5 Flexion: R 5; L 5  Extension: R 5; L 5    DF: 5/5 B  PF:5/5 B         Accessory motion: limited with anterior posterior glides; patellar mobility limited      R Knee AROM:  3 -100 deg    (improved from 4-96)  R Knee PROM:  3 -104 deg   (from eval: 5 -100 deg )    L knee AROM: 0-113 deg      SLS: R: <5\", L: 5\" wobbly    Assessment:       Sandra is limited in AROM and PROM flexion/ext  on the R due to capsular tightness. She has improved ER hip strength and made progress towards LTG. At this time she will DC with HEP. Will start again for lumbar spine next week. Goals:    Pt will improve knee extension ROM to -5 deg to allow proper heel strike during gait and terminal knee extension in stance Progressing  Pt will improve knee AROM flexion to >100 degrees to improve ability to perform stairs properly. Progressing at 100 deg. Progressing at 100 deg. Pt will improve abd strength to 5/5 to ascend 1 flight of stairs reciprocally without UE assist. Progressing. Pt will increase hip and knee strength to grossly 4+/5 to be able to get up and down from the floor safely. Goal met.   Pt will improve SLS to 10s to improve safety with gait on uneven surfaces such as grass and gravel  Pt will be independent and compliant with comprehensive HEP to maintain progress achieved in PT. Goal met. Post LEFS Score  Post LEFS Score: 72.5 % (9/18/2023  3:45 PM)    72.5 % improvement      Plan: DC with HEP, will start lumbar therapy next. Patient/Family/Caregiver was advised of these findings, precautions, and treatment options and has agreed to actively participate in planning and for this course of care. Thank you for your referral. If you have any questions, please contact me at Dept: [phone]. Sincerely,  Electronically signed by therapist: Abel Baptiste PT , DPT    Physician's certification required:  Yes  Please co-sign or sign and return this letter via fax as soon as possible to [phone]. I certify the need for these services furnished under this plan of treatment and while under my care. X___________________________________________________ Date____________________    Certification From: 38/29/7197  To:1/14/2024    Date: 8/21/2023              TX#: 5/9 Date: 8/28/2023   Tx#: 6/9 Date:9/8/2023   Tx#:7/9 Date:9/13/2023   Tx#:8/9 Date:9/18/2023   Tx#:9/9 Date:9/20/2023   Tx#:10/12 Date:10/4/2023   Tx#:11/12 Date:10/16/2023   Tx#:12/12   Nustep level 5, 5 min Nustep level 5, 5 min Nustep level 5x 5 min Nustep level 4, 5 min PN see above. 5 min nustep level 5 5 min nustep level 5 5 min recum level 5   15x5\" knee flex stretch on step, 8\"  2x10 shuttle press for increased flex 62 lbs 6x10\" hold R knee flex stretch on 8\" step 15x5\"  knee flex stretch on [employer]  2x10 hamstring curls with grey band 3x10 DL shuttle press, 62 lbs  15 Therex:  3x10 DL press, 56 lbs  15x R SL press, 50 lbs  2x10 TRX squats   2x10 R hamstring curls seated.  Green band 2x12 DL shuttle press, 62 lbs  2x10 SL press, 50 lbs 3x10 DL shuttle, 75 lbs  2x10 SL press 50 lbs   2x10 squats HHA  15x5\" TKE red band standing   15x anterior step down R LE 6\"  15x fwd lunge to bosu 10x split squat B with chair  2x lateral walk green spri bands c knee bend  15x TKE iso hold ball at wall 2x10 leg press with deep knee flexion 50 lbs  2x10 SL leg press R 50 lbs 15x flex AAROM with towel and strap  2x10 squats in p bars 2x10 R SL hip abd  10x SL squats TRX 2x lateral walks in p bars red sprix3   2x10 lunges to bosu, B, HHA 10 x split squat (using chair) HHA p bars, B  12x5\" B knee flex stretch on 8\" step  15x TKE grey band  15x split squat, B  2x10 hams curls green bands  HEP overview   15x quad set with OP stretch strap and pressure into thigh on R, 5\" hold ManuaL;  Knee flexion with posterior tibial mob GR III followed by PROM flex; knee ext with posterior femur mob and OP into tibial ext, also sup patella mob with ext OP' lateral quad release x 15 min  10x knee flexion AAROM R ManuaL;  Knee flexion with posterior tibial mob GR III followed by PROM flex; knee ext with posterior femur mob and OP into tibial ext, also sup patella mob with ext OP' lateral quad release x 15 min ManuaL;  Knee flexion with posterior tibial mob GR III followed by PROM flex; knee ext with posterior femur mob and OP into tibial ext, also sup patella mob with ext OP' lateral quad release x 15 min  L gentle distraction mob ManuaL;  Knee flexion with posterior tibial mob GR III followed by PROM flex; knee ext with posterior femur mob and OP into tibial ext, also sup patella mob with ext OP' lateral quad release x 15 min  L gentle distraction mob Manual:  Knee flexion with posterior tibial mob GR III followed by PROM flex; knee ext with posterior femur mob and OP into tibial ext, also sup patella mob with ext OP' lateral quad release x 10 min Manual:  Knee flexion with posterior tibial mob GR III followed by PROM flex; knee ext with posterior femur mob and OP into tibial ext, also sup patella mob with ext OP' lateral quad release; performed a bit on L too x 10 min Manual:  Knee flexion with posterior tibial mob GR III followed by PROM flex; knee ext with posterior femur mob and OP into tibial ext, also sup patella mob with ext OP' lateral quad release; performed a bit on L too x 10 min    - -15x squats 10x lunges to airex pad. B - 10x 5\" DKTC red SB  10x bridges 5\" - Attempted upright bike, able to do revolutions. x4 min   HEP: see patient instruction   HEP 9/18/2023: 2x10 SL abd, 15x SL squats, 5 min prone hangs with 2 lb weights    Charges: 2 te 1 MT        Total Timed Treatment: 46 min  Total Treatment Time: 46 min

## 2023-10-18 RX ORDER — ATORVASTATIN CALCIUM 80 MG/1
80 TABLET, FILM COATED ORAL NIGHTLY
Qty: 90 TABLET | Refills: 0 | OUTPATIENT
Start: 2023-10-18

## 2023-10-18 RX ORDER — ZOLPIDEM TARTRATE 12.5 MG/1
12.5 TABLET, FILM COATED, EXTENDED RELEASE ORAL DAILY
Qty: 30 TABLET | Refills: 0 | Status: SHIPPED | OUTPATIENT
Start: 2023-10-18

## 2023-10-18 NOTE — TELEPHONE ENCOUNTER
Last OV: 10/4/23    Future Appointments   Date Time Provider Gabe Middleton   10/20/2023 11:45 AM [facility] NURSE [facility]   10/25/2023  5:15 PM Sandy Zeng, PT [address] [facility]   11/1/2023  5:15 PM Sandy Zeng, PT [address] [facility]   11/8/2023  5:15 PM Sandy Zeng, PT [address] [facility]   11/13/2023  5:15 PM Sandy Zeng, PT [address] [facility]   11/22/2023  5:15 PM Sandy Zeng, PT [address] [facility]   11/27/2023  5:15 PM Sandy Zeng, PT [facility]        Latest labs: 10/4/23 A1c 9/27/23 Lipid, CMP, CBC, Vit D, TSH    Latest RX: ZOLPIDEM ER 12.5MG TABLETS 30 tabs 0 refills on 9/12/23    Per protocol, not on protocol. Rx pending.

## 2023-10-20 ENCOUNTER — TELEPHONE (OUTPATIENT)
Dept: INTERNAL MEDICINE CLINIC | Facility: CLINIC | Age: 58
End: 2023-10-20

## 2023-10-20 ENCOUNTER — NURSE ONLY (OUTPATIENT)
Dept: INTERNAL MEDICINE CLINIC | Facility: CLINIC | Age: 58
End: 2023-10-20
Payer: MEDICAID

## 2023-10-20 DIAGNOSIS — Z23 NEED FOR SHINGLES VACCINE: Primary | ICD-10-CM

## 2023-10-20 PROCEDURE — 90471 IMMUNIZATION ADMIN: CPT | Performed by: INTERNAL MEDICINE

## 2023-10-20 PROCEDURE — 90472 IMMUNIZATION ADMIN EACH ADD: CPT | Performed by: INTERNAL MEDICINE

## 2023-10-20 PROCEDURE — 90677 PCV20 VACCINE IM: CPT | Performed by: INTERNAL MEDICINE

## 2023-10-20 PROCEDURE — 90750 HZV VACC RECOMBINANT IM: CPT | Performed by: INTERNAL MEDICINE

## 2023-10-20 NOTE — TELEPHONE ENCOUNTER
PSR:  Please call pt to schedule a NV for #2 Shingrix in 2-6 months from 10/20/23.     Zoster Vaccine Recombinant Adjuvanted (Shingrix)10/20/2023

## 2023-10-25 ENCOUNTER — OFFICE VISIT (OUTPATIENT)
Dept: PHYSICAL THERAPY | Facility: HOSPITAL | Age: 58
End: 2023-10-25
Attending: INTERNAL MEDICINE

## 2023-10-25 ENCOUNTER — TELEPHONE (OUTPATIENT)
Dept: INTERNAL MEDICINE CLINIC | Facility: CLINIC | Age: 58
End: 2023-10-25

## 2023-10-25 ENCOUNTER — APPOINTMENT (OUTPATIENT)
Dept: PHYSICAL THERAPY | Facility: HOSPITAL | Age: 58
End: 2023-10-25
Attending: INTERNAL MEDICINE
Payer: MEDICAID

## 2023-10-25 DIAGNOSIS — E78.00 HYPERCHOLESTEREMIA: ICD-10-CM

## 2023-10-25 DIAGNOSIS — G47.00 INSOMNIA, UNSPECIFIED TYPE: ICD-10-CM

## 2023-10-25 DIAGNOSIS — M17.0 PRIMARY OSTEOARTHRITIS OF BOTH KNEES: ICD-10-CM

## 2023-10-25 DIAGNOSIS — Z86.73 HISTORY OF CVA (CEREBROVASCULAR ACCIDENT): ICD-10-CM

## 2023-10-25 DIAGNOSIS — Z96.651 STATUS POST RIGHT KNEE REPLACEMENT: Primary | ICD-10-CM

## 2023-10-25 DIAGNOSIS — E11.9 TYPE 2 DIABETES MELLITUS WITHOUT COMPLICATION, WITHOUT LONG-TERM CURRENT USE OF INSULIN (HCC): ICD-10-CM

## 2023-10-25 PROCEDURE — 97161 PT EVAL LOW COMPLEX 20 MIN: CPT

## 2023-10-25 PROCEDURE — 97110 THERAPEUTIC EXERCISES: CPT

## 2023-10-25 NOTE — TELEPHONE ENCOUNTER
Which pharmacy:[facility]    Prescription Refill Request - Patient advised can take 48-72 hours. Name of Medication (strength, dose, qty requested:     ZOLPIDEM TARTRATE ER 12.5 MG Oral Tab CR 30 tablet 0 10/18/2023     Sig - Route: TAKE 1 TABLET(12.5 MG) BY MOUTH DAILY - Oral      INSURANCE IS DENYING THIS MEDICATION--THEY WANT PATIENT TO TAKE --SOME ONE BUT TIME RELEASE? ?  SHE SAID SHE NEEDS A PA

## 2023-10-25 NOTE — TELEPHONE ENCOUNTER
Pt states Mary lost her script of below. She is asking for it to be re-sent to [place] on file. [id number] - [address] AT [facility] [address], [phone], [phone] [[id number]]          Disp Refills Start End    atorvastatin 80 MG Oral Tab 90 tablet 0 10/4/2023     Sig - Route: Take 1 tablet (80 mg total) by mouth nightly. - Oral    Sent to pharmacy as:  Atorvastatin Calcium 80 MG Oral Tablet (Lipitor)    E-Prescribing Status: Receipt confirmed by pharmacy (10/4/2023 10:26 AM CDT)

## 2023-10-26 RX ORDER — ATORVASTATIN CALCIUM 80 MG/1
80 TABLET, FILM COATED ORAL NIGHTLY
Qty: 90 TABLET | Refills: 0 | Status: SHIPPED | OUTPATIENT
Start: 2023-10-26

## 2023-10-27 RX ORDER — ZOLPIDEM TARTRATE 12.5 MG/1
12.5 TABLET, FILM COATED, EXTENDED RELEASE ORAL NIGHTLY PRN
Qty: 30 TABLET | Refills: 0 | Status: SHIPPED | OUTPATIENT
Start: 2023-10-27

## 2023-10-27 RX ORDER — ATORVASTATIN CALCIUM 80 MG/1
80 TABLET, FILM COATED ORAL NIGHTLY
Qty: 90 TABLET | Refills: 0 | OUTPATIENT
Start: 2023-10-27

## 2023-10-30 NOTE — LETTER
[facility] Pre-Admission Testing Department  Phone: [phone]  OUTSIDE TESTING RESULT REQUEST      TO:   Dr. Mariaelena Valle  Today's Date: 24    FAX #: [phone]     IMPORTANT: FOR YOUR IMMEDIATE ATTENTION  Please FAX all test results listed below to: [phone]     Testing already done on or about: 2024     * * * * If testing is NOT complete, arrange with patient A.S.A.P. * * * *      Patient Name: Sandra Roche  Surgery Date: 3/20/2024    CSN: [id number]  Medical Record: [id number]   : 1965 - A: 59 y      Sex: female  Surgeon(s):  Paulo Kearney MD  Procedure: ROBOTIC LOW ANTERIOR COLON RESECTION, POSSIBLE OSTOMY, POSSIBLE OPEN  Anesthesia Type: General     Surgeon: Paulo Kearney MD       The following Testing and Time Line are REQUIRED PER ANESTHESIA     EKG READ AND SIGNED WITHIN   90 days  CBC, Platelet; NO Differential within  30 days  BMP (requires 4 hour fast) within  90 days  MEDICAL CLEARANCE REQUEST FROM SURGEON    LAB ORDERS, APPT WAS MADE WITH SCREENING RN FOR 3/4/24.  ORDERS IN EPIC    Thank You,   Sent by: Divya YADAV RN      CONFIDENTIALITY NOTICE  This transmission is intended only for the use of the individual or entity to which it is addressed and may contain information that is privileged and confidential.  If the reader of this message is not the intended recipient, you are hereby notified that any disclosure, distribution, or copying of this information is strictly prohibited.  If you have received this transmission in error, please notify us immediately by telephone, and return the original documents to us at the address listed above.         PLEASE CONTINUE TAKING ALL PRESCRIPTION MEDICATIONS UP TO THE DAY OF SURGERY UNLESS OTHERWISE DIRECTED BELOW. DISCONTINUE all over the counter vitamins, supplements, and herbals 21 days prior to surgery. DISCONTINUE Non-Steroidal Anti-Inflammatory (NSAIDS) such as Advil, Ibuprofen, Motrin, Naproxen, and Aleve 5 days prior to surgery. *IT IS OK TO TAKE TYLENOL, Allergy medication, and indigestion medications*  *OK to continue iron supplement*    Prescription medications to HOLD      Hold Eliquis 72 hours (3 days) prior to surgery           CONTINUE all other medications like normal up until the day of surgery--TAKE ONLY [id number] [facility].. Amiodarone, Carvedilol            Comments   The day before surgery please take 2 Tylenol in the morning and then again before bed. You may use either regular or extra strength. Bring Dynahex soap and incentive spirometer back to the hospital.        Please do not bring home medications with you on the day of surgery unless otherwise directed by your nurse. If you are instructed to bring home medications, please give them to your nurse as they will be administered by the nursing staff. If you have any questions, please call [address] [place] [address] [phone]. A copy of this note was provided to the patient for reference.

## 2023-11-01 ENCOUNTER — OFFICE VISIT (OUTPATIENT)
Dept: PHYSICAL THERAPY | Facility: HOSPITAL | Age: 58
End: 2023-11-01
Attending: INTERNAL MEDICINE
Payer: MEDICAID

## 2023-11-01 PROCEDURE — 97110 THERAPEUTIC EXERCISES: CPT

## 2023-11-01 PROCEDURE — 97140 MANUAL THERAPY 1/> REGIONS: CPT

## 2023-11-01 NOTE — PROGRESS NOTES
Diagnosis:     Lumbar radiculitis (M54.16)  Lumbar spondylosis (T72.132)      Referring Provider: Gopi Mares  Date of Evaluation:    10/25/2023    Precautions:  None Next MD visit:   none scheduled  Date of Surgery: n/a   Insurance Primary/Secondary: BLUE CROSS MEDICAID / N/A     # Auth Visits: 8 per medicaid             Subjective: Randi Carpenter states her back is feeling alright, her knee hurts in the cold. Pain: 1/10      Objective:     From eval:   AROM: (* denotes performed with pain)  Flexion: 80 deg  Extension: 11 deg * (radic pain in L thigh)  Sidebending: R min limited*; L min limited* (all R sided lumbar pain)  Rotation: R mod limited; L mod limited  Assessment:  Good tolerance to manual therapy with improved mobility noted. She continues to respond well to exercise without pain. Goals:    (to be met in 6-8 visits)   Pt will improve transversus abdominis recruitment to perform proper isometric contraction without requiring verbal or tactile cuing to promote advancement of therex   Pt will demonstrate good understanding of proper posture and body mechanics to decrease pain and improve spinal safety   Pt will improve lumbar spine AROM SB and rot to min to nil limited deg to allow increase ease with bending forward to don shoes. Pt will report improved symptom centralization and absence of radicular symptoms for 3 consecutive days to improve function with ADL   Pt will have decreased paraspinal mm tension to tolerate standing >30 minutes for work and home activities   Pt will demonstrate improved core strength to be able to perform squats with <2/10 pain   Pt will be independent and compliant with comprehensive HEP to maintain progress achieved in PT         Plan: Progress tolerance to loaded positions. Date: 11/1/2023  TX#: 2/8 Date:                 TX#: 3/ Date:                 TX#: 4/ Date:                 TX#: 5/ Date:    Tx#: 6/   Therex:    15x L sciatic nerve glide in hooklying   15x lumbar rotation 2x10 dead bugs with TA brace   15x TA brace with pilates ring and head lift  15x SLR       Manual; good response to flex/rotate mob GR III B, QL release. GR III CPA to L3-L5 x 11 min. 2x10 pelvic tilts   15x multifidi ext over pillow       HEP:  2x10 marches c TA brace, 15x L sciatic nerve glide in hooklying, 2x10 lumbar rotation, 2x10 pelvic tilts. X10 min     Charges:  Therex:2, manual:1       Total Timed Treatment: 45 min  Total Treatment Time: 45 min

## 2023-11-08 ENCOUNTER — OFFICE VISIT (OUTPATIENT)
Dept: PHYSICAL THERAPY | Facility: HOSPITAL | Age: 58
End: 2023-11-08
Attending: INTERNAL MEDICINE
Payer: MEDICAID

## 2023-11-08 PROCEDURE — 97110 THERAPEUTIC EXERCISES: CPT

## 2023-11-08 PROCEDURE — 97140 MANUAL THERAPY 1/> REGIONS: CPT

## 2023-11-08 NOTE — PROGRESS NOTES
Diagnosis:     Lumbar radiculitis (M54.16)  Lumbar spondylosis (U14.620)      Referring Provider: Pat Richardson  Date of Evaluation:    10/25/2023    Precautions:  None Next MD visit:   none scheduled  Date of Surgery: n/a   Insurance Primary/Secondary: BLUE CROSS MEDICAID / N/A     # Auth Visits: 8 per medicaid             Subjective: Betty Ny reports her back was sore after last session, but she feels better after using a hot pack. Her knee is sore due to the rain. Pain: 0/10 LBP currently       Objective:     From eval:   AROM: (* denotes performed with pain)  Flexion: 80 deg  Extension: 11 deg * (radic pain in L thigh)  Sidebending: R min limited*; L min limited* (all R sided lumbar pain)  Rotation: R mod limited; L mod limited    Assessment:  Avoided any extension based exercises and prone Pas in case this flared her up last time. Focused on more neutral posture with a more flexion bias. Progressed her to chops in standing and rotational press, she tolerated these loaded progressions well without any increased pain. Goals:    (to be met in 6-8 visits)   Pt will improve transversus abdominis recruitment to perform proper isometric contraction without requiring verbal or tactile cuing to promote advancement of therex   Pt will demonstrate good understanding of proper posture and body mechanics to decrease pain and improve spinal safety   Pt will improve lumbar spine AROM SB and rot to min to nil limited deg to allow increase ease with bending forward to don shoes.    Pt will report improved symptom centralization and absence of radicular symptoms for 3 consecutive days to improve function with ADL   Pt will have decreased paraspinal mm tension to tolerate standing >30 minutes for work and home activities   Pt will demonstrate improved core strength to be able to perform squats with <2/10 pain   Pt will be independent and compliant with comprehensive HEP to maintain progress achieved in PT         Plan: Progress tolerance to loaded positions. Date: 11/1/2023  TX#: 2/8 Date:  11/8/2023                TX#: 3/8 Date:                 TX#: 4/ Date:                 TX#: 5/ Date: Tx#: 6/   Therex:    15x L sciatic nerve glide in hooklying   15x lumbar rotation Therex:  15x TA brace with pilate's ring      2x10 dead bugs with TA brace   15x TA brace with pilates ring and head lift  15x SLR 15x SLR with TA brace, B  15x rotational press,  15 lb B      Manual; good response to flex/rotate mob GR III B, QL release. GR III CPA to L3-L5 x 11 min. Manual; good response to flex/rotate mob GR III B, QL release. GR III x8 min      2x10 pelvic tilts   15x multifidi ext over pillow 2x10 squats   15x chops, B 15 lbs  5 min upright bike at 2 resistance, height at level 3      HEP:  2x10 marches c TA brace, 15x L sciatic nerve glide in hooklying, 2x10 lumbar rotation, 2x10 pelvic tilts. X10 min     Charges:  Therex:2, manual:1       Total Timed Treatment: 45 min  Total Treatment Time: 45 min

## 2023-11-13 ENCOUNTER — APPOINTMENT (OUTPATIENT)
Dept: PHYSICAL THERAPY | Facility: HOSPITAL | Age: 58
End: 2023-11-13
Attending: INTERNAL MEDICINE
Payer: MEDICAID

## 2023-11-13 PROCEDURE — 97140 MANUAL THERAPY 1/> REGIONS: CPT

## 2023-11-13 PROCEDURE — 97110 THERAPEUTIC EXERCISES: CPT

## 2023-11-13 NOTE — PROGRESS NOTES
Diagnosis:     Lumbar radiculitis (M54.16)  Lumbar spondylosis (T60.166)      Referring Provider: Zen Latham  Date of Evaluation:    10/25/2023    Precautions:  None Next MD visit:   none scheduled  Date of Surgery: n/a   Insurance Primary/Secondary: BLUE CROSS MEDICAID / N/A     # Auth Visits: 8 per medicaid             Subjective: Marily Castillo reports that she is feeling better, still using heating pad. Pain: 0/10 LBP currently       Objective:     From eval:   AROM: (* denotes performed with pain)  Flexion: 84 deg  Extension: 11 deg * (radic pain in L thigh)  Sidebending: R min limited*; L min limited* (all R sided lumbar pain)  Rotation: R mod limited; L mod limited    Assessment: Cont to progress well towards LTG. She no longer has radicular pain, just generalized LBP that is not intense in nature. Less verbal cues required for exercises. She is able to tolerate all exercises well. Goals:    (to be met in 6-8 visits)   Pt will improve transversus abdominis recruitment to perform proper isometric contraction without requiring verbal or tactile cuing to promote advancement of therex Progressing. Pt will demonstrate good understanding of proper posture and body mechanics to decrease pain and improve spinal safety   Pt will improve lumbar spine AROM SB and rot to min to nil limited deg to allow increase ease with bending forward to don shoes. Pt will report improved symptom centralization and absence of radicular symptoms for 3 consecutive days to improve function with ADL   Pt will have decreased paraspinal mm tension to tolerate standing >30 minutes for work and home activities   Pt will demonstrate improved core strength to be able to perform squats with <2/10 pain   Pt will be independent and compliant with comprehensive HEP to maintain progress achieved in PT  Progressing. Plan: Progress tolerance to loaded positions.   Date: 11/1/2023  TX#: 2/8 Date:  11/8/2023                TX#: 3/8 Date:  11/13/2023 TX#: 4/8 Date:                 TX#: 5/ Date: Tx#: 6/   Therex:    15x L sciatic nerve glide in hooklying   15x lumbar rotation Therex:  15x TA brace with pilate's ring Walking Ta brace crunch with pilates ring in p bars     2x10 dead bugs with TA brace   15x TA brace with pilates ring and head lift  15x SLR 15x SLR with TA brace, B  15x rotational press,  15 lb B 12x lunges with rotation and TA  10x lumbar flex seated, 5:\" hold     Manual; good response to flex/rotate mob GR III B, QL release. GR III CPA to L3-L5 x 11 min. Manual; good response to flex/rotate mob GR III B, QL release. GR III x8 min 10x pallof press, 20 lbs B  2x10 rows, 25 lbs  15 x dead bugs c TA brace     2x10 pelvic tilts   15x multifidi ext over pillow 2x10 squats   15x chops, B 15 lbs  5 min upright bike at 2 resistance, height at level 3 12x oblique mini crunch, B (a little pain to the R)       Manual; good response to flex/rotate mob GR III B, QL release. GR III x8 min    5 min upright bike resistance 2     HEP:  2x10 marches c TA brace, 15x L sciatic nerve glide in hooklying, 2x10 lumbar rotation, 2x10 pelvic tilts. X10 min     Charges:  Therex:2, manual:1       Total Timed Treatment: 45 min  Total Treatment Time: 45 min

## 2023-11-22 ENCOUNTER — APPOINTMENT (OUTPATIENT)
Dept: PHYSICAL THERAPY | Facility: HOSPITAL | Age: 58
End: 2023-11-22
Attending: INTERNAL MEDICINE
Payer: MEDICAID

## 2023-11-27 ENCOUNTER — OFFICE VISIT (OUTPATIENT)
Dept: PHYSICAL THERAPY | Facility: HOSPITAL | Age: 58
End: 2023-11-27
Attending: INTERNAL MEDICINE
Payer: MEDICAID

## 2023-11-27 ENCOUNTER — TELEPHONE (OUTPATIENT)
Dept: INTERNAL MEDICINE CLINIC | Facility: CLINIC | Age: 58
End: 2023-11-27

## 2023-11-27 DIAGNOSIS — K21.9 GASTROESOPHAGEAL REFLUX DISEASE, UNSPECIFIED WHETHER ESOPHAGITIS PRESENT: ICD-10-CM

## 2023-11-27 PROCEDURE — 97140 MANUAL THERAPY 1/> REGIONS: CPT

## 2023-11-27 PROCEDURE — 97110 THERAPEUTIC EXERCISES: CPT

## 2023-11-27 NOTE — PROGRESS NOTES
Diagnosis:     Lumbar radiculitis (M54.16)  Lumbar spondylosis (B56.746)      Referring Provider: Paola Samuel  Date of Evaluation:    10/25/2023    Precautions:  None Next MD visit:   none scheduled  Date of Surgery: n/a   Insurance Primary/Secondary: BLUE CROSS MEDICAID / N/A     # Auth Visits: 8 per medicaid             Subjective: Isael Has is having sinus drainage today. She feels a bit more achy as she does not feel well. Pain: 0/10 LBP currently       Objective:     From eval:   AROM: (* denotes performed with pain)  Flexion: 84 deg  Extension: 11 deg * (radic pain in L thigh)  Sidebending: R min limited*; L min limited* (all R sided lumbar pain)  Rotation: R mod limited; L mod limited    Assessment: Performed all mat work as she was not feeling her best today. She tolerated all exercises well without increased pain. Continues to be a but restricted with rotational mobs. Goals:    (to be met in 6-8 visits)   Pt will improve transversus abdominis recruitment to perform proper isometric contraction without requiring verbal or tactile cuing to promote advancement of therex Progressing. Pt will demonstrate good understanding of proper posture and body mechanics to decrease pain and improve spinal safety   Pt will improve lumbar spine AROM SB and rot to min to nil limited deg to allow increase ease with bending forward to don shoes. Pt will report improved symptom centralization and absence of radicular symptoms for 3 consecutive days to improve function with ADL   Pt will have decreased paraspinal mm tension to tolerate standing >30 minutes for work and home activities   Pt will demonstrate improved core strength to be able to perform squats with <2/10 pain   Pt will be independent and compliant with comprehensive HEP to maintain progress achieved in PT  Progressing. Plan: Progress tolerance to loaded positions.   Date: 11/1/2023  TX#: 2/8 Date:  11/8/2023                TX#: 3/8 Date:  11/13/2023 TX#: 4/8 Date:  11/27/2023                TX#: 5/8 Date: Tx#: 6/   Therex:    15x L sciatic nerve glide in hooklying   15x lumbar rotation Therex:  15x TA brace with pilate's ring Walking Ta brace crunch with pilates ring in p bars 2x30\" L piriformis stretch supine  15x pelvic tilts in supine    2x10 dead bugs with TA brace   15x TA brace with pilates ring and head lift  15x SLR 15x SLR with TA brace, B  15x rotational press,  15 lb B 12x lunges with rotation and TA  10x lumbar flex seated, 5:\" hold 10x lumbar rotation B  12x dead bugs with TA brace    Manual; good response to flex/rotate mob GR III B, QL release. GR III CPA to L3-L5 x 11 min. Manual; good response to flex/rotate mob GR III B, QL release. GR III x8 min 10x pallof press, 20 lbs B  2x10 rows, 25 lbs  15 x dead bugs c TA brace 15x SLR B LE  15x 5\" holds    2x10 pelvic tilts   15x multifidi ext over pillow 2x10 squats   15x chops, B 15 lbs  5 min upright bike at 2 resistance, height at level 3 12x oblique mini crunch, B (a little pain to the R) 12x oblique mini crunch, B (a little pain to the R)      Manual; good response to flex/rotate mob GR III B, QL release. GR III x8 min    5 min upright bike resistance 2 Manual: response to flex/rotate mob GR III B, QL release. GR III x8 min    HEP:  2x10 marches c TA brace, 15x L sciatic nerve glide in hooklying, 2x10 lumbar rotation, 2x10 pelvic tilts. X10 min     Charges:  Therex:2, manual:1       Total Timed Treatment: 45 min  Total Treatment Time: 45 min

## 2023-11-27 NOTE — TELEPHONE ENCOUNTER
Which pharmacy:[facility] #[id number] - [address] AT [address], [phone], [phone]     Prescription Refill Request - Patient advised can take 48-72 hours. Pt is out of medication. Name of Medication (strength, dose, qty requested:   pantoprazole 40 MG Oral Tab EC 90 tablet 0 8/9/2023    Sig:   Take 1 tablet (40 mg total) by mouth daily.

## 2023-11-27 NOTE — TELEPHONE ENCOUNTER
Future Appointments   Date Time Provider Gabe Middleton   11/27/2023  5:15 PM Aaliyah Lei, PT [facility] PHYS [address]   12/1/2023 10:40 AM Bruno Valle, DO [facility] [facility]   12/4/2023  2:15 PM Aaliyah Lei, PT [facility] PHYS [facility] AT [facility]   2/16/2024  1:00 PM EMG 35 NURSE [facility]

## 2023-11-29 RX ORDER — PANTOPRAZOLE SODIUM 40 MG/1
40 TABLET, DELAYED RELEASE ORAL DAILY
Qty: 90 TABLET | Refills: 0 | Status: SHIPPED | OUTPATIENT
Start: 2023-11-29 | End: 2023-12-01

## 2023-12-01 ENCOUNTER — TELEPHONE (OUTPATIENT)
Dept: INTERNAL MEDICINE CLINIC | Facility: CLINIC | Age: 58
End: 2023-12-01

## 2023-12-01 ENCOUNTER — OFFICE VISIT (OUTPATIENT)
Dept: INTERNAL MEDICINE CLINIC | Facility: CLINIC | Age: 58
End: 2023-12-01
Payer: MEDICAID

## 2023-12-01 VITALS
SYSTOLIC BLOOD PRESSURE: 102 MMHG | OXYGEN SATURATION: 99 % | HEART RATE: 70 BPM | BODY MASS INDEX: 30 KG/M2 | TEMPERATURE: 98 F | DIASTOLIC BLOOD PRESSURE: 68 MMHG | WEIGHT: 189.19 LBS

## 2023-12-01 DIAGNOSIS — J01.90 ACUTE RHINOSINUSITIS: Primary | ICD-10-CM

## 2023-12-01 DIAGNOSIS — G47.00 INSOMNIA, UNSPECIFIED TYPE: ICD-10-CM

## 2023-12-01 DIAGNOSIS — M15.9 PRIMARY OSTEOARTHRITIS INVOLVING MULTIPLE JOINTS: ICD-10-CM

## 2023-12-01 DIAGNOSIS — K21.9 GASTROESOPHAGEAL REFLUX DISEASE, UNSPECIFIED WHETHER ESOPHAGITIS PRESENT: ICD-10-CM

## 2023-12-01 PROCEDURE — 3078F DIAST BP <80 MM HG: CPT | Performed by: INTERNAL MEDICINE

## 2023-12-01 PROCEDURE — 3074F SYST BP LT 130 MM HG: CPT | Performed by: INTERNAL MEDICINE

## 2023-12-01 PROCEDURE — 99213 OFFICE O/P EST LOW 20 MIN: CPT | Performed by: INTERNAL MEDICINE

## 2023-12-01 RX ORDER — AMOXICILLIN AND CLAVULANATE POTASSIUM 875; 125 MG/1; MG/1
1 TABLET, FILM COATED ORAL 2 TIMES DAILY
Qty: 14 TABLET | Refills: 0 | Status: SHIPPED | OUTPATIENT
Start: 2023-12-01 | End: 2023-12-08

## 2023-12-01 RX ORDER — ZOLPIDEM TARTRATE 10 MG/1
10 TABLET ORAL NIGHTLY PRN
Qty: 30 TABLET | Refills: 0 | Status: SHIPPED | OUTPATIENT
Start: 2023-12-01

## 2023-12-01 RX ORDER — PANTOPRAZOLE SODIUM 40 MG/1
40 TABLET, DELAYED RELEASE ORAL DAILY
Qty: 90 TABLET | Refills: 1 | Status: SHIPPED | OUTPATIENT
Start: 2023-12-01

## 2023-12-01 RX ORDER — HYDROCODONE BITARTRATE AND ACETAMINOPHEN 5; 325 MG/1; MG/1
1 TABLET ORAL 2 TIMES DAILY PRN
Qty: 20 TABLET | Refills: 0 | Status: SHIPPED | OUTPATIENT
Start: 2023-12-01

## 2023-12-04 ENCOUNTER — OFFICE VISIT (OUTPATIENT)
Dept: PHYSICAL THERAPY | Facility: HOSPITAL | Age: 58
End: 2023-12-04
Attending: INTERNAL MEDICINE
Payer: MEDICAID

## 2023-12-04 PROCEDURE — 97140 MANUAL THERAPY 1/> REGIONS: CPT

## 2023-12-04 PROCEDURE — 97110 THERAPEUTIC EXERCISES: CPT

## 2023-12-04 NOTE — PROGRESS NOTES
Diagnosis:     Lumbar radiculitis (M54.16)  Lumbar spondylosis (W02.322)      Referring Provider: Mable Quintana  Date of Evaluation:    10/25/2023    Precautions:  None Next MD visit:   none scheduled  Date of Surgery: n/a   Insurance Primary/Secondary: BLUE CROSS MEDICAID / N/A     # Auth Visits: 8 per medicaid            Discharge Summary  Pt has attended 10 visits in Physical Therapy. Subjective: Sandra reports improved LBP and only has the occasional pain when she has not been moving too much. She  feels ready for DC. Pain: 0/10 LBP currently       Objective:     From eval:   AROM: (* denotes performed with pain)  Flexion: 87 deg  Extension: 15 deg   Sidebending: R min limited, L min limited  Rotation: min to nil limited B       Strength: (* denotes performed with pain)  LE   Hip flexion (L2): R 5/5; L 5/5  Hip abduction: R 4/5; L 5/5  Hip Extension: R NT/5; L NT/5            Hip ER: R 5/5; L 5/5  Hip IR: R 5/5; L 5/5  Knee Flexion: R 5/5; L 5/5            Knee extension (L3): R 5/5; L 5/5            DF (L4): R 5/5; L 5/5  Great Toe Ext (L5): R 5/5, L 5/5  PF (S1): R NT/5; L NT/5          Assessment:     Sandra demonstrates improved B hip strength globally. Her AROM of her lumbar spine has improved in all directions since her eval and her tolerance to standing and sitting has improved. Her segmental mobility/accessory motion has improved greatly since her first day. She has met all her LTG at this time, her biggest limitation continues to be her limitations in her R knee AROM following TKA over a year ago. She is to be DC with HEP and follow up with therapy as needed in the future. Goals:    (to be met in 6-8 visits)   Pt will improve transversus abdominis recruitment to perform proper isometric contraction without requiring verbal or tactile cuing to promote advancement of therex. Goal met.    Pt will demonstrate good understanding of proper posture and body mechanics to decrease pain and improve spinal safety Goal met. Pt will improve lumbar spine AROM SB and rot to min to nil limited deg to allow increase ease with bending forward to don shoes. Goal met. Pt will report improved symptom centralization and absence of radicular symptoms for 3 consecutive days to improve function with ADL   Pt will have decreased paraspinal mm tension to tolerate standing >30 minutes for work and home activities Goal met. Pt will demonstrate improved core strength to be able to perform squats with <2/10 pain. Goal met. Pt will be independent and compliant with comprehensive HEP to maintain progress achieved in PT  Goal met. Post Oswestry Disability Index Score  Post Score: 4 % (12/4/2023  2:06 PM)    -4 % improvement    Plan: DC with HEP. Patient/Family/Caregiver was advised of these findings, precautions, and treatment options and has agreed to actively participate in planning and for this course of care. Thank you for your referral. If you have any questions, please contact me at Dept: [phone]. Sincerely,  Electronically signed by therapist: Abida Contreras PT , DPT    Physician's certification required:  No  Please co-sign or sign and return this letter via fax as soon as possible to [phone]. I certify the need for these services furnished under this plan of treatment and while under my care.     X___________________________________________________ Date____________________    Certification From: 81/4/8728  To:3/3/2024    Date: 11/1/2023  TX#: 2/8 Date:  11/8/2023                TX#: 3/8 Date:  11/13/2023                TX#: 4/8 Date:  11/27/2023                TX#: 5/8 Date: 12/4/2023   Tx#: 6/8   Therex:    15x L sciatic nerve glide in hooklying   15x lumbar rotation Therex:  15x TA brace with pilate's ring Walking Ta brace crunch with pilates ring in p bars 2x30\" L piriformis stretch supine  15x pelvic tilts in supine DC assessment see above x15 min   2x10 dead bugs with TA brace   15x TA brace with pilates ring and head lift  15x SLR 15x SLR with TA brace, B  15x rotational press,  15 lb B 12x lunges with rotation and TA  10x lumbar flex seated, 5:\" hold 10x lumbar rotation B  12x dead bugs with TA brace Manual: response to flex/rotate mob GR III B, QL release. GR III x8 min   Manual; good response to flex/rotate mob GR III B, QL release. GR III CPA to L3-L5 x 11 min. Manual; good response to flex/rotate mob GR III B, QL release. GR III x8 min 10x pallof press, 20 lbs B  2x10 rows, 25 lbs  15 x dead bugs c TA brace 15x SLR B LE  15x 5\" holds 2x10 dead bugs  2x10 clams R side  10x seated ext at table with flex round out after   2x10 pelvic tilts   15x multifidi ext over pillow 2x10 squats   15x chops, B 15 lbs  5 min upright bike at 2 resistance, height at level 3 12x oblique mini crunch, B (a little pain to the R) 12x oblique mini crunch, B (a little pain to the R) 5 min upright bike     Manual; good response to flex/rotate mob GR III B, QL release. GR III x8 min    5 min upright bike resistance 2 Manual: response to flex/rotate mob GR III B, QL release. GR III x8 min -   HEP:  2x10 marches c TA brace, 15x L sciatic nerve glide in hooklying, 2x10 lumbar rotation, 2x10 pelvic tilts. X10 min     Charges:  Therex:2, manual:1       Total Timed Treatment: 45 min  Total Treatment Time: 45 min

## 2023-12-08 ENCOUNTER — TELEPHONE (OUTPATIENT)
Dept: INTERNAL MEDICINE CLINIC | Facility: CLINIC | Age: 58
End: 2023-12-08

## 2023-12-13 ENCOUNTER — TELEPHONE (OUTPATIENT)
Dept: INTERNAL MEDICINE CLINIC | Facility: CLINIC | Age: 58
End: 2023-12-13

## 2023-12-13 ENCOUNTER — NURSE ONLY (OUTPATIENT)
Dept: INTERNAL MEDICINE CLINIC | Facility: CLINIC | Age: 58
End: 2023-12-13
Payer: MEDICAID

## 2023-12-13 DIAGNOSIS — K21.9 GASTROESOPHAGEAL REFLUX DISEASE, UNSPECIFIED WHETHER ESOPHAGITIS PRESENT: ICD-10-CM

## 2023-12-13 DIAGNOSIS — Z86.73 HISTORY OF CVA (CEREBROVASCULAR ACCIDENT): ICD-10-CM

## 2023-12-13 DIAGNOSIS — I10 PRIMARY HYPERTENSION: ICD-10-CM

## 2023-12-13 DIAGNOSIS — E78.00 HYPERCHOLESTEREMIA: ICD-10-CM

## 2023-12-13 DIAGNOSIS — G47.00 INSOMNIA, UNSPECIFIED TYPE: ICD-10-CM

## 2023-12-13 DIAGNOSIS — E11.9 TYPE 2 DIABETES MELLITUS WITHOUT COMPLICATION, WITHOUT LONG-TERM CURRENT USE OF INSULIN (HCC): ICD-10-CM

## 2023-12-13 DIAGNOSIS — E11.9 TYPE 2 DIABETES MELLITUS WITHOUT COMPLICATION, WITHOUT LONG-TERM CURRENT USE OF INSULIN (HCC): Primary | ICD-10-CM

## 2023-12-13 RX ORDER — TRIAMTERENE AND HYDROCHLOROTHIAZIDE 37.5; 25 MG/1; MG/1
1 TABLET ORAL DAILY
Qty: 30 TABLET | Refills: 0 | Status: SHIPPED | OUTPATIENT
Start: 2023-12-13

## 2023-12-13 RX ORDER — ZOLPIDEM TARTRATE 10 MG/1
10 TABLET ORAL NIGHTLY PRN
Qty: 30 TABLET | Refills: 0 | Status: SHIPPED | OUTPATIENT
Start: 2023-12-13

## 2023-12-13 RX ORDER — ATORVASTATIN CALCIUM 80 MG/1
80 TABLET, FILM COATED ORAL NIGHTLY
Qty: 90 TABLET | Refills: 0 | Status: SHIPPED | OUTPATIENT
Start: 2023-12-13

## 2023-12-13 RX ORDER — PANTOPRAZOLE SODIUM 40 MG/1
40 TABLET, DELAYED RELEASE ORAL DAILY
Qty: 90 TABLET | Refills: 1 | Status: SHIPPED | OUTPATIENT
Start: 2023-12-13

## 2023-12-14 NOTE — TELEPHONE ENCOUNTER
Pt states has been taking pantoprazole for years, still has some left. She is going to call insurance to see why not being covered now. Otherwise will try omeprazole. Advised to contact us with any needs.

## 2024-01-08 DIAGNOSIS — I10 PRIMARY HYPERTENSION: ICD-10-CM

## 2024-01-08 NOTE — TELEPHONE ENCOUNTER
Last OV: Acute 12/01/23 MP     Future Appointments   Date Time Provider Department Center   1/10/2024 10:20 AM Laurent Patterson PA-C [facility]   1/10/2024  4:30 PM Evelyn Coles MD [facility] [address]   2/16/2024  1:00 PM [facility] 35 NURSE [facility]        Latest labs:   CBC, CMP, Lipid 09/27/23   Latest RX:   Medication Quantity Refills Start End   Triamterene-HCTZ 37.5-25 MG Oral Tab 30 tablet 0 12/13/2023 --   Sig:   Take 1 tablet by mouth daily.       Per protocol    Hypertension Medications Protocol Qxxmhn2701/08/2024 12:14 PM   Protocol Details CMP or BMP in past 12 months    Last serum creatinine< 2.0    Appointment in past 6 or next 3 months

## 2024-01-09 RX ORDER — TRIAMTERENE AND HYDROCHLOROTHIAZIDE 37.5; 25 MG/1; MG/1
1 TABLET ORAL DAILY
Qty: 90 TABLET | Refills: 0 | Status: SHIPPED | OUTPATIENT
Start: 2024-01-09

## 2024-01-10 ENCOUNTER — OFFICE VISIT (OUTPATIENT)
Dept: ORTHOPEDICS CLINIC | Facility: CLINIC | Age: 59
End: 2024-01-10
Payer: MEDICAID

## 2024-01-10 ENCOUNTER — OFFICE VISIT (OUTPATIENT)
Dept: SURGERY | Facility: CLINIC | Age: 59
End: 2024-01-10
Payer: MEDICAID

## 2024-01-10 VITALS — TEMPERATURE: 97 F | HEART RATE: 67 BPM

## 2024-01-10 VITALS — HEIGHT: 67 IN | BODY MASS INDEX: 29.66 KG/M2 | WEIGHT: 189 LBS

## 2024-01-10 DIAGNOSIS — Z87.19 HISTORY OF DIVERTICULITIS: ICD-10-CM

## 2024-01-10 DIAGNOSIS — R10.32 LLQ ABDOMINAL PAIN: Primary | ICD-10-CM

## 2024-01-10 DIAGNOSIS — M17.12 PRIMARY OSTEOARTHRITIS OF LEFT KNEE: Primary | ICD-10-CM

## 2024-01-10 PROCEDURE — 99214 OFFICE O/P EST MOD 30 MIN: CPT | Performed by: SURGERY

## 2024-01-10 PROCEDURE — 20610 DRAIN/INJ JOINT/BURSA W/O US: CPT | Performed by: PHYSICIAN ASSISTANT

## 2024-01-10 PROCEDURE — 3008F BODY MASS INDEX DOCD: CPT | Performed by: PHYSICIAN ASSISTANT

## 2024-01-10 RX ORDER — HYDROCODONE BITARTRATE AND ACETAMINOPHEN 10; 325 MG/1; MG/1
1 TABLET ORAL EVERY 6 HOURS PRN
Qty: 20 TABLET | Refills: 0 | Status: SHIPPED | OUTPATIENT
Start: 2024-01-10

## 2024-01-10 RX ORDER — TRIAMCINOLONE ACETONIDE 40 MG/ML
40 INJECTION, SUSPENSION INTRA-ARTICULAR; INTRAMUSCULAR ONCE
Status: COMPLETED | OUTPATIENT
Start: 2024-01-10 | End: 2024-01-10

## 2024-01-10 RX ORDER — DOCUSATE SODIUM 100 MG/1
100 CAPSULE, LIQUID FILLED ORAL 2 TIMES DAILY
Qty: 60 CAPSULE | Refills: 3 | Status: SHIPPED | OUTPATIENT
Start: 2024-01-10

## 2024-01-10 RX ADMIN — TRIAMCINOLONE ACETONIDE 40 MG: 40 INJECTION, SUSPENSION INTRA-ARTICULAR; INTRAMUSCULAR at 12:32:00

## 2024-01-10 NOTE — PROCEDURES
After informed consent, the patient's left knee was marked, locally anesthetized with skin refrigerant, prepped with topical antiseptic, and injected with a mixture of 1mL 40mg/mL Kenalog, 2mL 1% lidocaine and 2mL 0.5% marcaine through the inferolateral portal.  A band-aid was applied.  The patient tolerated the procedure well.    Laurent Patterson PA-C  [facility]

## 2024-01-10 NOTE — PROGRESS NOTES
Follow Up Visit Note       Active Problems      1. LLQ abdominal pain    2. History of diverticulitis          Chief Complaint   Chief Complaint   Patient presents with    [facility]     EP - Acute diverticulitis 8/16/23, COLONOSCOPY 9/7/23, CT ABDOMEN+PELVIS (CONTRAST ONLY) 9/27/23, Abdominal pain, patient states stomach is bothering her again, severe pain, patient states when she eats a meal it fades away, issues making a BM, constipation, gas, no other symptoms.         History of Present Illness  The patient is a 58-year-old female whom I initially met on July 17, 2023 for a colonoscopy.  The patient at that time had abdominal pain.  She ended up presenting to [facility] emergency room, where CT scan indicated acute diverticulitis.  She was started on Cipro and Flagyl, but had no improvement.  She then was admitted to [facility] on July 21.   He was treated with IV antibiotics and improved.  She subsequently underwent a colonoscopy on September 7, 2023 which demonstrated multiple widemouth diverticula of the descending colon.    The patient states that she has been taking stool softeners to ensure she has daily bowel movements.  She ran out of stool softeners and now takes a red pill that is a stimulant.  For the last 3 to 4 weeks, she has developed left sided abdominal pain.  She states it is worse in the morning.  She will eat and feels somewhat better.  She denies fevers and chills.  She has not had any nausea or vomiting.  The pain is reminiscent of her diverticular pain.      Allergies  Sandra is allergic to seasonal.    Past Medical / Surgical / Social / Family History    The past medical and past surgical history have been reviewed by me today.    Past Medical History:   Diagnosis Date    Diabetes (HCC)     Diverticulosis of large intestine     Essential hypertension      Past Surgical History:   Procedure Laterality Date    COLONOSCOPY N/A 9/7/2023    Procedure: COLONOSCOPY;  Surgeon: Sanket  Evelyn PEARSON MD;  Location:  ENDOSCOPY    EXPLORATORY OF ABDOMEN      KNEE REPLACEMENT SURGERY         The family history and social history have been reviewed by me today.    Family History   Problem Relation Age of Onset    Heart Disorder Father     Cancer Mother     Hypertension Mother      Social History     Socioeconomic History    Marital status: Legally    Tobacco Use    Smoking status: Every Day     Packs/day: .1     Types: Cigarettes   Substance and Sexual Activity    Alcohol use: Yes     Comment: occasional    Drug use: Never        Current Outpatient Medications:     atorvastatin 80 MG Oral Tab, TAKE 1 TABLET(80 MG) BY MOUTH EVERY NIGHT, Disp: 90 tablet, Rfl: 2    docusate sodium (COLACE) 100 MG Oral Cap, Take 1 capsule (100 mg total) by mouth 2 (two) times daily., Disp: 60 capsule, Rfl: 3    HYDROcodone-acetaminophen (NORCO)  MG Oral Tab, Take 1 tablet by mouth every 6 (six) hours as needed for Pain. 1-2 tablets by mouth every 4-6 hours as needed for pain., Disp: 20 tablet, Rfl: 0    Triamterene-HCTZ 37.5-25 MG Oral Tab, Take 1 tablet by mouth daily., Disp: 90 tablet, Rfl: 0    zolpidem 10 MG Oral Tab, Take 1 tablet (10 mg total) by mouth nightly as needed for Sleep., Disp: 30 tablet, Rfl: 0    pantoprazole 40 MG Oral Tab EC, Take 1 tablet (40 mg total) by mouth daily., Disp: 90 tablet, Rfl: 1    HYDROcodone-acetaminophen (NORCO) 5-325 MG Oral Tab, Take 1 tablet by mouth 2 (two) times daily as needed for Pain., Disp: 20 tablet, Rfl: 0    fluticasone propionate 50 MCG/ACT Nasal Suspension, 1 spray by Each Nare route as needed., Disp: 1 each, Rfl: 1    ergocalciferol 1.25 MG (54077 UT) Oral Cap, Take 1 capsule (50,000 Units total) by mouth once a week., Disp: 12 capsule, Rfl: 0    docusate sodium 100 MG Oral Cap, Take 1 capsule (100 mg total) by mouth 3 (three) times daily as needed for constipation., Disp: , Rfl:     cetirizine 10 MG Oral Tab, Take 1 tablet (10 mg total) by mouth as  needed for Allergies., Disp: , Rfl:     albuterol 108 (90 Base) MCG/ACT Inhalation Aero Soln, Inhale 2 puffs into the lungs 4 (four) times daily as needed., Disp: 1 each, Rfl: 0     Review of Systems  The Review of Systems has been reviewed by me during today.  Review of Systems   Constitutional: Negative.    HENT: Negative.     Eyes: Negative.    Respiratory: Negative.     Cardiovascular: Negative.    Gastrointestinal: Negative.    Genitourinary: Negative.    Musculoskeletal: Negative.    Skin: Negative.    Neurological: Negative.    Psychiatric/Behavioral: Negative.          Physical Findings   Pulse 67   Temp 96.5 °F (35.8 °C) (Temporal)   Physical Exam  Constitutional:       Appearance: Normal appearance. She is well-developed.   HENT:      Head: Normocephalic and atraumatic.   Eyes:      General: No scleral icterus.     Conjunctiva/sclera: Conjunctivae normal.   Neck:      Vascular: No JVD.   Abdominal:      General: There is no distension or abdominal bruit.      Palpations: Abdomen is soft. Abdomen is not rigid. There is no shifting dullness, fluid wave, mass or pulsatile mass.      Tenderness: There is abdominal tenderness (mild) in the left lower quadrant. There is no guarding or rebound. Negative signs include Vuong's sign and McBurney's sign.      Hernia: No hernia is present. There is no hernia in the ventral area.   Skin:     General: Skin is warm and dry.   Neurological:      Mental Status: She is alert and oriented to person, place, and time.   Psychiatric:         Behavior: Behavior normal.         Thought Content: Thought content normal.         Judgment: Judgment normal.          Assessment   1. LLQ abdominal pain    2. History of diverticulitis        Plan   To evaluate the source of her pain, I have asked her undergo a CT scan of the abdomen pelvis.  I will reach out to her with those results.  I did discuss that if this is recurrent diverticular pain, she may benefit from a robotic low  anterior resection of the rectosigmoid colon.  The risks, benefits, and alternatives to colon resection were discussed with the patient in detail. Risks included, but were not limited to, anastomotic breakdown, injury to other intra-abdominal organs, injury to the ureter, wound infection, and prolonged postoperative ileus.  The patient wishes to discuss any operative intervention with her family first.  I have prescribed her a stool softener as well as some Norco to help control her abdominal pain.  I will reach out to her with the CT scan results and her thoughts regarding surgery.      Imaging & Referrals   CT ABDOMEN+PELVIS(CONTRAST ONLY)(SIA=[id number])      Evelyn Coles MD

## 2024-01-11 DIAGNOSIS — Z86.73 HISTORY OF CVA (CEREBROVASCULAR ACCIDENT): ICD-10-CM

## 2024-01-11 DIAGNOSIS — E78.00 HYPERCHOLESTEREMIA: ICD-10-CM

## 2024-01-11 DIAGNOSIS — E11.9 TYPE 2 DIABETES MELLITUS WITHOUT COMPLICATION, WITHOUT LONG-TERM CURRENT USE OF INSULIN (HCC): ICD-10-CM

## 2024-01-11 RX ORDER — ATORVASTATIN CALCIUM 80 MG/1
80 TABLET, FILM COATED ORAL NIGHTLY
Qty: 90 TABLET | Refills: 2 | Status: SHIPPED | OUTPATIENT
Start: 2024-01-11 | End: 2024-04-02

## 2024-01-11 NOTE — TELEPHONE ENCOUNTER
Last OV: Acute 12/01/23 MP    Future Appointments   Date Time Provider Department Center   2/16/2024  1:00 PM [facility] 35 NURSE [facility]        Latest labs:   CBC, CMP, CBC 09/27/23  Latest RX:   Medication Quantity Refills Start End   atorvastatin 80 MG Oral Tab 90 tablet 0 12/13/2023 --   Sig:   Take 1 tablet (80 mg total) by mouth nightly.       Per protocol    Cholesterol Medication Protocol Goqykg8601/11/2024 05:58 AM   Protocol Details ALT < 80    ALT resulted within past year    Lipid panel within past 12 months    Appointment within past 12 or next 3 months

## 2024-01-22 NOTE — TELEPHONE ENCOUNTER
Pt scheduled on below for diabetes and wants to know if she needs to get any labs done for the appt.  Pt will get 2nd shingles shot done at this appt and will schedule her cpe when she's here.  Please advise.    Future Appointments   Date Time Provider Department Center   1/25/2024  8:00 AM  CT MAIN RM3  [facility]   2/16/2024  9:20 AM Mariaelena Valle, DO [facility]

## 2024-01-25 ENCOUNTER — HOSPITAL ENCOUNTER (OUTPATIENT)
Dept: CT IMAGING | Facility: HOSPITAL | Age: 59
Discharge: HOME | End: 2024-01-25
Attending: SURGERY
Payer: MEDICAID

## 2024-01-25 DIAGNOSIS — R10.32 LLQ ABDOMINAL PAIN: ICD-10-CM

## 2024-01-25 LAB
CREAT BLD-MCNC: 0.9 MG/DL
EGFRCR SERPLBLD CKD-EPI 2021: 73 ML/MIN/1.73M2 (ref 60–?)

## 2024-01-25 PROCEDURE — 74177 CT ABD & PELVIS W/CONTRAST: CPT | Performed by: SURGERY

## 2024-01-25 PROCEDURE — 82565 ASSAY OF CREATININE: CPT

## 2024-02-09 ENCOUNTER — TELEPHONE (OUTPATIENT)
Dept: INTERNAL MEDICINE CLINIC | Facility: CLINIC | Age: 59
End: 2024-02-09

## 2024-02-09 DIAGNOSIS — G47.00 INSOMNIA, UNSPECIFIED TYPE: ICD-10-CM

## 2024-02-09 NOTE — TELEPHONE ENCOUNTER
Which pharmacy:  [facility]    Prescription Refill Request - Patient advised can take 48-72 hours.      Name of Medication (strength, dose, qty requested:       zolpidem 10 MG Oral Tab     Pt states she took her last pill yesterday-[facility] was supposed to send refill request-I do not see one

## 2024-02-12 RX ORDER — ZOLPIDEM TARTRATE 10 MG/1
10 TABLET ORAL NIGHTLY PRN
Qty: 30 TABLET | Refills: 0 | Status: SHIPPED | OUTPATIENT
Start: 2024-02-12 | End: 2024-03-11

## 2024-02-12 NOTE — TELEPHONE ENCOUNTER
Pt calling on refill, she is out of medication      [facility] #[id number] - [place], [place] - [address] AT [facility] [address], [phone], [phone] [[id number]]           zolpidem 10 MG Oral Tab 30 tablet 0 12/13/2023 --   Sig:   Take 1 tablet (10 mg total) by mouth nightly as needed for Sleep.     Route:   Oral     PRN Reason(s):   Sleep

## 2024-02-15 ENCOUNTER — OFFICE VISIT (OUTPATIENT)
Facility: LOCATION | Age: 59
End: 2024-02-15
Payer: MEDICAID

## 2024-02-15 VITALS — HEART RATE: 60 BPM | TEMPERATURE: 96 F

## 2024-02-15 DIAGNOSIS — K57.32 SIGMOID DIVERTICULITIS: Primary | ICD-10-CM

## 2024-02-15 PROCEDURE — 99204 OFFICE O/P NEW MOD 45 MIN: CPT | Performed by: STUDENT IN AN ORGANIZED HEALTH CARE EDUCATION/TRAINING PROGRAM

## 2024-02-15 RX ORDER — METRONIDAZOLE 500 MG/1
TABLET ORAL
Qty: 3 TABLET | Refills: 0 | Status: SHIPPED | OUTPATIENT
Start: 2024-02-15

## 2024-02-15 RX ORDER — DOCUSATE SODIUM 100 MG/1
100 CAPSULE, LIQUID FILLED ORAL 2 TIMES DAILY
Qty: 60 CAPSULE | Refills: 2 | Status: SHIPPED | OUTPATIENT
Start: 2024-02-15

## 2024-02-15 RX ORDER — POLYETHYLENE GLYCOL 3350, SODIUM CHLORIDE, SODIUM BICARBONATE, POTASSIUM CHLORIDE 420; 11.2; 5.72; 1.48 G/4L; G/4L; G/4L; G/4L
POWDER, FOR SOLUTION ORAL
Qty: 1 EACH | Refills: 0 | Status: SHIPPED | OUTPATIENT
Start: 2024-02-15

## 2024-02-15 RX ORDER — HYDROCODONE BITARTRATE AND ACETAMINOPHEN 5; 325 MG/1; MG/1
1 TABLET ORAL EVERY 6 HOURS PRN
Qty: 20 TABLET | Refills: 0 | Status: SHIPPED | OUTPATIENT
Start: 2024-02-15

## 2024-02-15 RX ORDER — NEOMYCIN SULFATE 500 MG/1
TABLET ORAL
Qty: 6 TABLET | Refills: 0 | Status: SHIPPED | OUTPATIENT
Start: 2024-02-15

## 2024-02-15 NOTE — PATIENT INSTRUCTIONS
During this visit, the Enhanced Recovery after Intestinal Surgery (ERAS) Patient Guide was discussed with Sandra. She was provided a copy of the guide to review at home, which includes education on the strategy, pre-op, intra-op and what to expect during the hospital stay for elective colorectal cases.

## 2024-02-16 ENCOUNTER — OFFICE VISIT (OUTPATIENT)
Dept: INTERNAL MEDICINE CLINIC | Facility: CLINIC | Age: 59
End: 2024-02-16
Payer: MEDICAID

## 2024-02-16 VITALS
TEMPERATURE: 97 F | BODY MASS INDEX: 27.62 KG/M2 | RESPIRATION RATE: 18 BRPM | DIASTOLIC BLOOD PRESSURE: 78 MMHG | WEIGHT: 176 LBS | OXYGEN SATURATION: 97 % | SYSTOLIC BLOOD PRESSURE: 124 MMHG | HEIGHT: 67 IN | HEART RATE: 68 BPM

## 2024-02-16 DIAGNOSIS — Z23 NEED FOR SHINGLES VACCINE: ICD-10-CM

## 2024-02-16 DIAGNOSIS — R68.89 RECENT CHANGE IN WEIGHT: ICD-10-CM

## 2024-02-16 DIAGNOSIS — I10 PRIMARY HYPERTENSION: ICD-10-CM

## 2024-02-16 DIAGNOSIS — E11.9 TYPE 2 DIABETES MELLITUS WITHOUT COMPLICATION, WITHOUT LONG-TERM CURRENT USE OF INSULIN (HCC): Primary | ICD-10-CM

## 2024-02-16 DIAGNOSIS — K57.32 SIGMOID DIVERTICULITIS: ICD-10-CM

## 2024-02-16 LAB
CARTRIDGE LOT#: 655 NUMERIC
CREAT UR-SCNC: 223 MG/DL
HEMOGLOBIN A1C: 7.6 % (ref 4.3–5.6)
MICROALBUMIN UR-MCNC: 0.94 MG/DL
MICROALBUMIN/CREAT 24H UR-RTO: 4.2 UG/MG (ref ?–30)

## 2024-02-16 PROCEDURE — 82570 ASSAY OF URINE CREATININE: CPT | Performed by: INTERNAL MEDICINE

## 2024-02-16 PROCEDURE — 82043 UR ALBUMIN QUANTITATIVE: CPT | Performed by: INTERNAL MEDICINE

## 2024-02-16 NOTE — PROGRESS NOTES
Sandra Roche  2/1/1965    Chief Complaint   Patient presents with    Diabetes     TA RM11     SUBJECTIVE   Sandra Roche is a 59 year old female who presents for DM follow up.     Last A1c 7.0%. She has been off Metformin and controlling DM w/ lifestyle modifications.    Saw surgery yesterday and there is plan for colon resection in March for sigmoid diverticulitis.    Needs second dose of shingles vaccine.    Concerned that she is losing weight. Reviewed flowsheet and her weight has oscillated around 170-180 lbs. Appetite is still good and eating 2-3 meals per day.    Review of Systems   Review of Systems   No f/c/chest pain or sob. No cough. No abd pain/n/v/d. No ha or dizziness. No numbness, tingling, or weakness. No other complaints today.    OBJECTIVE:   /78   Pulse 68   Temp 97.2 °F (36.2 °C)   Resp 18   Ht 5' 7\" (1.702 m)   Wt 176 lb (79.8 kg)   SpO2 97%   BMI 27.57 kg/m²   Physical Exam   Constitutional: Oriented to person, place, and time. No distress.   Cardiovascular: Normal rate, regular rhythm and intact distal pulses.  No murmur, rubs or gallops.   Pulmonary/Chest: Effort normal and breath sounds normal. No respiratory distress.  Abdominal: Soft. Mildly tender to palpation.    Lab Results   Component Value Date     (H) 09/27/2023    BUN 16 09/27/2023    CREATSERUM 1.17 (H) 09/27/2023    ANIONGAP 7 09/27/2023    CA 9.6 09/27/2023     09/27/2023    K 4.2 09/27/2023     09/27/2023    CO2 28.0 09/27/2023    OSMOCALC 290 09/27/2023      Lab Results   Component Value Date    WBC 11.3 (H) 09/27/2023    RBC 4.69 09/27/2023    HGB 12.6 09/27/2023    HCT 40.8 09/27/2023    MCV 87.0 09/27/2023    MCH 26.9 09/27/2023    MCHC 30.9 (L) 09/27/2023    RDW 13.2 09/27/2023    .0 09/27/2023      Lab Results   Component Value Date    TSH 2.760 09/27/2023        ASSESSMENT AND PLAN:       ICD-10-CM    1. Type 2 diabetes mellitus without complication, without long-term current use  of insulin (HCC)  E11.9 Microalb/Creat Ratio, Random Urine [E]     POC Hgb A1C     Microalb/Creat Ratio, Random Urine [E]     CANCELED: Hemoglobin A1C [E]      2. Recent change in weight  R68.89 Weight has been fluctuating in the 170-180 range over the last year, no acute drop. Will continue to monitor for now. Appetite is good. May be 2/2 ongoing issues from diverticulitis.      3. Need for shingles vaccine  Z23 Zoster Recombinant Adjuvanted (Shingrix -Shingles) [85192]      4. Sigmoid diverticulitis  K57.32 Follow up w/ Surgery. There is plan for colon resection in March.   5. HTN  BP is well controlled today. Continue current regimen.   DM eye exam was done in office but there is no result? Need to look into this. May need to repeat or refer to Ophtho. In office A1c increased to 7.6%. Resume Metformin  mg.    The patient indicates understanding of these issues and agrees to the plan.  The patient is asked to return or present to the emergency room for worsening of symptoms.    TODAY'S ORDERS     No orders of the defined types were placed in this encounter.      Meds & Refills:  Requested Prescriptions      No prescriptions requested or ordered in this encounter       Imaging & Consults:  None    No follow-ups on file.  There are no Patient Instructions on file for this visit.    All questions were answered and the patient agrees with the plan.     Thank you,  Mariaelena Valle, DO

## 2024-02-19 NOTE — H&P
New Patient Visit Note       Active Problems      1. Sigmoid diverticulitis        Chief Complaint   Chief Complaint   Patient presents with    Establish Care     EP- CT Abd/Pel on 1/25 , Abdominal Pain ( r/s from [place])        History of Present Illness   This is a very nice 59-year-old female previously under the care of of Dr. Coles in regards to her history of recurrent/smoldering diverticulitis.  Patient presents to clinic today to establish care with me.    Patient began having issues with diverticulitis in July 2023.  She was admitted to the hospital on 7/21/2023 and improved with IV antibiotics.  Dr. Coles was able to perform a colonoscopy on the patient on 9/7/2023 which revealed a tortuous colon, multiple widemouth diverticula of the sigmoid and descending colon without any polyps or lesions.  Patient last followed up with Dr. Coles on 1/10/2024.    Over the last 2+ months, patient's had persistent left lower quadrant abdominal pain.  She also has constipation that she is controlling with MiraLAX and stool softeners.  She has had 2 recent CT scans on 9/7/2023 and 1/25/2024.  Both of these scans showed diverticulosis without diverticulitis.  No recent fevers, nausea or vomiting.  Dr. Coles prescribed her a course of Norco for pain control and was tentatively scheduling her for robotic low anterior colon resection.  Patient's had a history of a laparoscopic procedure for endometriosis.  She does not take any blood thinners.  She is currently unemployed.      Allergies  Sandra is allergic to seasonal.    Past Medical / Surgical / Social / Family History    The past medical and past surgical history have been reviewed by me today.    Past Medical History:   Diagnosis Date    Diabetes (HCC)     Diverticulosis of large intestine     Essential hypertension      Past Surgical History:   Procedure Laterality Date    COLONOSCOPY N/A 9/7/2023    Procedure: COLONOSCOPY;  Surgeon: Evelyn Coles MD;   Location:  ENDOSCOPY    EXPLORATORY OF ABDOMEN      KNEE REPLACEMENT SURGERY         The family history and social history have been reviewed by me today.    Family History   Problem Relation Age of Onset    Heart Disorder Father     Cancer Mother     Hypertension Mother      Social History     Socioeconomic History    Marital status: Legally    Tobacco Use    Smoking status: Every Day     Packs/day: .1     Types: Cigarettes   Substance and Sexual Activity    Alcohol use: Yes     Comment: occasional    Drug use: Never        Current Outpatient Medications:     PEG 3350-KCl-Na Bicarb-NaCl 420 g Oral Recon Soln, Starting at 4:00 pm the night before procedure, drink 8 ounces of the prep every 15-20 minutes until finished., Disp: 1 each, Rfl: 0    neomycin 500 MG Oral Tab, Take 2 tablets by mouth at 1pm, 2pm and 11pm, Disp: 6 tablet, Rfl: 0    metRONIDAZOLE (FLAGYL) 500 MG Oral Tab, Take 1 tablet by mouth at 1pm, 2pm and 11pm, Disp: 3 tablet, Rfl: 0    HYDROcodone-acetaminophen (NORCO) 5-325 MG Oral Tab, Take 1 tablet by mouth every 6 (six) hours as needed for Pain. 1-2 tablets by mouth every 4-6 hours as needed for pain., Disp: 20 tablet, Rfl: 0    docusate sodium 100 MG Oral Cap, Take 1 capsule (100 mg total) by mouth 2 (two) times daily., Disp: 60 capsule, Rfl: 2    zolpidem 10 MG Oral Tab, Take 1 tablet (10 mg total) by mouth nightly as needed for Sleep., Disp: 30 tablet, Rfl: 0    atorvastatin 80 MG Oral Tab, TAKE 1 TABLET(80 MG) BY MOUTH EVERY NIGHT, Disp: 90 tablet, Rfl: 2    docusate sodium (COLACE) 100 MG Oral Cap, Take 1 capsule (100 mg total) by mouth 2 (two) times daily., Disp: 60 capsule, Rfl: 3    HYDROcodone-acetaminophen (NORCO)  MG Oral Tab, Take 1 tablet by mouth every 6 (six) hours as needed for Pain. 1-2 tablets by mouth every 4-6 hours as needed for pain., Disp: 20 tablet, Rfl: 0    Triamterene-HCTZ 37.5-25 MG Oral Tab, Take 1 tablet by mouth daily., Disp: 90 tablet, Rfl: 0     pantoprazole 40 MG Oral Tab EC, Take 1 tablet (40 mg total) by mouth daily., Disp: 90 tablet, Rfl: 1    HYDROcodone-acetaminophen (NORCO) 5-325 MG Oral Tab, Take 1 tablet by mouth 2 (two) times daily as needed for Pain., Disp: 20 tablet, Rfl: 0    fluticasone propionate 50 MCG/ACT Nasal Suspension, 1 spray by Each Nare route as needed., Disp: 1 each, Rfl: 1    ergocalciferol 1.25 MG (01096 UT) Oral Cap, Take 1 capsule (50,000 Units total) by mouth once a week., Disp: 12 capsule, Rfl: 0    docusate sodium 100 MG Oral Cap, Take 1 capsule (100 mg total) by mouth 3 (three) times daily as needed for constipation., Disp: , Rfl:     cetirizine 10 MG Oral Tab, Take 1 tablet (10 mg total) by mouth as needed for Allergies., Disp: , Rfl:     albuterol 108 (90 Base) MCG/ACT Inhalation Aero Soln, Inhale 2 puffs into the lungs 4 (four) times daily as needed., Disp: 1 each, Rfl: 0      Review of Systems  A 10 point review of systems was performed and negative unless otherwise documented per HPI.    Physical Findings   Pulse 60   Temp (!) 96.4 °F (35.8 °C) (Temporal)   Physical Exam  Vitals and nursing note reviewed. Exam conducted with a chaperone present.   Constitutional:       General: She is not in acute distress.  HENT:      Head: Normocephalic and atraumatic.      Mouth/Throat:      Mouth: Mucous membranes are moist.   Cardiovascular:      Rate and Rhythm: Normal rate and regular rhythm.   Pulmonary:      Effort: Pulmonary effort is normal.   Abdominal:      General: There is no distension.      Palpations: Abdomen is soft.      Tenderness: There is abdominal tenderness (Mild left lower quadrant tenderness).   Musculoskeletal:         General: No deformity.   Skin:     General: Skin is warm and dry.   Neurological:      General: No focal deficit present.      Mental Status: She is alert.   Psychiatric:         Mood and Affect: Mood normal.     I have personally reviewed the imaging of patient's 3 most recent CT scans.  I  showed the imaging to the patient.  Though there is no obvious signs of inflammation around the sigmoid colon in the last 2 CT scans, there is some subtle thickening of the wall of the colon in this region suggestive of possible chronic diverticulitis or mild stricture.       Assessment   1. Sigmoid diverticulitis        This is a very nice 59-year-old female previously under the care of of Dr. Coles in regards to her history of recurrent/smoldering diverticulitis.  Patient presents to clinic today to establish care with me.    Patient began having issues with diverticulitis in July 2023.  She was admitted to the hospital on 7/21/2023 and improved with IV antibiotics.  Dr. Coles was able to perform a colonoscopy on the patient on 9/7/2023 which revealed a tortuous colon, multiple widemouth diverticula of the sigmoid and descending colon without any polyps or lesions.  Patient last followed up with Dr. Coles on 1/10/2024.    Over the last 2+ months, patient's had persistent left lower quadrant abdominal pain.  She also has constipation that she is controlling with MiraLAX and stool softeners.  She has had 2 recent CT scans on 9/7/2023 and 1/25/2024.  Both of these scans showed diverticulosis without diverticulitis.  No recent fevers, nausea or vomiting.  Dr. Coles prescribed her a course of Norco for pain control and was tentatively scheduling her for robotic low anterior colon resection.  Patient's had a history of a laparoscopic procedure for endometriosis.  She does not take any blood thinners.  She is currently unemployed.    I have personally reviewed the imaging of patient's 3 most recent CT scans.  I showed the imaging to the patient.  Though there is no obvious signs of inflammation around the sigmoid colon in the last 2 CT scans, there is some subtle thickening of the wall of the colon in this region suggestive of possible chronic diverticulitis or mild stricture.    Plan  I discussed treatment options  with the patient.  She has failed to improve with antibiotic therapy and bowel regimen.  She continues to have life-limiting left lower quadrant pain and I suspect she has had some degree of chronic/smoldering diverticulitis based on her symptoms and recent CT scans.  Therefore, I did offer her elective robotic low anterior colon resection, possible open, possible ostomy.    The details of surgery were discussed including the expected recovery time, risks, benefits and alternatives. Specific risks of surgery that were discussed included but not limited to pain, bleeding, infection, bowel or ureteral injury, and anastomotic complications such as leak, bleeding or stricture. There is also a risk of possible ostomy creation at the time of surgery. Patients are generally hospitalized for 2-5 days after this type of surgery.  I advise no lifting more than 10-15 pounds for total of 6 weeks after surgery.  Patient would remain on a soft/low fiber diet for 4 to 6 weeks after surgery.  She should complete a bowel prep with oral antibiotics per ERAS protocol prior to surgery.      Patient expressed understanding and wished to move forward with scheduling surgery.  Surgery has been scheduled for 3/20/2024.  Patient did request a refill of her Norco.  I agreed to give her 1 additional refill of the Norco but informed her I would not be able to provide any further narcotic pain medication until after surgery.  Patient expressed understanding.     No orders of the defined types were placed in this encounter.      Imaging & Referrals   None    Follow Up  No follow-ups on file.    Paulo Kearney MD

## 2024-02-20 ENCOUNTER — TELEPHONE (OUTPATIENT)
Dept: INTERNAL MEDICINE CLINIC | Facility: CLINIC | Age: 59
End: 2024-02-20

## 2024-02-20 NOTE — TELEPHONE ENCOUNTER
Pt stated she missed a call from our office and would like a call back. Couldn't find a TE on this.

## 2024-02-22 RX ORDER — SODIUM CHLORIDE, SODIUM LACTATE, POTASSIUM CHLORIDE, CALCIUM CHLORIDE 600; 310; 30; 20 MG/100ML; MG/100ML; MG/100ML; MG/100ML
INJECTION, SOLUTION INTRAVENOUS CONTINUOUS
Status: CANCELLED | OUTPATIENT
Start: 2024-02-22

## 2024-02-29 ENCOUNTER — TELEPHONE (OUTPATIENT)
Dept: ORTHOPEDICS CLINIC | Facility: CLINIC | Age: 59
End: 2024-02-29

## 2024-02-29 DIAGNOSIS — M17.12 PRIMARY OSTEOARTHRITIS OF LEFT KNEE: Primary | ICD-10-CM

## 2024-03-01 NOTE — TELEPHONE ENCOUNTER
Laurent,  Did you discuss pt getting visco injection again? She did previously have Synvisc 1 on 8/30/23.    There is no order from [facility] for any visco injection. She did have one back in August, but I do not see any further orders for one.    She has had regular steroid injections, most recently 1/10/24.

## 2024-03-04 ENCOUNTER — HOSPITAL ENCOUNTER (OUTPATIENT)
Age: 59
Discharge: HOME | End: 2024-03-04
Payer: MEDICAID

## 2024-03-04 ENCOUNTER — LABORATORY ENCOUNTER (OUTPATIENT)
Dept: LAB | Age: 59
End: 2024-03-04
Attending: STUDENT IN AN ORGANIZED HEALTH CARE EDUCATION/TRAINING PROGRAM
Payer: MEDICAID

## 2024-03-04 ENCOUNTER — EKG ENCOUNTER (OUTPATIENT)
Dept: LAB | Age: 59
End: 2024-03-04
Attending: STUDENT IN AN ORGANIZED HEALTH CARE EDUCATION/TRAINING PROGRAM

## 2024-03-04 VITALS
OXYGEN SATURATION: 100 % | TEMPERATURE: 97 F | HEIGHT: 67 IN | BODY MASS INDEX: 27.78 KG/M2 | SYSTOLIC BLOOD PRESSURE: 112 MMHG | HEART RATE: 74 BPM | WEIGHT: 177 LBS | DIASTOLIC BLOOD PRESSURE: 69 MMHG | RESPIRATION RATE: 20 BRPM

## 2024-03-04 DIAGNOSIS — Z01.818 PRE-OP TESTING: ICD-10-CM

## 2024-03-04 DIAGNOSIS — J30.2 SEASONAL ALLERGIES: ICD-10-CM

## 2024-03-04 DIAGNOSIS — J06.9 VIRAL UPPER RESPIRATORY TRACT INFECTION: Primary | ICD-10-CM

## 2024-03-04 LAB
ANION GAP SERPL CALC-SCNC: 6 MMOL/L (ref 0–18)
ATRIAL RATE: 66 BPM
BUN BLD-MCNC: 12 MG/DL (ref 9–23)
CALCIUM BLD-MCNC: 9.8 MG/DL (ref 8.5–10.1)
CHLORIDE SERPL-SCNC: 107 MMOL/L (ref 98–112)
CO2 SERPL-SCNC: 23 MMOL/L (ref 21–32)
CREAT BLD-MCNC: 0.85 MG/DL
EGFRCR SERPLBLD CKD-EPI 2021: 78 ML/MIN/1.73M2 (ref 60–?)
ERYTHROCYTE [DISTWIDTH] IN BLOOD BY AUTOMATED COUNT: 13.5 %
FASTING STATUS PATIENT QL REPORTED: NO
GLUCOSE BLD-MCNC: 102 MG/DL (ref 70–99)
GLUCOSE BLD-MCNC: 96 MG/DL (ref 70–99)
HCT VFR BLD AUTO: 39.2 %
HGB BLD-MCNC: 12.4 G/DL
MCH RBC QN AUTO: 27.1 PG (ref 26–34)
MCHC RBC AUTO-ENTMCNC: 31.6 G/DL (ref 31–37)
MCV RBC AUTO: 85.6 FL
OSMOLALITY SERPL CALC.SUM OF ELEC: 282 MOSM/KG (ref 275–295)
P AXIS: 41 DEGREES
P-R INTERVAL: 154 MS
PLATELET # BLD AUTO: 390 10(3)UL (ref 150–450)
POCT INFLUENZA A: NEGATIVE
POCT INFLUENZA B: NEGATIVE
POTASSIUM SERPL-SCNC: 4.1 MMOL/L (ref 3.5–5.1)
Q-T INTERVAL: 422 MS
QRS DURATION: 82 MS
QTC CALCULATION (BEZET): 442 MS
R AXIS: 12 DEGREES
RBC # BLD AUTO: 4.58 X10(6)UL
SARS-COV-2 RNA RESP QL NAA+PROBE: NOT DETECTED
SODIUM SERPL-SCNC: 136 MMOL/L (ref 136–145)
T AXIS: 17 DEGREES
VENTRICULAR RATE: 66 BPM
WBC # BLD AUTO: 10.3 X10(3) UL (ref 4–11)

## 2024-03-04 PROCEDURE — 87502 INFLUENZA DNA AMP PROBE: CPT | Performed by: PHYSICIAN ASSISTANT

## 2024-03-04 PROCEDURE — 36415 COLL VENOUS BLD VENIPUNCTURE: CPT

## 2024-03-04 PROCEDURE — 82962 GLUCOSE BLOOD TEST: CPT

## 2024-03-04 PROCEDURE — 99213 OFFICE O/P EST LOW 20 MIN: CPT

## 2024-03-04 PROCEDURE — 93005 ELECTROCARDIOGRAM TRACING: CPT

## 2024-03-04 PROCEDURE — 99212 OFFICE O/P EST SF 10 MIN: CPT

## 2024-03-04 PROCEDURE — 80048 BASIC METABOLIC PNL TOTAL CA: CPT

## 2024-03-04 PROCEDURE — 85027 COMPLETE CBC AUTOMATED: CPT

## 2024-03-04 PROCEDURE — 93010 ELECTROCARDIOGRAM REPORT: CPT | Performed by: INTERNAL MEDICINE

## 2024-03-04 RX ORDER — FLUTICASONE PROPIONATE 50 MCG
2 SPRAY, SUSPENSION (ML) NASAL DAILY
Qty: 16 G | Refills: 0 | Status: SHIPPED | OUTPATIENT
Start: 2024-03-04 | End: 2024-04-03

## 2024-03-04 RX ORDER — CETIRIZINE HYDROCHLORIDE 10 MG/1
10 TABLET ORAL DAILY
Qty: 30 TABLET | Refills: 0 | Status: SHIPPED | OUTPATIENT
Start: 2024-03-04 | End: 2024-04-03

## 2024-03-04 NOTE — ED INITIAL ASSESSMENT (HPI)
Pt here for cough and runny nose since yesterday.   Denies fever.   Having chills.   Denies n/v.    Denies chest pain/sob.

## 2024-03-04 NOTE — ED PROVIDER NOTES
Patient Seen in: [facility]      History     Chief Complaint   Patient presents with    Cough     Stated Complaint: Cough/URI, Runny Nose    Subjective:   HPI    Sandra is a pleasant 59-year-old female with a known history of hypertension, diabetes hide for lipidemia stroke and pneumonia who comes in today with nasal congestion and sinus pressure.,  Patient denies fever or chills.  She started with symptoms yesterday after taking a walk outside.  Patient does have diabetes has not been checking her blood sugars.    Objective:   Past Medical History:   Diagnosis Date    Diabetes (HCC)     Diverticulosis of large intestine     Esophageal reflux     Essential hypertension     High blood pressure     High cholesterol     Osteoarthritis     Pneumonia due to organism     Stroke (HCC)     2008    Visual impairment     READING GLASSES              Past Surgical History:   Procedure Laterality Date    COLONOSCOPY N/A 9/7/2023    Procedure: COLONOSCOPY;  Surgeon: Evelyn Coles MD;  Location:  ENDOSCOPY    EXPLORATORY OF ABDOMEN      KNEE REPLACEMENT SURGERY                  Social History     Socioeconomic History    Marital status: Legally    Tobacco Use    Smoking status: Every Day     Packs/day: .1     Types: Cigarettes    Smokeless tobacco: Never   Vaping Use    Vaping Use: Never used   Substance and Sexual Activity    Alcohol use: Yes     Comment: occasional    Drug use: Never              Review of Systems    Positive for stated complaint: Cough/URI, Runny Nose  Other systems are as noted in HPI.  Constitutional and vital signs reviewed.      All other systems reviewed and negative except as noted above.    Physical Exam     ED Triage Vitals [03/04/24 0941]   /69   Pulse 74   Resp 20   Temp 97.2 °F (36.2 °C)   Temp src Temporal   SpO2 100 %   O2 Device None (Room air)       Current:/69   Pulse 74   Temp 97.2 °F (36.2 °C) (Temporal)   Resp 20   Ht 170.2 cm (5' 7\")   Wt  80.3 kg   SpO2 100%   BMI 27.72 kg/m²         Physical Exam    General Appearance: Alert, cooperative, no distress, appropriate for age   Head: Normocephalic, without obvious abnormality   Eyes: PERRL,  conjunctiva and cornea clear, both eyes   Ears: TM pearly gray color and semitransparent, external ear canals normal, both ears   Nose: Nares symmetrical, septum midline, mucosa normal, clear watery discharge; no sinus tenderness   Throat: Lips, tongue, and mucosa are moist, pink, and intact; teeth intact. No erythema, no exudates or tonsillar hypertrophy, uvula midline, no trismus or drooling no phonation changes, patient handling secretions well   Neck: Supple; no anterior or posterior cervical adenopathy, no neck rigidity or meningeal signs  Lungs: Clear to auscultation bilaterally, respirations unlabored. No wheezing, rales or rhonchi.   Heart: NSR, S1, S2 present. No murmurs, rubs or gallops.  Skin: no rash       ED Course     Labs Reviewed   POCT GLUCOSE - Normal   RAPID SARS-COV-2 BY PCR - Normal   POCT FLU TEST - Normal    Narrative:     This assay is a rapid molecular in vitro test utilizing nucleic acid amplification of influenza A and B viral RNA.       MDM             Medical Decision Making  59-year-old female who comes in today complaining of upper respiratory symptoms sinus congestion and nasal congestion.  Patient without fever chills no known sick contacts.    Past medical history: Diabetes, GERD, hypertension, hyperlipidemia    Problems Addressed:  Seasonal allergies: acute illness or injury  Viral upper respiratory tract infection: acute illness or injury    Amount and/or Complexity of Data Reviewed  Labs: ordered. Decision-making details documented in ED Course.     Details: COVID-negative, flu negative, Accu-Chek 96    Risk  OTC drugs.  Prescription drug management.  Risk Details: Clinical Impression: Viral upper respiratory infection, seasonal allergies       The differential diagnosis before  testing included viral syndrome, Acute Sinusitis, pneumonia, which is a medical condition that poses a threat to life/function.     Zyrtec, Flonase nasal spray close follow-up hydrate well        Disposition and Plan     Clinical Impression:  1. Viral upper respiratory tract infection    2. Seasonal allergies         Disposition:  Discharge  3/4/2024 11:03 am    Follow-up:  Mariaelena Valle,   [address] [address]  [phone]    Schedule an appointment as soon as possible for a visit   If symptoms worsen          Medications Prescribed:  Discharge Medication List as of 3/4/2024 11:06 AM        START taking these medications    Details   !! fluticasone propionate 50 MCG/ACT Nasal Suspension 2 sprays by Nasal route daily., Normal, Disp-16 g, R-0      !! cetirizine 10 MG Oral Tab Take 1 tablet (10 mg total) by mouth daily., Normal, Disp-30 tablet, R-0       !! - Potential duplicate medications found. Please discuss with provider.          This report has been produced using speech recognition software and may contain errors related to that system including, but not limited to, errors in grammar, punctuation, and spelling, as well as words and phrases that possibly may have been recognized inappropriately.  If there are any questions or concerns, contact the dictating provider for clarification.     NOTE: The 21st Century Cares Act makes medical notes available to patients.  Be advised that this is a medical document written in medical language and may contain abbreviations or verbiage that is unfamiliar or direct.  It is primarily intended to carry relevant historical information, physical exam findings, and the clinical assessment of the physician.

## 2024-03-05 ENCOUNTER — TELEPHONE (OUTPATIENT)
Dept: ORTHOPEDICS CLINIC | Facility: CLINIC | Age: 59
End: 2024-03-05

## 2024-03-11 ENCOUNTER — OFFICE VISIT (OUTPATIENT)
Dept: INTERNAL MEDICINE CLINIC | Facility: CLINIC | Age: 59
End: 2024-03-11
Payer: MEDICAID

## 2024-03-11 VITALS
OXYGEN SATURATION: 98 % | DIASTOLIC BLOOD PRESSURE: 74 MMHG | BODY MASS INDEX: 27.49 KG/M2 | TEMPERATURE: 97 F | RESPIRATION RATE: 16 BRPM | WEIGHT: 175.19 LBS | HEIGHT: 67 IN | HEART RATE: 72 BPM | SYSTOLIC BLOOD PRESSURE: 118 MMHG

## 2024-03-11 DIAGNOSIS — E53.8 VITAMIN B12 DEFICIENCY: ICD-10-CM

## 2024-03-11 DIAGNOSIS — M67.40 GANGLION CYST: ICD-10-CM

## 2024-03-11 DIAGNOSIS — G47.00 INSOMNIA, UNSPECIFIED TYPE: ICD-10-CM

## 2024-03-11 DIAGNOSIS — E55.9 VITAMIN D DEFICIENCY: ICD-10-CM

## 2024-03-11 DIAGNOSIS — Z01.818 PRE-OP EVALUATION: Primary | ICD-10-CM

## 2024-03-11 DIAGNOSIS — Z87.891 HISTORY OF CIGARETTE SMOKING: ICD-10-CM

## 2024-03-11 DIAGNOSIS — E78.2 MIXED HYPERLIPIDEMIA: ICD-10-CM

## 2024-03-11 RX ORDER — ZOLPIDEM TARTRATE 10 MG/1
10 TABLET ORAL NIGHTLY PRN
Qty: 90 TABLET | Refills: 0 | Status: SHIPPED | OUTPATIENT
Start: 2024-03-11

## 2024-03-11 NOTE — PROGRESS NOTES
Sandra Roche is a 59 year old female     HPI:   The patient has a planned robotic low anterior colon resection poss ostomy and poss open w/ Dr. Kearney on 3/20 at [facility].     Revised Cardiac Risk Index (modified Ray Index): 1 point for history of CVA     How many METS can the patient achieve? The patient can achieve greater than 4 METS.    Complications with anesthesia?: No    Current use of ASA/NSAID's:  No    Current Outpatient Medications   Medication Sig Dispense Refill    fluticasone propionate 50 MCG/ACT Nasal Suspension 2 sprays by Nasal route daily. 16 g 0    cetirizine 10 MG Oral Tab Take 1 tablet (10 mg total) by mouth daily. 30 tablet 0    metFORMIN  MG Oral Tablet 24 Hr Take 1 tablet (500 mg total) by mouth daily. 90 tablet 3    PEG 3350-KCl-Na Bicarb-NaCl 420 g Oral Recon Soln Starting at 4:00 pm the night before procedure, drink 8 ounces of the prep every 15-20 minutes until finished. 1 each 0    neomycin 500 MG Oral Tab Take 2 tablets by mouth at 1pm, 2pm and 11pm 6 tablet 0    metRONIDAZOLE (FLAGYL) 500 MG Oral Tab Take 1 tablet by mouth at 1pm, 2pm and 11pm 3 tablet 0    HYDROcodone-acetaminophen (NORCO) 5-325 MG Oral Tab Take 1 tablet by mouth every 6 (six) hours as needed for Pain. 1-2 tablets by mouth every 4-6 hours as needed for pain. 20 tablet 0    docusate sodium 100 MG Oral Cap Take 1 capsule (100 mg total) by mouth 2 (two) times daily. 60 capsule 2    zolpidem 10 MG Oral Tab Take 1 tablet (10 mg total) by mouth nightly as needed for Sleep. 30 tablet 0    atorvastatin 80 MG Oral Tab TAKE 1 TABLET(80 MG) BY MOUTH EVERY NIGHT 90 tablet 2    HYDROcodone-acetaminophen (NORCO)  MG Oral Tab Take 1 tablet by mouth every 6 (six) hours as needed for Pain. 1-2 tablets by mouth every 4-6 hours as needed for pain. 20 tablet 0    Triamterene-HCTZ 37.5-25 MG Oral Tab Take 1 tablet by mouth daily. 90 tablet 0    pantoprazole 40 MG Oral Tab EC Take 1 tablet (40 mg total) by mouth  daily. 90 tablet 1    HYDROcodone-acetaminophen (NORCO) 5-325 MG Oral Tab Take 1 tablet by mouth 2 (two) times daily as needed for Pain. 20 tablet 0    fluticasone propionate 50 MCG/ACT Nasal Suspension 1 spray by Each Nare route as needed. 1 each 1    ergocalciferol 1.25 MG (61808 UT) Oral Cap Take 1 capsule (50,000 Units total) by mouth once a week. 12 capsule 0    cetirizine 10 MG Oral Tab Take 1 tablet (10 mg total) by mouth as needed for Allergies.      albuterol 108 (90 Base) MCG/ACT Inhalation Aero Soln Inhale 2 puffs into the lungs 4 (four) times daily as needed. 1 each 0      Allergies:   Allergies   Allergen Reactions    Seasonal OTHER (SEE COMMENTS)     Watery eyes, sneezing       Past Medical History:   Diagnosis Date    Diabetes ([facility])     Diverticulosis of large intestine     Esophageal reflux     Essential hypertension     High blood pressure     High cholesterol     Osteoarthritis     Pneumonia due to organism     Stroke ([facility])     2008    Visual impairment     READING GLASSES      Past Surgical History:   Procedure Laterality Date    COLONOSCOPY N/A 9/7/2023    Procedure: COLONOSCOPY;  Surgeon: Evelyn Coles MD;  Location:  ENDOSCOPY    EXPLORATORY OF ABDOMEN      KNEE REPLACEMENT SURGERY        Family History   Problem Relation Age of Onset    Heart Disorder Father     Cancer Mother     Hypertension Mother       Social History:   Social History     Socioeconomic History    Marital status: Legally    Tobacco Use    Smoking status: Every Day     Packs/day: .1     Types: Cigarettes    Smokeless tobacco: Never   Vaping Use    Vaping Use: Never used   Substance and Sexual Activity    Alcohol use: Yes     Comment: occasional    Drug use: Never           REVIEW OF SYSTEMS:   GENERAL: feels well otherwise  SKIN: denies any unusual skin lesions  EYES:denies blurred vision or double vision  HEENT: was seen in  on 3/4 for URI and seasonal allergies, now resolved  LUNGS: denies shortness of  breath with exertion  CARDIOVASCULAR: denies chest pain on exertion  GI: admits to abdominal pain  MUSCULOSKELETAL: admits to knee pain, seeing Orthopedics for injections  NEURO: denies headaches  HEMATOLOGIC: denies history of anemia  ENDOCRINE: Last A1c 7.6%.    EXAM:   /74   Pulse 72   Temp 97.3 °F (36.3 °C)   Resp 16   Ht 5' 7\" (1.702 m)   Wt 175 lb 3.2 oz (79.5 kg)   SpO2 98%   BMI 27.44 kg/m²   GENERAL: well developed, well nourished,in no apparent distress  SKIN: no rashes,no suspicious lesions on visualized skin  HEENT: atraumatic, normocephalic, oropharynx is clear and moist  EYES: PERRLA, EOMI,conjunctiva are clear  NECK: supple,no adenopathy,no carotid bruits  LUNGS: clear to auscultation bilaterally, no adventitious breath sounds  CARDIO: RRR without murmur  GI: bowel sounds present, pain in the lower quadrants  MUSCULOSKELETAL: back is not tender  EXTREMITIES: no cyanosis, clubbing or edema    ASSESSMENT AND PLAN:   Sandra Roche is a 59 year old female who presents for a pre-operative physical exam. The patient has a planned robotic low anterior colon resection poss ostomy and poss open w/ Dr. Kearney on 3/20 at [facility].  She has no history of significant cardiac or pulmonary conditions. RCRI is low 1 point for history of CVA. We discussed possibly starting ASA AFTER surgery. Recent increase in A1c but otherwise DM is uncomplicated.    CBC, CMP and EKG were ordered by surgeon's office. Reviewed by me and are all within normal range, EKG has no ischemic changes.    The patient is at acceptable risk for  surgical procedure.      ICD-10-CM    1. Pre-op evaluation  Z01.818       2. Ganglion cyst  M67.40 Ortho Referral - In Network      3. Vitamin D deficiency  E55.9 Vitamin D [E]      4. Vitamin B12 deficiency  E53.8 Vitamin B12 [E]      5. Insomnia, unspecified type  G47.00 zolpidem 10 MG Oral Tab      6. Mixed hyperlipidemia  E78.2 Lipid Panel [E]     CANCELED: Lipid Panel [E]       7. History of cigarette smoking  Z87.891 7.5 pack year history. Counseled on cessation.         Mariaelena Valle DO

## 2024-03-15 ENCOUNTER — TELEPHONE (OUTPATIENT)
Facility: LOCATION | Age: 59
End: 2024-03-15

## 2024-03-15 NOTE — TELEPHONE ENCOUNTER
HEAVENLY CEVALLOS Patient  Member ID  [id number]    Date of Birth  1965-02-01    Gender  NA    Transaction Type  Inpatient Authorization    Organization  [facility]    Payer  [facility] logo  Certificate Information  Certification Number  [id number]    Status  CERTIFIED IN TOTAL    Service Information  Service Type  2 - Surgical    Place of Service  21 - Inpatient Hospital    Admission - Discharge Date  2024-03-20 - 2024-03-23    Admission Type  Elective    Quantity  4 Days  Diagnosis Code 1   - Dvtrcli of intest part unsp w/o perf or abscess w/o bleed    Procedure Code 1 (CPT/HCPCS)  08895 - L COLECTOMY/COLOPROCTOSTOMY    Rendering Providers     Provider 1  Name  [facility]    NPI  [id number]    Provider Role  Facility    Address  [address] [place] [address]    Provider 2  Name  BRAYAN COLEMAN    NPI  [id number]    Provider Role  Attending    Address  [address] [place] [address]    Provider 3  Name  [facility]    NPI  [id number]    Provider Role  Provider Organization    Address  [address] [place] [address]

## 2024-03-18 ENCOUNTER — LAB ENCOUNTER (OUTPATIENT)
Dept: LAB | Age: 59
End: 2024-03-18
Attending: INTERNAL MEDICINE
Payer: MEDICAID

## 2024-03-18 DIAGNOSIS — Z01.818 PRE-OP TESTING: ICD-10-CM

## 2024-03-18 DIAGNOSIS — E53.8 VITAMIN B12 DEFICIENCY: ICD-10-CM

## 2024-03-18 DIAGNOSIS — E78.2 MIXED HYPERLIPIDEMIA: ICD-10-CM

## 2024-03-18 DIAGNOSIS — E55.9 VITAMIN D DEFICIENCY: ICD-10-CM

## 2024-03-18 LAB
BASOPHILS # BLD AUTO: 0.03 X10(3) UL (ref 0–0.2)
BASOPHILS NFR BLD AUTO: 0.3 %
CHOLEST SERPL-MCNC: 146 MG/DL (ref ?–200)
EOSINOPHIL # BLD AUTO: 0.23 X10(3) UL (ref 0–0.7)
EOSINOPHIL NFR BLD AUTO: 2.1 %
ERYTHROCYTE [DISTWIDTH] IN BLOOD BY AUTOMATED COUNT: 13.4 %
FASTING PATIENT LIPID ANSWER: YES
HCT VFR BLD AUTO: 38.2 %
HDLC SERPL-MCNC: 51 MG/DL (ref 40–59)
HGB BLD-MCNC: 11.9 G/DL
IMM GRANULOCYTES # BLD AUTO: 0.04 X10(3) UL (ref 0–1)
IMM GRANULOCYTES NFR BLD: 0.4 %
LDLC SERPL CALC-MCNC: 73 MG/DL (ref ?–100)
LYMPHOCYTES # BLD AUTO: 2.8 X10(3) UL (ref 1–4)
LYMPHOCYTES NFR BLD AUTO: 25.1 %
MCH RBC QN AUTO: 26.7 PG (ref 26–34)
MCHC RBC AUTO-ENTMCNC: 31.2 G/DL (ref 31–37)
MCV RBC AUTO: 85.8 FL
MONOCYTES # BLD AUTO: 0.62 X10(3) UL (ref 0.1–1)
MONOCYTES NFR BLD AUTO: 5.6 %
NEUTROPHILS # BLD AUTO: 7.45 X10 (3) UL (ref 1.5–7.7)
NEUTROPHILS # BLD AUTO: 7.45 X10(3) UL (ref 1.5–7.7)
NEUTROPHILS NFR BLD AUTO: 66.5 %
NONHDLC SERPL-MCNC: 95 MG/DL (ref ?–130)
PLATELET # BLD AUTO: 364 10(3)UL (ref 150–450)
RBC # BLD AUTO: 4.45 X10(6)UL
TRIGL SERPL-MCNC: 127 MG/DL (ref 30–149)
VIT B12 SERPL-MCNC: 1139 PG/ML (ref 193–986)
VIT D+METAB SERPL-MCNC: 29.4 NG/ML (ref 30–100)
VLDLC SERPL CALC-MCNC: 19 MG/DL (ref 0–30)
WBC # BLD AUTO: 11.2 X10(3) UL (ref 4–11)

## 2024-03-18 PROCEDURE — 82607 VITAMIN B-12: CPT

## 2024-03-18 PROCEDURE — 36415 COLL VENOUS BLD VENIPUNCTURE: CPT

## 2024-03-18 PROCEDURE — 85025 COMPLETE CBC W/AUTO DIFF WBC: CPT

## 2024-03-18 PROCEDURE — 80061 LIPID PANEL: CPT

## 2024-03-18 PROCEDURE — 82306 VITAMIN D 25 HYDROXY: CPT

## 2024-03-19 ENCOUNTER — TELEPHONE (OUTPATIENT)
Dept: INTERNAL MEDICINE CLINIC | Facility: CLINIC | Age: 59
End: 2024-03-19

## 2024-03-19 NOTE — TELEPHONE ENCOUNTER
Called and spoke to pt. Relayed lab results and recommendations. Pt verbalized understanding and is agreeable to plan.

## 2024-03-20 ENCOUNTER — HOSPITAL ENCOUNTER (INPATIENT)
Facility: HOSPITAL | Age: 59
LOS: 3 days | Discharge: HOME | End: 2024-03-23
Attending: STUDENT IN AN ORGANIZED HEALTH CARE EDUCATION/TRAINING PROGRAM | Admitting: STUDENT IN AN ORGANIZED HEALTH CARE EDUCATION/TRAINING PROGRAM
Payer: MEDICAID

## 2024-03-20 ENCOUNTER — ANESTHESIA EVENT (OUTPATIENT)
Dept: SURGERY | Facility: HOSPITAL | Age: 59
End: 2024-03-20
Payer: MEDICAID

## 2024-03-20 ENCOUNTER — ANESTHESIA (OUTPATIENT)
Dept: SURGERY | Facility: HOSPITAL | Age: 59
End: 2024-03-20
Payer: MEDICAID

## 2024-03-20 DIAGNOSIS — Z01.818 PRE-OP TESTING: Primary | ICD-10-CM

## 2024-03-20 DIAGNOSIS — G89.18 POST-OPERATIVE PAIN: ICD-10-CM

## 2024-03-20 DIAGNOSIS — K57.32 SIGMOID DIVERTICULITIS: ICD-10-CM

## 2024-03-20 PROBLEM — E11.65 TYPE 2 DIABETES MELLITUS WITH HYPERGLYCEMIA, WITHOUT LONG-TERM CURRENT USE OF INSULIN (HCC): Status: ACTIVE | Noted: 2023-06-05

## 2024-03-20 PROBLEM — K57.92 DIVERTICULITIS: Status: ACTIVE | Noted: 2024-03-20

## 2024-03-20 LAB
ANTIBODY SCREEN: NEGATIVE
GLUCOSE BLD-MCNC: 172 MG/DL (ref 70–99)
GLUCOSE BLD-MCNC: 198 MG/DL (ref 70–99)
GLUCOSE BLD-MCNC: 201 MG/DL (ref 70–99)
GLUCOSE BLD-MCNC: 99 MG/DL (ref 70–99)
RH BLOOD TYPE: POSITIVE

## 2024-03-20 PROCEDURE — 44207 L COLECTOMY/COLOPROCTOSTOMY: CPT

## 2024-03-20 PROCEDURE — 99223 1ST HOSP IP/OBS HIGH 75: CPT | Performed by: INTERNAL MEDICINE

## 2024-03-20 PROCEDURE — 0DTN4ZZ RESECTION OF SIGMOID COLON, PERCUTANEOUS ENDOSCOPIC APPROACH: ICD-10-PCS | Performed by: STUDENT IN AN ORGANIZED HEALTH CARE EDUCATION/TRAINING PROGRAM

## 2024-03-20 PROCEDURE — 44207 L COLECTOMY/COLOPROCTOSTOMY: CPT | Performed by: STUDENT IN AN ORGANIZED HEALTH CARE EDUCATION/TRAINING PROGRAM

## 2024-03-20 PROCEDURE — 8E0W4CZ ROBOTIC ASSISTED PROCEDURE OF TRUNK REGION, PERCUTANEOUS ENDOSCOPIC APPROACH: ICD-10-PCS | Performed by: STUDENT IN AN ORGANIZED HEALTH CARE EDUCATION/TRAINING PROGRAM

## 2024-03-20 PROCEDURE — 76942 ECHO GUIDE FOR BIOPSY: CPT | Performed by: ANESTHESIOLOGY

## 2024-03-20 RX ORDER — FAMOTIDINE 20 MG/1
20 TABLET, FILM COATED ORAL 2 TIMES DAILY
Status: DISCONTINUED | OUTPATIENT
Start: 2024-03-20 | End: 2024-03-23

## 2024-03-20 RX ORDER — OXYCODONE HYDROCHLORIDE 10 MG/1
10 TABLET ORAL EVERY 4 HOURS PRN
Status: DISCONTINUED | OUTPATIENT
Start: 2024-03-20 | End: 2024-03-23

## 2024-03-20 RX ORDER — MEPERIDINE HYDROCHLORIDE 25 MG/ML
12.5 INJECTION INTRAMUSCULAR; INTRAVENOUS; SUBCUTANEOUS AS NEEDED
Status: DISCONTINUED | OUTPATIENT
Start: 2024-03-20 | End: 2024-03-20 | Stop reason: HOSPADM

## 2024-03-20 RX ORDER — SODIUM CHLORIDE 9 MG/ML
INJECTION, SOLUTION INTRAVENOUS CONTINUOUS
Status: DISCONTINUED | OUTPATIENT
Start: 2024-03-20 | End: 2024-03-20

## 2024-03-20 RX ORDER — MIDAZOLAM HYDROCHLORIDE 1 MG/ML
INJECTION INTRAMUSCULAR; INTRAVENOUS AS NEEDED
Status: DISCONTINUED | OUTPATIENT
Start: 2024-03-20 | End: 2024-03-20 | Stop reason: SURG

## 2024-03-20 RX ORDER — FAMOTIDINE 10 MG/ML
20 INJECTION, SOLUTION INTRAVENOUS 2 TIMES DAILY
Status: DISCONTINUED | OUTPATIENT
Start: 2024-03-20 | End: 2024-03-23

## 2024-03-20 RX ORDER — NICOTINE POLACRILEX 4 MG
15 LOZENGE BUCCAL
Status: DISCONTINUED | OUTPATIENT
Start: 2024-03-20 | End: 2024-03-20 | Stop reason: HOSPADM

## 2024-03-20 RX ORDER — ACETAMINOPHEN 500 MG
1000 TABLET ORAL ONCE
Status: COMPLETED | OUTPATIENT
Start: 2024-03-20 | End: 2024-03-20

## 2024-03-20 RX ORDER — KETOROLAC TROMETHAMINE 30 MG/ML
15 INJECTION, SOLUTION INTRAMUSCULAR; INTRAVENOUS EVERY 6 HOURS
Status: COMPLETED | OUTPATIENT
Start: 2024-03-20 | End: 2024-03-21

## 2024-03-20 RX ORDER — HEPARIN SODIUM 5000 [USP'U]/ML
5000 INJECTION, SOLUTION INTRAVENOUS; SUBCUTANEOUS EVERY 8 HOURS SCHEDULED
Status: DISCONTINUED | OUTPATIENT
Start: 2024-03-21 | End: 2024-03-23

## 2024-03-20 RX ORDER — INSULIN ASPART 100 [IU]/ML
INJECTION, SOLUTION INTRAVENOUS; SUBCUTANEOUS ONCE
Status: DISCONTINUED | OUTPATIENT
Start: 2024-03-20 | End: 2024-03-20 | Stop reason: HOSPADM

## 2024-03-20 RX ORDER — HYDROMORPHONE HYDROCHLORIDE 1 MG/ML
0.6 INJECTION, SOLUTION INTRAMUSCULAR; INTRAVENOUS; SUBCUTANEOUS EVERY 5 MIN PRN
Status: DISCONTINUED | OUTPATIENT
Start: 2024-03-20 | End: 2024-03-20 | Stop reason: HOSPADM

## 2024-03-20 RX ORDER — METRONIDAZOLE 500 MG/100ML
500 INJECTION, SOLUTION INTRAVENOUS ONCE
Status: COMPLETED | OUTPATIENT
Start: 2024-03-20 | End: 2024-03-20

## 2024-03-20 RX ORDER — GLYCOPYRROLATE 0.2 MG/ML
INJECTION, SOLUTION INTRAMUSCULAR; INTRAVENOUS AS NEEDED
Status: DISCONTINUED | OUTPATIENT
Start: 2024-03-20 | End: 2024-03-20 | Stop reason: SURG

## 2024-03-20 RX ORDER — CEFTRIAXONE 2 G/1
INJECTION, POWDER, FOR SOLUTION INTRAMUSCULAR; INTRAVENOUS
Status: DISPENSED
Start: 2024-03-20 | End: 2024-03-21

## 2024-03-20 RX ORDER — ACETAMINOPHEN 10 MG/ML
INJECTION, SOLUTION INTRAVENOUS
Status: COMPLETED
Start: 2024-03-20 | End: 2024-03-20

## 2024-03-20 RX ORDER — DEXTROSE MONOHYDRATE 25 G/50ML
50 INJECTION, SOLUTION INTRAVENOUS
Status: DISCONTINUED | OUTPATIENT
Start: 2024-03-20 | End: 2024-03-20 | Stop reason: HOSPADM

## 2024-03-20 RX ORDER — NEOSTIGMINE METHYLSULFATE 1 MG/ML
INJECTION, SOLUTION INTRAVENOUS AS NEEDED
Status: DISCONTINUED | OUTPATIENT
Start: 2024-03-20 | End: 2024-03-20 | Stop reason: SURG

## 2024-03-20 RX ORDER — SODIUM CHLORIDE, SODIUM LACTATE, POTASSIUM CHLORIDE, CALCIUM CHLORIDE 600; 310; 30; 20 MG/100ML; MG/100ML; MG/100ML; MG/100ML
INJECTION, SOLUTION INTRAVENOUS CONTINUOUS PRN
Status: DISCONTINUED | OUTPATIENT
Start: 2024-03-20 | End: 2024-03-20 | Stop reason: SURG

## 2024-03-20 RX ORDER — LIDOCAINE HYDROCHLORIDE 10 MG/ML
INJECTION, SOLUTION EPIDURAL; INFILTRATION; INTRACAUDAL; PERINEURAL AS NEEDED
Status: DISCONTINUED | OUTPATIENT
Start: 2024-03-20 | End: 2024-03-20 | Stop reason: SURG

## 2024-03-20 RX ORDER — SODIUM CHLORIDE, SODIUM LACTATE, POTASSIUM CHLORIDE, CALCIUM CHLORIDE 600; 310; 30; 20 MG/100ML; MG/100ML; MG/100ML; MG/100ML
INJECTION, SOLUTION INTRAVENOUS CONTINUOUS
Status: DISCONTINUED | OUTPATIENT
Start: 2024-03-20 | End: 2024-03-20 | Stop reason: HOSPADM

## 2024-03-20 RX ORDER — ONDANSETRON 2 MG/ML
INJECTION INTRAMUSCULAR; INTRAVENOUS AS NEEDED
Status: DISCONTINUED | OUTPATIENT
Start: 2024-03-20 | End: 2024-03-20 | Stop reason: SURG

## 2024-03-20 RX ORDER — ZOLPIDEM TARTRATE 10 MG/1
10 TABLET ORAL NIGHTLY PRN
Status: DISCONTINUED | OUTPATIENT
Start: 2024-03-20 | End: 2024-03-23

## 2024-03-20 RX ORDER — LABETALOL HYDROCHLORIDE 5 MG/ML
5 INJECTION, SOLUTION INTRAVENOUS EVERY 5 MIN PRN
Status: DISCONTINUED | OUTPATIENT
Start: 2024-03-20 | End: 2024-03-20 | Stop reason: HOSPADM

## 2024-03-20 RX ORDER — METRONIDAZOLE 500 MG/100ML
INJECTION, SOLUTION INTRAVENOUS
Status: DISPENSED
Start: 2024-03-20 | End: 2024-03-21

## 2024-03-20 RX ORDER — HEPARIN SODIUM 5000 [USP'U]/ML
5000 INJECTION, SOLUTION INTRAVENOUS; SUBCUTANEOUS ONCE
Status: COMPLETED | OUTPATIENT
Start: 2024-03-20 | End: 2024-03-20

## 2024-03-20 RX ORDER — DEXAMETHASONE SODIUM PHOSPHATE 10 MG/ML
INJECTION, SOLUTION INTRAMUSCULAR; INTRAVENOUS AS NEEDED
Status: DISCONTINUED | OUTPATIENT
Start: 2024-03-20 | End: 2024-03-20 | Stop reason: SURG

## 2024-03-20 RX ORDER — HYDROMORPHONE HYDROCHLORIDE 1 MG/ML
0.2 INJECTION, SOLUTION INTRAMUSCULAR; INTRAVENOUS; SUBCUTANEOUS EVERY 5 MIN PRN
Status: DISCONTINUED | OUTPATIENT
Start: 2024-03-20 | End: 2024-03-20 | Stop reason: HOSPADM

## 2024-03-20 RX ORDER — MAGNESIUM OXIDE 400 MG/1
400 TABLET ORAL DAILY
Status: DISCONTINUED | OUTPATIENT
Start: 2024-03-20 | End: 2024-03-23

## 2024-03-20 RX ORDER — KETAMINE HYDROCHLORIDE 50 MG/ML
INJECTION, SOLUTION INTRAMUSCULAR; INTRAVENOUS AS NEEDED
Status: DISCONTINUED | OUTPATIENT
Start: 2024-03-20 | End: 2024-03-20 | Stop reason: SURG

## 2024-03-20 RX ORDER — BUPIVACAINE HYDROCHLORIDE 2.5 MG/ML
INJECTION, SOLUTION EPIDURAL; INFILTRATION; INTRACAUDAL AS NEEDED
Status: DISCONTINUED | OUTPATIENT
Start: 2024-03-20 | End: 2024-03-20 | Stop reason: HOSPADM

## 2024-03-20 RX ORDER — HYDROMORPHONE HYDROCHLORIDE 1 MG/ML
0.8 INJECTION, SOLUTION INTRAMUSCULAR; INTRAVENOUS; SUBCUTANEOUS EVERY 2 HOUR PRN
Status: DISCONTINUED | OUTPATIENT
Start: 2024-03-20 | End: 2024-03-23

## 2024-03-20 RX ORDER — ALBUTEROL SULFATE 90 UG/1
2 AEROSOL, METERED RESPIRATORY (INHALATION) 4 TIMES DAILY PRN
Status: DISCONTINUED | OUTPATIENT
Start: 2024-03-20 | End: 2024-03-23

## 2024-03-20 RX ORDER — 0.9 % SODIUM CHLORIDE 0.9 %
INTRAVENOUS SOLUTION INTRAVENOUS
Status: COMPLETED
Start: 2024-03-20 | End: 2024-03-20

## 2024-03-20 RX ORDER — DEXAMETHASONE SODIUM PHOSPHATE 4 MG/ML
VIAL (ML) INJECTION AS NEEDED
Status: DISCONTINUED | OUTPATIENT
Start: 2024-03-20 | End: 2024-03-20 | Stop reason: SURG

## 2024-03-20 RX ORDER — NICOTINE POLACRILEX 4 MG
30 LOZENGE BUCCAL
Status: DISCONTINUED | OUTPATIENT
Start: 2024-03-20 | End: 2024-03-23

## 2024-03-20 RX ORDER — ONDANSETRON 2 MG/ML
4 INJECTION INTRAMUSCULAR; INTRAVENOUS EVERY 6 HOURS PRN
Status: DISCONTINUED | OUTPATIENT
Start: 2024-03-20 | End: 2024-03-20 | Stop reason: HOSPADM

## 2024-03-20 RX ORDER — INDOCYANINE GREEN AND WATER 25 MG
KIT INJECTION AS NEEDED
Status: DISCONTINUED | OUTPATIENT
Start: 2024-03-20 | End: 2024-03-20 | Stop reason: SURG

## 2024-03-20 RX ORDER — ONDANSETRON 2 MG/ML
4 INJECTION INTRAMUSCULAR; INTRAVENOUS EVERY 6 HOURS PRN
Status: DISCONTINUED | OUTPATIENT
Start: 2024-03-20 | End: 2024-03-23

## 2024-03-20 RX ORDER — HYDROMORPHONE HYDROCHLORIDE 1 MG/ML
INJECTION, SOLUTION INTRAMUSCULAR; INTRAVENOUS; SUBCUTANEOUS
Status: COMPLETED
Start: 2024-03-20 | End: 2024-03-20

## 2024-03-20 RX ORDER — NICOTINE POLACRILEX 4 MG
15 LOZENGE BUCCAL
Status: DISCONTINUED | OUTPATIENT
Start: 2024-03-20 | End: 2024-03-23

## 2024-03-20 RX ORDER — PROCHLORPERAZINE EDISYLATE 5 MG/ML
5 INJECTION INTRAMUSCULAR; INTRAVENOUS EVERY 8 HOURS PRN
Status: DISCONTINUED | OUTPATIENT
Start: 2024-03-20 | End: 2024-03-20 | Stop reason: HOSPADM

## 2024-03-20 RX ORDER — ROPIVACAINE HYDROCHLORIDE 5 MG/ML
INJECTION, SOLUTION EPIDURAL; INFILTRATION; PERINEURAL AS NEEDED
Status: DISCONTINUED | OUTPATIENT
Start: 2024-03-20 | End: 2024-03-20 | Stop reason: SURG

## 2024-03-20 RX ORDER — ROCURONIUM BROMIDE 10 MG/ML
INJECTION, SOLUTION INTRAVENOUS AS NEEDED
Status: DISCONTINUED | OUTPATIENT
Start: 2024-03-20 | End: 2024-03-20 | Stop reason: SURG

## 2024-03-20 RX ORDER — TRIAMTERENE AND HYDROCHLOROTHIAZIDE 37.5; 25 MG/1; MG/1
1 CAPSULE ORAL NIGHTLY
Status: DISCONTINUED | OUTPATIENT
Start: 2024-03-20 | End: 2024-03-23

## 2024-03-20 RX ORDER — PROCHLORPERAZINE EDISYLATE 5 MG/ML
5 INJECTION INTRAMUSCULAR; INTRAVENOUS EVERY 8 HOURS PRN
Status: DISCONTINUED | OUTPATIENT
Start: 2024-03-20 | End: 2024-03-23

## 2024-03-20 RX ORDER — DIPHENHYDRAMINE HYDROCHLORIDE 50 MG/ML
12.5 INJECTION INTRAMUSCULAR; INTRAVENOUS AS NEEDED
Status: DISCONTINUED | OUTPATIENT
Start: 2024-03-20 | End: 2024-03-20 | Stop reason: HOSPADM

## 2024-03-20 RX ORDER — HYDROMORPHONE HYDROCHLORIDE 1 MG/ML
0.4 INJECTION, SOLUTION INTRAMUSCULAR; INTRAVENOUS; SUBCUTANEOUS EVERY 2 HOUR PRN
Status: DISCONTINUED | OUTPATIENT
Start: 2024-03-20 | End: 2024-03-23

## 2024-03-20 RX ORDER — SODIUM CHLORIDE, SODIUM LACTATE, POTASSIUM CHLORIDE, CALCIUM CHLORIDE 600; 310; 30; 20 MG/100ML; MG/100ML; MG/100ML; MG/100ML
100 INJECTION, SOLUTION INTRAVENOUS CONTINUOUS
Status: DISCONTINUED | OUTPATIENT
Start: 2024-03-20 | End: 2024-03-22

## 2024-03-20 RX ORDER — HEPARIN SODIUM 5000 [USP'U]/ML
INJECTION, SOLUTION INTRAVENOUS; SUBCUTANEOUS
Status: COMPLETED
Start: 2024-03-20 | End: 2024-03-20

## 2024-03-20 RX ORDER — ACETAMINOPHEN 10 MG/ML
1000 INJECTION, SOLUTION INTRAVENOUS EVERY 6 HOURS
Status: COMPLETED | OUTPATIENT
Start: 2024-03-20 | End: 2024-03-21

## 2024-03-20 RX ORDER — NICOTINE POLACRILEX 4 MG
30 LOZENGE BUCCAL
Status: DISCONTINUED | OUTPATIENT
Start: 2024-03-20 | End: 2024-03-20 | Stop reason: HOSPADM

## 2024-03-20 RX ORDER — DEXTROSE MONOHYDRATE 25 G/50ML
50 INJECTION, SOLUTION INTRAVENOUS
Status: DISCONTINUED | OUTPATIENT
Start: 2024-03-20 | End: 2024-03-23

## 2024-03-20 RX ORDER — IBUPROFEN 600 MG/1
600 TABLET ORAL EVERY 6 HOURS SCHEDULED
Status: DISCONTINUED | OUTPATIENT
Start: 2024-03-21 | End: 2024-03-23

## 2024-03-20 RX ORDER — NALOXONE HYDROCHLORIDE 0.4 MG/ML
0.08 INJECTION, SOLUTION INTRAMUSCULAR; INTRAVENOUS; SUBCUTANEOUS AS NEEDED
Status: DISCONTINUED | OUTPATIENT
Start: 2024-03-20 | End: 2024-03-20 | Stop reason: HOSPADM

## 2024-03-20 RX ORDER — LABETALOL HYDROCHLORIDE 5 MG/ML
INJECTION, SOLUTION INTRAVENOUS AS NEEDED
Status: DISCONTINUED | OUTPATIENT
Start: 2024-03-20 | End: 2024-03-20 | Stop reason: SURG

## 2024-03-20 RX ORDER — ATORVASTATIN CALCIUM 80 MG/1
80 TABLET, FILM COATED ORAL NIGHTLY
Status: DISCONTINUED | OUTPATIENT
Start: 2024-03-20 | End: 2024-03-23

## 2024-03-20 RX ORDER — HYDROMORPHONE HYDROCHLORIDE 1 MG/ML
0.4 INJECTION, SOLUTION INTRAMUSCULAR; INTRAVENOUS; SUBCUTANEOUS EVERY 5 MIN PRN
Status: DISCONTINUED | OUTPATIENT
Start: 2024-03-20 | End: 2024-03-20 | Stop reason: HOSPADM

## 2024-03-20 RX ORDER — OXYCODONE HYDROCHLORIDE 5 MG/1
5 TABLET ORAL EVERY 4 HOURS PRN
Status: DISCONTINUED | OUTPATIENT
Start: 2024-03-20 | End: 2024-03-23

## 2024-03-20 RX ORDER — LIDOCAINE HYDROCHLORIDE ANHYDROUS AND DEXTROSE MONOHYDRATE .8; 5 G/100ML; G/100ML
INJECTION, SOLUTION INTRAVENOUS CONTINUOUS PRN
Status: DISCONTINUED | OUTPATIENT
Start: 2024-03-20 | End: 2024-03-20 | Stop reason: SURG

## 2024-03-20 RX ORDER — ACETAMINOPHEN 10 MG/ML
1000 INJECTION, SOLUTION INTRAVENOUS ONCE AS NEEDED
Status: COMPLETED | OUTPATIENT
Start: 2024-03-20 | End: 2024-03-20

## 2024-03-20 RX ADMIN — MIDAZOLAM HYDROCHLORIDE 2 MG: 1 INJECTION INTRAMUSCULAR; INTRAVENOUS at 13:48:00

## 2024-03-20 RX ADMIN — LIDOCAINE HYDROCHLORIDE ANHYDROUS AND DEXTROSE MONOHYDRATE 1.5 MG/KG/HR: .8; 5 INJECTION, SOLUTION INTRAVENOUS at 14:34:00

## 2024-03-20 RX ADMIN — NEOSTIGMINE METHYLSULFATE 4 MG: 1 INJECTION, SOLUTION INTRAVENOUS at 18:00:00

## 2024-03-20 RX ADMIN — SODIUM CHLORIDE: 9 INJECTION, SOLUTION INTRAVENOUS at 13:48:00

## 2024-03-20 RX ADMIN — ROPIVACAINE HYDROCHLORIDE 12.5 ML: 5 INJECTION, SOLUTION EPIDURAL; INFILTRATION; PERINEURAL at 14:07:00

## 2024-03-20 RX ADMIN — DEXAMETHASONE SODIUM PHOSPHATE 2 MG: 10 INJECTION, SOLUTION INTRAMUSCULAR; INTRAVENOUS at 14:07:00

## 2024-03-20 RX ADMIN — ROPIVACAINE HYDROCHLORIDE 12.5 ML: 5 INJECTION, SOLUTION EPIDURAL; INFILTRATION; PERINEURAL at 14:01:00

## 2024-03-20 RX ADMIN — GLYCOPYRROLATE 0.6 MG: 0.2 INJECTION, SOLUTION INTRAMUSCULAR; INTRAVENOUS at 18:00:00

## 2024-03-20 RX ADMIN — INDOCYANINE GREEN AND WATER 7.5 MG: 25 MG KIT INJECTION at 16:11:00

## 2024-03-20 RX ADMIN — INDOCYANINE GREEN AND WATER 7.5 MG: 25 MG KIT INJECTION at 17:33:00

## 2024-03-20 RX ADMIN — ROCURONIUM BROMIDE 10 MG: 10 INJECTION, SOLUTION INTRAVENOUS at 14:23:00

## 2024-03-20 RX ADMIN — LABETALOL HYDROCHLORIDE 5 MG: 5 INJECTION, SOLUTION INTRAVENOUS at 15:10:00

## 2024-03-20 RX ADMIN — ROCURONIUM BROMIDE 10 MG: 10 INJECTION, SOLUTION INTRAVENOUS at 15:24:00

## 2024-03-20 RX ADMIN — ONDANSETRON 4 MG: 2 INJECTION INTRAMUSCULAR; INTRAVENOUS at 17:39:00

## 2024-03-20 RX ADMIN — METRONIDAZOLE 500 MG: 500 INJECTION, SOLUTION INTRAVENOUS at 14:17:00

## 2024-03-20 RX ADMIN — LABETALOL HYDROCHLORIDE 10 MG: 5 INJECTION, SOLUTION INTRAVENOUS at 15:04:00

## 2024-03-20 RX ADMIN — SODIUM CHLORIDE, SODIUM LACTATE, POTASSIUM CHLORIDE, CALCIUM CHLORIDE: 600; 310; 30; 20 INJECTION, SOLUTION INTRAVENOUS at 16:25:00

## 2024-03-20 RX ADMIN — ROCURONIUM BROMIDE 50 MG: 10 INJECTION, SOLUTION INTRAVENOUS at 13:55:00

## 2024-03-20 RX ADMIN — DEXAMETHASONE SODIUM PHOSPHATE 4 MG: 4 MG/ML VIAL (ML) INJECTION at 15:09:00

## 2024-03-20 RX ADMIN — LABETALOL HYDROCHLORIDE 10 MG: 5 INJECTION, SOLUTION INTRAVENOUS at 18:14:00

## 2024-03-20 RX ADMIN — LIDOCAINE HYDROCHLORIDE 25 MG: 10 INJECTION, SOLUTION EPIDURAL; INFILTRATION; INTRACAUDAL; PERINEURAL at 13:55:00

## 2024-03-20 RX ADMIN — LABETALOL HYDROCHLORIDE 5 MG: 5 INJECTION, SOLUTION INTRAVENOUS at 17:53:00

## 2024-03-20 RX ADMIN — DEXAMETHASONE SODIUM PHOSPHATE 2 MG: 10 INJECTION, SOLUTION INTRAMUSCULAR; INTRAVENOUS at 14:01:00

## 2024-03-20 RX ADMIN — KETAMINE HYDROCHLORIDE 50 MG: 50 INJECTION, SOLUTION INTRAMUSCULAR; INTRAVENOUS at 14:09:00

## 2024-03-20 NOTE — ANESTHESIA PREPROCEDURE EVALUATION
PRE-OP EVALUATION    Patient Name: Sandra Roche    Admit Diagnosis: Sigmoid diverticulitis [K57.32]    Pre-op Diagnosis: Sigmoid diverticulitis [K57.32]    ROBOTIC LOW ANTERIOR COLON RESECTION, POSSIBLE OSTOMY, POSSIBLE OPEN    Anesthesia Procedure: ROBOTIC LOW ANTERIOR COLON RESECTION, POSSIBLE OSTOMY, POSSIBLE OPEN    Surgeon(s) and Role:     * Paulo Kearney MD - Primary    Pre-op vitals reviewed.        Body mass index is 27.72 kg/m².    Current medications reviewed.  Hospital Medications:  No current facility-administered medications on file as of .       Outpatient Medications:     No medications prior to admission.       Allergies: Seasonal      Anesthesia Evaluation    Patient summary reviewed.    Anesthetic Complications  (-) history of anesthetic complications         GI/Hepatic/Renal      (+) GERD                           Cardiovascular      ECG reviewed.            (+) hypertension                                     Endo/Other      (+) diabetes and well controlled,                    (+) arthritis       Pulmonary    Negative pulmonary ROS.                       Neuro/Psych          (+) CVA and no residual deficits                           Past Surgical History:   Procedure Laterality Date    COLONOSCOPY N/A 9/7/2023    Procedure: COLONOSCOPY;  Surgeon: Evelyn Coles MD;  Location:  ENDOSCOPY    EXPLORATORY OF ABDOMEN      KNEE REPLACEMENT SURGERY       Social History     Socioeconomic History    Marital status: Legally    Tobacco Use    Smoking status: Every Day     Packs/day: .1     Types: Cigarettes    Smokeless tobacco: Never   Vaping Use    Vaping Use: Never used   Substance and Sexual Activity    Alcohol use: Yes     Comment: occasional    Drug use: Never     History   Drug Use Unknown     Available pre-op labs reviewed.  Lab Results   Component Value Date    WBC 11.2 (H) 03/18/2024    RBC 4.45 03/18/2024    HGB 11.9 (L) 03/18/2024    HCT 38.2 03/18/2024    MCV 85.8  03/18/2024    MCH 26.7 03/18/2024    MCHC 31.2 03/18/2024    RDW 13.4 03/18/2024    .0 03/18/2024     Lab Results   Component Value Date     03/04/2024    K 4.1 03/04/2024     03/04/2024    CO2 23.0 03/04/2024    BUN 12 03/04/2024    CREATSERUM 0.85 03/04/2024     (H) 03/04/2024    CA 9.8 03/04/2024            Airway      Mallampati: II  Mouth opening: >3 FB  TM distance: 4 - 6 cm  Neck ROM: full Cardiovascular      Rhythm: regular  Rate: normal     Dental      Dental appliance(s): lower dentures and upper dentures       Pulmonary    Pulmonary exam normal.                 Other findings              ASA: 3   Plan: general  NPO status verified and     Post-procedure pain management plan discussed with surgeon and patient.      Plan/risks discussed with: patient, spouse and child/children  Use of blood product(s) discussed with: patient, spouse and child/children    Consented to blood products.          Present on Admission:  **None**

## 2024-03-20 NOTE — ANESTHESIA PROCEDURE NOTES
Regional Block    Date/Time: 3/20/2024 1:59 PM    Performed by: Annabel Peterson MD  Authorized by: Annabel Peterson MD      General Information and Staff    Start Time:  3/20/2024 1:59 PM  End Time:  3/20/2024 4:06 AM  Anesthesiologist:  Annabel Peterson MD  Performed by:  Anesthesiologist  Patient Location:  OR    Block Placement: Post Induction  Site Identification: real time ultrasound guided, nerve stimulator, surface landmarks and image stored and retrievable    Block site/laterality marked before start: site marked  Reason for Block: at surgeon's request, post-op pain management and procedure for pain    Preanesthetic Checklist: 2 patient identifers, IV checked, risks and benefits discussed, monitors and equipment checked, pre-op evaluation, timeout performed, anesthesia consent, sterile technique used, no prohibitive neurological deficits and no local skin infection at insertion site      Procedure Details    Patient Position:  Supine  Prep: ChloraPrep    Monitoring:  Cardiac monitor, continuous pulse ox and blood pressure cuff  Block Type:  TAP  Laterality:  Bilateral  Injection Technique:  Single-shot    Needle    Needle Type:  Short-bevel and echogenic  Needle Gauge:  21 G  Needle Length:  100 mm  Needle Localization:  Ultrasound guidance  Reason for Ultrasound Use: appropriate spread of the medication was noted in real time and no ultrasound evidence of intravascular and/or intraneural injection          Needle Insertion Depth:  3    Assessment    Injection Assessment:  Good spread noted, negative resistance, negative aspiration for heme, incremental injection and low pressure  Heart Rate Change: No    - Patient tolerated block procedure well without evidence of immediate block related complications.     Medications  3/20/2024 1:59 PM      Additional Comments    Medication:  Ropivacaine 0.25% 25mL, decadron 2mg PF per side

## 2024-03-20 NOTE — OPERATIVE REPORT
[facility]  Operative Note    Sandra Roche Location: OR   CSN [id number] MRN [id number]    1965 Age 59 year old   Admission Date 3/20/2024 Operation Date 3/20/2024   Attending Physician Paulo Kearney MD Operating Physician Paulo Kearney MD   PCP Mariaelena Valle DO          Patient Name: Sandra Roche    Preoperative Diagnosis: Sigmoid diverticulitis [K57.32]    Postoperative Diagnosis: Same as preoperative diagnosis    Primary Surgeon: Paulo Kearney MD    Assistant: Jayla DAVID, María Elena DAVID    Anesthesia: General    Procedures: Robotic low anterior colon resection    Implants: None    Specimen: Sigmoid colon, partial rectum    Drains: 19 Chinese Juan drain    Estimated Blood Loss: 75 cc    Complications: None immediate    Condition: Stable    Indications for Surgery:   This is a very nice 59-year-old female previously under the care of of Dr. Coles in regards to her history of recurrent/smoldering diverticulitis.  Patient subsequently established care with me.     Patient began having issues with diverticulitis in 2023.  She was admitted to the hospital on 2023 and improved with IV antibiotics.  Dr. Coles was able to perform a colonoscopy on the patient on 2023 which revealed a tortuous colon, multiple widemouth diverticula of the sigmoid and descending colon without any polyps or lesions.  Patient last followed up with Dr. Coles on 1/10/2024.     Over the last 2+ months, patient's had persistent left lower quadrant abdominal pain.  She also has constipation that she is controlling with MiraLAX and stool softeners.  She has had 2 recent CT scans on 2023 and 2024.  Both of these scans showed diverticulosis without diverticulitis.  No recent fevers, nausea or vomiting.  Dr. Coles prescribed her a course of Norco for pain control and was tentatively scheduling her for robotic low anterior colon resection.  Patient's had a history of a laparoscopic procedure  for endometriosis.  She does not take any blood thinners.  She is currently unemployed.     I have personally reviewed the imaging of patient's 3 most recent CT scans.  I showed the imaging to the patient.  Though there is no obvious signs of inflammation around the sigmoid colon in the last 2 CT scans, there is some subtle thickening of the wall of the colon in this region suggestive of possible chronic diverticulitis or mild stricture.    I discussed treatment options with the patient.  She has failed to improve with antibiotic therapy and bowel regimen.  She continues to have life-limiting left lower quadrant pain and I suspect she has had some degree of chronic/smoldering diverticulitis based on her symptoms and recent CT scans.  Therefore, I did offer her elective robotic low anterior colon resection, possible open, possible ostomy.     The details of surgery were discussed including the expected recovery time, risks, benefits and alternatives. Specific risks of surgery that were discussed included but not limited to pain, bleeding, infection, bowel or ureteral injury, and anastomotic complications such as leak, bleeding or stricture. There is also a risk of possible ostomy creation at the time of surgery. Patients are generally hospitalized for 2-5 days after this type of surgery.  I advise no lifting more than 10-15 pounds for total of 6 weeks after surgery.  Patient would remain on a soft/low fiber diet for 4 to 6 weeks after surgery.  She should complete a bowel prep with oral antibiotics per ERAS protocol prior to surgery.      Patient expressed understanding and wished to move forward with scheduling surgery.  She presents for elective surgery today.  Consent was signed.  All questions answered.    Surgical Findings:   Omental adhesions to the pelvic peritoneum and left side of the abdominal wall.  Mild chronic inflammation of the sigmoid colon that was adherent to the left and anterior pelvic peritoneum,  left ovary and fallopian tube.  Patent, non-bleeding, well-vascularized, tension-free colorectal anastomosis with negative intraoperative leak test      Description of Procedure:   Patient was brought to the operating room and positioned supine on the OR table on a pink foam pad. Bilateral sequential compression devices were placed. Preoperative antibiotics were given. Patient was induced under general endotracheal anesthesia. Patient was positioned in lithotomy.  Both arms were carefully tucked with all pressure points well-padded.  A rectal tube was placed for intraoperative rectal irrigation.  A Cowan catheter was placed using sterile technique. The abdomen was prepped and draped in the usual sterile fashion.  A timeout was performed.     Pneumoperitoneum was established using a Veress needle through a stab incision at Wolf's point.  There was appropriately low opening pressure.  An 8 mm robotic trocar was inserted under direct laparoscopic vision just above and to the right of the umbilicus.  The Veress needle was seen within the omentum which was adherent to the left upper quadrant abdominal wall.  An additional 8 mm robotic trocar was placed under direct vision in the epigastrium.  I used cautery scissors to take down the omental adhesions in the left upper quadrant.  There was no evidence of underlying visceral injury from the Veress needle.  The Veress needle was then removed.  An additional 8 mm robotic trocar was placed under direct vision in the left upper quadrant.  There was a single adhesive band tethering the cecum to the right lower quadrant peritoneum.  This was divided using cauterized scissors. A 12 mm right lower quadrant robotic trocar was placed under direct vision.  A 5 mm airseal laparoscopic assistant trocar was placed under direct vision in the right lateral abdomen.       The abdomen was explored laparoscopically.  The liver surface, omentum, bowel and peritoneal surfaces appeared  normal.  There were some residual omental adhesions in the left upper quadrant and pelvis.  The da Marlon XI robotic platform was brought into the field and docked. The patient was positioned in slight Trendelenburg and left side up.  I took down all the omental adhesions to the abdominal wall and pelvis using hook electrocautery.    I then began with lateral to medial mobilization of the descending colon.  The white line of Toldt was sharply incised with hook electrocautery.  The descending colon was freed from its retroperitoneal attachments in a lateral to medial fashion using a combination of sharp and blunt dissection. This dissection continued until the descending colon appeared sufficiently mobilized. Any small vessel bleeding was controlled with electrocautery.     We then positioned the patient in steep Trendelenburg with left side up. We continued with lateral to medial mobilization of the descending and sigmoid colon using a combination of blunt and sharp dissection with hook electrocautery.    Once again, the sigmoid colon was scarred to the anterior and left anterior pelvic peritoneum in addition to the left ovary and fallopian tube.  Eventually, the sigmoid colon was  from the pelvic peritoneum and pelvic organs using careful, tedious blunt and sharp dissection with hook electrocautery.  The left ureter and iliac vessels were identified and preserved throughout the dissection.     The rectosigmoid colon was then retracted anteriorly and cephalad out of the pelvis.  The peritoneum was scored along the reflection between the mesorectum and retroperitoneum. The dissection was continued from the right side then proceeded posteriorly then into the left side along the areolar avascular TME plane.  We were able to rendezvous with our previous lateral to medial dissection plane.  The distal sigmoid colon and proximal rectum was then sufficiently mobilized.     A distal transection point was identified  at the rectosigmoid junction. The mesentery was scored up to this plan transection site then divided with a vessel sealer.  The colon was divided using a two fires of a robotic 60 mm CARLENE stapler with blue load. The rectal stump was copiously irrigated with saline solution until the effluent was clear.  No leaks were identified from the rectal stump staple line.   The rectal irrigation tube was then removed. The mesentery of the sigmoid colon was divided using the vessel sealer.  The stapled distal edge of the colon specimen was grasped with a locking grasper.  The da Marlon Xi robotic platform was undocked.      A small Pfannenstiel skin incision was made.  The skin incision was deepened down through the subcutaneous fat and anterior fascia using electrocautery.  The anterior fascia was mobilized off the rectus abdominis muscle using electrocautery.   The rectus muscles and peritoneum were divided in the midline with electrocautery at the cephalad portion of the incision and carried down caudad taking care not to injure the bladder.  The colon specimen was extracorporealized.  We identified our proximal transection point at the descending-sigmoid junction where the colon appeared pink and free of inflammation. The mesentery was divided up to our proximal transection point using a LigaSure device.  The colon was then cut and the specimen was passed off the field to be sent to pathology.     A 2-0 Prolene pursestring suture was placed into the proximal colon conduit. The colon conduit was rather large and able to accommodate a 29 mm EEA sizer with ease.  Therefore, the anvil of a 29 mm EEA was used and secured in place using the 2-0 Prolene pursestring suture. The anvil was further secured using a 0 Vicryl tie.  The fat overlying the staple line was carefully cleared off of the serosa using electrocautery.  The proximal colon with anvil in place was then placed back into the abdomen.        The operative team then  changed gown and gloves. Pneumoperitoneum was re-established. The anus was dilated up to 2 fingers and I was able to pass up a 29 mm EEA sizer to the staple line of the rectal stump.  This was followed by the EEA stapler. The spike was brought out just anterior to the mid staple line.  The anvil was connected to the spike ensuring there was no twisting of the proximal colon conduit, the anvil and spike were brought together, and a stapled anastomosis was performed. The EEA stapler was removed with ease. There were 2 robust, intact donuts seen within the stapler.  The proximal colon was clamped and the pelvis was filled with warm saline solution.  A leak test was performed using a rigid proctoscope and the rectum insufflated until there was passage of air through the anus.  No bubbles were seen.  The proctoscope was removed as insufflation was withdrawn.  Perfusion to the rectal stump and colon conduit was checked using ICG and the firefly camera. Both appeared well-vascularized.     The abdomen and pelvis were thoroughly irrigated and suctioned and appeared hemostatic. The fascia at the 12 mm trocar site was closed using a single interrupted 0 PDS suture with the assistance of a Salvador-Henna device.  Prior to tying down the fascial suture, a 19 [place] Juan drain was positioned through the 12 mm trocar site and positioned in the pelvis.  The patient was leveled.  The fascial suture was tied. The drain was secured in place using a 2-0 nylon suture.  The peritoneum within the Pfannenstiel incision was closed using a running #1 Vicryl suture. The fascia was closed using a running 0 PDS suture. Each site appeared hemostatic.  The skin at each incision was closed using 4-0 Monocryl in a subcuticular fashion.  10 cc of 0.25% Marcaine was injected around the Pfannenstiel incision for local anesthesia.  The skin was cleaned and dried and skin glue was placed as a final dressing.  A drain sponge was placed around the  Juan drain and secured in place with paper tape.       The patient was awakened from anesthesia, extubated and transported to the postanesthesia care unit in stable condition. All sponge, needle, and instrument counts were correct at the end of the case.  I was present for the entire case.      Paulo Kearney MD  3/20/2024  6:19 PM

## 2024-03-20 NOTE — ANESTHESIA PROCEDURE NOTES
Airway  Date/Time: 3/20/2024 1:57 PM  Urgency: elective      General Information and Staff    Patient location during procedure: OR  Anesthesiologist: Annabel Peterson MD  Performed: anesthesiologist   Performed by: Annabel Peterson MD  Authorized by: Annabel Peterson MD      Indications and Patient Condition  Indications for airway management: anesthesia  Spontaneous Ventilation: absent  Sedation level: deep  Preoxygenated: yes  Patient position: sniffing  Mask difficulty assessment: 0 - not attempted    Final Airway Details  Final airway type: endotracheal airway      Successful airway: ETT  Cuffed: yes   Successful intubation technique: direct laryngoscopy  Facilitating devices/methods: cricoid pressure  Endotracheal tube insertion site: oral  Blade: Belinda  Blade size: #3  ETT size (mm): 7.0    Cormack-Lehane Classification: grade I - full view of glottis  Placement verified by: capnometry   Cuff volume (mL): 7  Measured from: lips  ETT to lips (cm): 21  Number of attempts at approach: 1  Number of other approaches attempted: 0    Additional Comments  Smooth induction, smooth intubation, no trauma

## 2024-03-20 NOTE — ANESTHESIA PROCEDURE NOTES
Arterial Line    Date/Time: 3/20/2024 2:15 PM    Performed by: Annabel Peterson MD  Authorized by: Annabel Peterson MD    General Information and Staff    Procedure Start:  3/20/2024 2:15 PM  Procedure End:  3/20/2024 2:19 PM  Anesthesiologist:  Annabel Peterson MD  Performed By:  Anesthesiologist  Patient Location:  OR  Indication: continuous blood pressure monitoring and blood sampling needed    Site Identification: real time ultrasound guided and surface landmarks    Preanesthetic Checklist: 2 patient identifiers, IV checked, risks and benefits discussed, monitors and equipment checked, pre-op evaluation, timeout performed, anesthesia consent and sterile technique used    Procedure Details    Catheter Size:  20 G  Catheter Length:  1 and 3/4 inch  Catheter Type:  Arrow  Seldinger Technique?: Yes    Laterality:  Left  Site:  Radial artery  Site Prep: chlorhexidine    Line Secured:  Wrist Brace, tape and Tegaderm    Assessment    Events: patient tolerated procedure well with no complications      Medications  3/20/2024 2:15 PM      Additional Comments

## 2024-03-20 NOTE — ANESTHESIA PROCEDURE NOTES
Peripheral IV  Date/Time: 3/20/2024 2:07 PM  Inserted by: Annabel Peterson MD    Placement  Needle size: 18 G  Laterality: right  Location: wrist  Site prep: alcohol  Attempts: 1

## 2024-03-20 NOTE — DISCHARGE INSTRUCTIONS
Post-Surgical Discharge Instructions    Paulo Kearney MD    Pain: You may take acetaminophen (Tylenol) up to 650 mg every 6 hours as needed for mild-moderate pain. You may take ibuprofen (Motrin) up to 600 mg every 6 hours as needed for mild-moderate pain. Take narcotic pain medication as needed for severe pain per your prescription. Do not drive while taking narcotic pain medication. Many patients take a stool softener as narcotics are known to cause constipation. Okay to use ice packs over abdomen    Diet:  No restrictions.    Activity:  No heavy lifting greater than 10 lbs and no exercising for a total of 6 weeks from the date of surgery.  You may walk and climb stairs with moderation. If your abdomen is more uncomfortable than the day before, you need to be less active as you may be pulling on your stitches.    Bathing:  You may shower as often as you would like, but no submerging the incisions under water for a total of 2 weeks from the date of surgery.  This includes no swimming, no baths, and no hot-tubs.      Wound:  Keep the wound dry.  No dressing is necessary unless there is drainage coming from the wound.  If the drainage is excessive or looks like pus, please call our office.    Driving: You may drive provided that you are no longer taking your narcotic pain medication.      Bowel Function:  After surgery, your bowel movements will be irregular.  It is normal to have up to 3 bowel movements a day or as low as 1 bowel movement every 3 days.  Occasionally, your movements may be even more irregular than this.  As long as you are not vomiting or have a fever over 100.3, you don’t need to be overly concerned.      Return to Work:  Most patients return to work after 1-2 weeks from the date of their surgery.  You will still have a lifting restriction of no greater than 10 lbs from the date of your surgery until 6 weeks.  If your work requires heavy lifting, you will need to stay off work for 6 weeks.  When  you are ready to return to work, please call the office and we will send a work release to your employer.      Appointment: Please call our office for an appointment in 10-14 days after surgery, unless otherwise instructed.  This will allow ample time for the swelling and soreness to resolve before your wound is examined. If you have fevers, chills, or if you are concerned about your wound, please call us immediately at [phone].      Thank you for entrusting us with your care.

## 2024-03-20 NOTE — ANESTHESIA POSTPROCEDURE EVALUATION
[facility]    Sandra Roche Patient Status:  Inpatient   Age/Gender 59 year old female MRN [id number]   Location [facility] POST ANESTHESIA CARE UNIT Attending Paulo Kearney MD   Hosp Day # 0 PCP Mariaelena Valle DO       Anesthesia Post-op Note    ROBOTIC LOW ANTERIOR COLON RESECTION    Procedure Summary       Date: 03/20/24 Room / Location:  MAIN OR 08 /  MAIN OR    Anesthesia Start: 1348 Anesthesia Stop: 1824    Procedure: ROBOTIC LOW ANTERIOR COLON RESECTION (Abdomen) Diagnosis:       Sigmoid diverticulitis      (Sigmoid diverticulitis [K57.32])    Surgeons: Paulo Kearney MD Anesthesiologist: Annabel Peterson MD    Anesthesia Type: general ASA Status: 3            Anesthesia Type: general    Vitals Value Taken Time   /81 03/20/24 1838   Temp 98.7 03/20/24 1838   Pulse 67 03/20/24 1838   Resp 16 03/20/24 1838   SpO2 100 03/20/24 1838       Patient Location: PACU    Anesthesia Type: general    Airway Patency: patent and extubated    Postop Pain Control: adequate    Mental Status: preanesthetic baseline    Nausea/Vomiting: none    Cardiopulmonary/Hydration status: stable euvolemic    Complications: no apparent anesthesia related complications    Postop vital signs: stable    Dental Exam: Unchanged from Preop    Patient to be discharged from PACU when criteria met.

## 2024-03-20 NOTE — H&P
New Patient Visit Note       Active Problems      1. Pre-op testing        Chief Complaint   No chief complaint on file.      History of Present Illness   This is a very nice 59-year-old female previously under the care of of Dr. Coles in regards to her history of recurrent/smoldering diverticulitis.  Patient presents to clinic today to establish care with me.    Patient began having issues with diverticulitis in July 2023.  She was admitted to the hospital on 7/21/2023 and improved with IV antibiotics.  Dr. Coles was able to perform a colonoscopy on the patient on 9/7/2023 which revealed a tortuous colon, multiple widemouth diverticula of the sigmoid and descending colon without any polyps or lesions.  Patient last followed up with Dr. Coles on 1/10/2024.    Over the last 2+ months, patient's had persistent left lower quadrant abdominal pain.  She also has constipation that she is controlling with MiraLAX and stool softeners.  She has had 2 recent CT scans on 9/7/2023 and 1/25/2024.  Both of these scans showed diverticulosis without diverticulitis.  No recent fevers, nausea or vomiting.  Dr. Coles prescribed her a course of Norco for pain control and was tentatively scheduling her for robotic low anterior colon resection.  Patient's had a history of a laparoscopic procedure for endometriosis.  She does not take any blood thinners.  She is currently unemployed.      Allergies  Sandra is allergic to seasonal.    Past Medical / Surgical / Social / Family History    The past medical and past surgical history have been reviewed by me today.    Past Medical History:   Diagnosis Date    Diabetes (HCC)     Diverticulosis of large intestine     Esophageal reflux     Essential hypertension     High blood pressure     High cholesterol     Osteoarthritis     Pneumonia due to organism     Stroke (HCC)     2008    Visual impairment     READING GLASSES     Past Surgical History:   Procedure Laterality Date    COLONOSCOPY N/A  9/7/2023    Procedure: COLONOSCOPY;  Surgeon: Evelyn Coles MD;  Location:  ENDOSCOPY    EXPLORATORY OF ABDOMEN      KNEE REPLACEMENT SURGERY         The family history and social history have been reviewed by me today.    Family History   Problem Relation Age of Onset    Heart Disorder Father     Cancer Mother     Hypertension Mother      Social History     Socioeconomic History    Marital status: Legally    Tobacco Use    Smoking status: Every Day     Packs/day: .1     Types: Cigarettes    Smokeless tobacco: Never   Vaping Use    Vaping Use: Never used   Substance and Sexual Activity    Alcohol use: Yes     Comment: occasional    Drug use: Never      No current outpatient medications on file.      Review of Systems  A 10 point review of systems was performed and negative unless otherwise documented per HPI.    Physical Findings   Ht 67\"   Wt 177 lb (80.3 kg)   BMI 27.72 kg/m²   Physical Exam  Vitals and nursing note reviewed. Exam conducted with a chaperone present.   Constitutional:       General: She is not in acute distress.  HENT:      Head: Normocephalic and atraumatic.      Mouth/Throat:      Mouth: Mucous membranes are moist.   Cardiovascular:      Rate and Rhythm: Normal rate and regular rhythm.   Pulmonary:      Effort: Pulmonary effort is normal.   Abdominal:      General: There is no distension.      Palpations: Abdomen is soft.      Tenderness: There is abdominal tenderness (Mild left lower quadrant tenderness).   Musculoskeletal:         General: No deformity.   Skin:     General: Skin is warm and dry.   Neurological:      General: No focal deficit present.      Mental Status: She is alert.   Psychiatric:         Mood and Affect: Mood normal.     I have personally reviewed the imaging of patient's 3 most recent CT scans.  I showed the imaging to the patient.  Though there is no obvious signs of inflammation around the sigmoid colon in the last 2 CT scans, there is some subtle  thickening of the wall of the colon in this region suggestive of possible chronic diverticulitis or mild stricture.       Assessment   1. Pre-op testing        This is a very nice 59-year-old female previously under the care of of Dr. Coles in regards to her history of recurrent/smoldering diverticulitis.  Patient presents to clinic today to establish care with me.    Patient began having issues with diverticulitis in July 2023.  She was admitted to the hospital on 7/21/2023 and improved with IV antibiotics.  Dr. Coles was able to perform a colonoscopy on the patient on 9/7/2023 which revealed a tortuous colon, multiple widemouth diverticula of the sigmoid and descending colon without any polyps or lesions.  Patient last followed up with Dr. Coles on 1/10/2024.    Over the last 2+ months, patient's had persistent left lower quadrant abdominal pain.  She also has constipation that she is controlling with MiraLAX and stool softeners.  She has had 2 recent CT scans on 9/7/2023 and 1/25/2024.  Both of these scans showed diverticulosis without diverticulitis.  No recent fevers, nausea or vomiting.  Dr. Coles prescribed her a course of Norco for pain control and was tentatively scheduling her for robotic low anterior colon resection.  Patient's had a history of a laparoscopic procedure for endometriosis.  She does not take any blood thinners.  She is currently unemployed.    I have personally reviewed the imaging of patient's 3 most recent CT scans.  I showed the imaging to the patient.  Though there is no obvious signs of inflammation around the sigmoid colon in the last 2 CT scans, there is some subtle thickening of the wall of the colon in this region suggestive of possible chronic diverticulitis or mild stricture.    Plan  I discussed treatment options with the patient.  She has failed to improve with antibiotic therapy and bowel regimen.  She continues to have life-limiting left lower quadrant pain and I suspect  she has had some degree of chronic/smoldering diverticulitis based on her symptoms and recent CT scans.  Therefore, I did offer her elective robotic low anterior colon resection, possible open, possible ostomy.    The details of surgery were discussed including the expected recovery time, risks, benefits and alternatives. Specific risks of surgery that were discussed included but not limited to pain, bleeding, infection, bowel or ureteral injury, and anastomotic complications such as leak, bleeding or stricture. There is also a risk of possible ostomy creation at the time of surgery. Patients are generally hospitalized for 2-5 days after this type of surgery.  I advise no lifting more than 10-15 pounds for total of 6 weeks after surgery.  Patient would remain on a soft/low fiber diet for 4 to 6 weeks after surgery.  She should complete a bowel prep with oral antibiotics per ERAS protocol prior to surgery.      Patient expressed understanding and wished to move forward with scheduling surgery.  Surgery has been scheduled for 3/20/2024.  Patient did request a refill of her Norco.  I agreed to give her 1 additional refill of the Norco but informed her I would not be able to provide any further narcotic pain medication until after surgery.  Patient expressed understanding.     Orders Placed This Encounter   Procedures    Basic Metabolic Panel (8)    CBC W Differential W Platelet    CBC, Platelet; No Differential    EKG 12 Lead    Type and screen    ABORH (Blood Type)    Antibody Screen       Imaging & Referrals   VITAL SIGNS  NURSING COMMUNICATION  NURSING COMMUNICATION  NURSING COMMUNICATION  ACCU-CHEK  NOTIFY PHYSICIAN (SPECIFY)  PLACE PIV  HEIGHT AND WEIGHT  INTAKE AND OUTPUT  CHLORHEXIDINE GLUCONATE CLEANSER  NURSING COMMUNICATION  SKIN PREP  INITIATE ADULT PREOP PROPHYLACTIC ABX PROTOCOL  PLACE SEQUENTIAL COMPRESSION DEVICE  NPO  VITAL SIGNS - NOTIFY PHYSICIAN  NOTIFY PHYSICIAN (SPECIFY)  NOTIFY PHYSICIAN  (SPECIFY)  INITIATE ALGORITHM FOR HYPOGLYCEMIA FOR ADULTS (NON-PREG)  NURSING COMMUNICATION    Follow Up  No follow-ups on file.    Paulo Kearney MD

## 2024-03-21 LAB
ANION GAP SERPL CALC-SCNC: 6 MMOL/L (ref 0–18)
BASOPHILS # BLD AUTO: 0.02 X10(3) UL (ref 0–0.2)
BASOPHILS NFR BLD AUTO: 0.1 %
BUN BLD-MCNC: 10 MG/DL (ref 9–23)
CALCIUM BLD-MCNC: 8.8 MG/DL (ref 8.5–10.1)
CHLORIDE SERPL-SCNC: 107 MMOL/L (ref 98–112)
CO2 SERPL-SCNC: 23 MMOL/L (ref 21–32)
CREAT BLD-MCNC: 0.93 MG/DL
EGFRCR SERPLBLD CKD-EPI 2021: 70 ML/MIN/1.73M2 (ref 60–?)
EOSINOPHIL # BLD AUTO: 0 X10(3) UL (ref 0–0.7)
EOSINOPHIL NFR BLD AUTO: 0 %
ERYTHROCYTE [DISTWIDTH] IN BLOOD BY AUTOMATED COUNT: 13.5 %
GLUCOSE BLD-MCNC: 103 MG/DL (ref 70–99)
GLUCOSE BLD-MCNC: 123 MG/DL (ref 70–99)
GLUCOSE BLD-MCNC: 141 MG/DL (ref 70–99)
GLUCOSE BLD-MCNC: 151 MG/DL (ref 70–99)
GLUCOSE BLD-MCNC: 154 MG/DL (ref 70–99)
HCT VFR BLD AUTO: 31.2 %
HGB BLD-MCNC: 10.3 G/DL
IMM GRANULOCYTES # BLD AUTO: 0.08 X10(3) UL (ref 0–1)
IMM GRANULOCYTES NFR BLD: 0.5 %
LYMPHOCYTES # BLD AUTO: 1.36 X10(3) UL (ref 1–4)
LYMPHOCYTES NFR BLD AUTO: 8.3 %
MAGNESIUM SERPL-MCNC: 1.7 MG/DL (ref 1.6–2.6)
MCH RBC QN AUTO: 27.2 PG (ref 26–34)
MCHC RBC AUTO-ENTMCNC: 33 G/DL (ref 31–37)
MCV RBC AUTO: 82.5 FL
MONOCYTES # BLD AUTO: 0.99 X10(3) UL (ref 0.1–1)
MONOCYTES NFR BLD AUTO: 6.1 %
NEUTROPHILS # BLD AUTO: 13.87 X10 (3) UL (ref 1.5–7.7)
NEUTROPHILS # BLD AUTO: 13.87 X10(3) UL (ref 1.5–7.7)
NEUTROPHILS NFR BLD AUTO: 85 %
OSMOLALITY SERPL CALC.SUM OF ELEC: 282 MOSM/KG (ref 275–295)
PHOSPHATE SERPL-MCNC: 3.4 MG/DL (ref 2.5–4.9)
PLATELET # BLD AUTO: 299 10(3)UL (ref 150–450)
POTASSIUM SERPL-SCNC: 3.9 MMOL/L (ref 3.5–5.1)
RBC # BLD AUTO: 3.78 X10(6)UL
SODIUM SERPL-SCNC: 136 MMOL/L (ref 136–145)
WBC # BLD AUTO: 16.3 X10(3) UL (ref 4–11)

## 2024-03-21 PROCEDURE — 99232 SBSQ HOSP IP/OBS MODERATE 35: CPT | Performed by: HOSPITALIST

## 2024-03-21 NOTE — PROGRESS NOTES
[facility]   part of [facility]     Hospitalist Progress Note     Sandra Roche Patient Status:  Inpatient    1965 MRN [id number]   Location [facility] 3NW-A Attending Paulo Kearney MD   Hosp Day # 1 PCP Mariaelena Valle DO     Chief Complaint: Medical management    Subjective:     Patient seen and examined. Admits pain at surgical site. Denies N/V/CP/SOB.     Objective:    Review of Systems:   A comprehensive review of systems was completed; pertinent positive and negatives stated in subjective.    Vital signs:  Temp:  [97 °F (36.1 °C)-98.4 °F (36.9 °C)] 98.2 °F (36.8 °C)  Pulse:  [56-75] 75  Resp:  [10-19] 16  BP: (102-150)/(61-86) 115/61  SpO2:  [92 %-100 %] 99 %    Physical Exam:    General: No acute distress  Respiratory: No wheezes, no rhonchi  Cardiovascular: S1, S2, regular rate and rhythm  Abdomen: Soft, surgical site tenderness, non-distended, positive bowel sounds  Neuro: No new focal deficits.   Extremities: No edema    Diagnostic Data:    Labs:  Recent Labs   Lab 24  0951 24  0650   WBC 11.2* 16.3*   HGB 11.9* 10.3*   MCV 85.8 82.5   .0 299.0       Recent Labs   Lab 24  0650   *   BUN 10   CREATSERUM 0.93   CA 8.8      K 3.9      CO2 23.0       Estimated Creatinine Clearance: 61.8 mL/min (based on SCr of 0.93 mg/dL).    No results for input(s): \"TROP\", \"TROPHS\", \"CK\" in the last 168 hours.    No results for input(s): \"PTP\", \"INR\" in the last 168 hours.               Microbiology    No results found for this visit on 24.      Imaging: Reviewed in Epic.    Medications:    heparin  5,000 Units Subcutaneous Q8H KIM    famotidine  20 mg Oral BID    Or    famotidine  20 mg Intravenous BID    magnesium oxide  400 mg Oral Daily    ketorolac  15 mg Intravenous Q6H    Followed by    ibuprofen  600 mg Oral 4 times per day    acetaminophen  1,000 mg Intravenous Q6H    insulin aspart  1-5 Units Subcutaneous TID AC and HS    atorvastatin   80 mg Oral Nightly    triamterene-hydroCHLOROthiazide  1 capsule Oral Nightly       Assessment & Plan:      # Diverticulitis   - sp robotic LAR 3/20  - pain control  - per surgery      # HTN   - Controlled       # HL   - statin      #DM 2  - ISS     # GERD  - pepcid    Earnest Newsome,     Supplementary Documentation:     Quality:  DVT Mechanical Prophylaxis:   SCDs, Early ambuation  DVT Pharmacologic Prophylaxis   Medication    heparin (Porcine) 5000 UNIT/ML injection 5,000 Units                Code Status: Not on file  [place]ow[place]: No urinary catheter in place  [place] Duration (in days):   Central line:    NIKOS:     Discharge is dependent on: clinical progress  At this point Ms. Roche is expected to be discharge to: home    The 21st Century Cures Act makes medical notes like these available to patients in the interest of transparency. Please be advised this is a medical document. Medical documents are intended to carry relevant information, facts as evident, and the clinical opinion of the practitioner. The medical note is intended as peer to peer communication and may appear blunt or direct. It is written in medical language and may contain abbreviations or verbiage that are unfamiliar.

## 2024-03-21 NOTE — PROGRESS NOTES
NURSING ADMISSION NOTE      Patient admitted via  Bed  Oriented to room.  Safety precautions initiated.  Bed in low position.  Call light in reach.

## 2024-03-21 NOTE — PROGRESS NOTES
A&Ox4. VSS. RA. ERAS protocol  GI: Abdomen soft, nondistended.  Denies nausea.  : DTV, fabian removed at 0620  Pain controlled with PRN pain medications  Up with standby assist.  Drains: SERGIO drain  Incisions: x5 lap sites (4 small 1 large) with dermabond  Diet: clears  IVF running per order.  All appropriate safety measures in place. Patient updated on plan of care. All questions and concerns addressed.

## 2024-03-21 NOTE — PLAN OF CARE
Problem: Patient/Family Goals  Goal: Patient/Family Long Term Goal  Description: Patient's Long Term Goal: Discharge home    Interventions:  - Up to walk x4 daily  -return of baseline bowel habits  - See additional Care Plan goals for specific interventions  Outcome: Progressing  Goal: Patient/Family Short Term Goal  Description: Patient's Short Term Goal: manage pain    Interventions:   - PRN and scheduled medications  -heat and or ice packs PRN  - See additional Care Plan goals for specific interventions  Outcome: Progressing     Patient tolerating CLD. Pain managed with current routine. Patient voided X2 since removal of fabian cath. Bladder scans and PVRs performed with highest reading being less than 400ml. Patient ambulates to the bathroom with assistance. Fall and safety precautions in place. SERGIO drain in place, old drainage noted to site, no evidence of new drainage noted. Fall, safety and diet precautions in place. Patient updated, progressing and in agreement with POC.

## 2024-03-21 NOTE — CONSULTS
[facility] HOSPITALIST  CONSULT     Sandra Roche Patient Status:  Inpatient    1965 MRN [id number]   Location [facility] 3NW-A Attending Paulo Kearney MD   Hosp Day # 1 PCP Mariaelena Valle DO     Reason for consult: HTN, DM     Requested by: Dr Kearney     Subjective:   History of Present Illness:     Sandra Roche is a 59 year old female with DM, HTN, HL, GERD with recurrent diverticulitis underwent robotic low anterior colon resection today. She has some pain but overall states feels much better than expected.   No CP/SOB/N/V/dizziness..     She is compliant with her meds at home and in fact is requesting they are resumed tonight as she takes her meds nightly.     History/Other:    Past Medical History:  Past Medical History:   Diagnosis Date    Diabetes (HCC)     Diverticulosis of large intestine     Esophageal reflux     Essential hypertension     High blood pressure     High cholesterol     Osteoarthritis     Pneumonia due to organism     Stroke (HCC)         Visual impairment     READING GLASSES     Past Surgical History:   Past Surgical History:   Procedure Laterality Date    COLONOSCOPY N/A 2023    Procedure: COLONOSCOPY;  Surgeon: Evelyn Coles MD;  Location:  ENDOSCOPY    EXPLORATORY OF ABDOMEN      KNEE REPLACEMENT SURGERY        Family History:   Family History   Problem Relation Age of Onset    Heart Disorder Father     Cancer Mother     Hypertension Mother      Social History:    reports that she has been smoking cigarettes. She has been smoking an average of 0.1 packs per day. She has never used smokeless tobacco. She reports current alcohol use. She reports that she does not use drugs.     Allergies:   Allergies   Allergen Reactions    Seasonal OTHER (SEE COMMENTS)     Watery eyes, sneezing        Medications:    Current Facility-Administered Medications on File Prior to Encounter   Medication Dose Route Frequency Provider Last Rate Last Admin    [COMPLETED]  iopamidol 76% (ISOVUE-370) injection for power injector  100 mL Intravenous ONCE PRN Evelyn Coles MD   100 mL at 01/25/24 0974     Current Outpatient Medications on File Prior to Encounter   Medication Sig Dispense Refill    PEG 3350-KCl-Na Bicarb-NaCl 420 g Oral Recon Soln Starting at 4:00 pm the night before procedure, drink 8 ounces of the prep every 15-20 minutes until finished. 1 each 0    neomycin 500 MG Oral Tab Take 2 tablets by mouth at 1pm, 2pm and 11pm 6 tablet 0    metRONIDAZOLE (FLAGYL) 500 MG Oral Tab Take 1 tablet by mouth at 1pm, 2pm and 11pm 3 tablet 0    HYDROcodone-acetaminophen (NORCO) 5-325 MG Oral Tab Take 1 tablet by mouth every 6 (six) hours as needed for Pain. 1-2 tablets by mouth every 4-6 hours as needed for pain. 20 tablet 0    docusate sodium 100 MG Oral Cap Take 1 capsule (100 mg total) by mouth 2 (two) times daily. 60 capsule 2    atorvastatin 80 MG Oral Tab TAKE 1 TABLET(80 MG) BY MOUTH EVERY NIGHT 90 tablet 2    HYDROcodone-acetaminophen (NORCO)  MG Oral Tab Take 1 tablet by mouth every 6 (six) hours as needed for Pain. 1-2 tablets by mouth every 4-6 hours as needed for pain. 20 tablet 0    Triamterene-HCTZ 37.5-25 MG Oral Tab Take 1 tablet by mouth daily. 90 tablet 0    HYDROcodone-acetaminophen (NORCO) 5-325 MG Oral Tab Take 1 tablet by mouth 2 (two) times daily as needed for Pain. 20 tablet 0    fluticasone propionate 50 MCG/ACT Nasal Suspension 1 spray by Each Nare route as needed. 1 each 1    cetirizine 10 MG Oral Tab Take 1 tablet (10 mg total) by mouth as needed for Allergies.      albuterol 108 (90 Base) MCG/ACT Inhalation Aero Soln Inhale 2 puffs into the lungs 4 (four) times daily as needed. 1 each 0       Review of Systems:   A comprehensive review of systems was completed.    Pertinent positives and negatives noted in the HPI.    Objective:   Physical Exam:    /79 (BP Location: Right arm)   Pulse 67   Temp 97.4 °F (36.3 °C) (Axillary)   Resp 12    Ht 5' 7\" (1.702 m)   Wt 174 lb 12.8 oz (79.3 kg)   SpO2 96%   BMI 27.38 kg/m²   General: No acute distress, Alert  Respiratory: No rhonchi, no wheezes  Cardiovascular: S1, S2. Regular rate and rhythm  Abdomen: Soft, NT/ND, +BS  Neuro: No new focal deficits  Extremities: No edema      Results:    Labs:      Labs Last 24 Hours:  Recent Labs   Lab 03/18/24  0951   WBC 11.2*   HGB 11.9*   MCV 85.8   .0       No results for input(s): \"GLU\", \"BUN\", \"CREATSERUM\", \"GFRAA\", \"GFRNAA\", \"CA\", \"ALB\", \"NA\", \"K\", \"CL\", \"CO2\", \"ALKPHO\", \"AST\", \"ALT\", \"BILT\", \"TP\" in the last 168 hours.    No results for input(s): \"PTP\", \"INR\" in the last 168 hours.    No results for input(s): \"TROP\", \"CK\" in the last 168 hours.      Imaging: Imaging data reviewed in Epic.    Assessment & Plan:      # diverticulitis   - sp robotic LAR  - pain control  - per surgery     # HTN   - controlled.   -pt requesting her home diuretic to be resumed     # HL   - statin     #DM 2  - ISS    # GERD        Plan of care discussed with patient and RN.     Anisa Rivers MD  3/21/2024    The 21st Century Cures Act makes medical notes like these available to patients in the interest of transparency. Please be advised this is a medical document. Medical documents are intended to carry relevant information, facts as evident, and the clinical opinion of the practitioner. The medical note is intended as peer to peer communication and may appear blunt or direct. It is written in medical language and may contain abbreviations or verbiage that are unfamiliar.

## 2024-03-21 NOTE — PROGRESS NOTES
[facility]  Progress Note    Sandra Roche Patient Status:  Inpatient    1965 MRN [id number]   Location [facility] 3NW-A Attending Paulo Kearney MD   Hosp Day # 1 PCP Mariaelena Valle,      Subjective:  The patient is resting in bed. She reports feeling a little better today. She reports incisional soreness. She denies nausea or vomiting. She reports passing some flatus, denies bowel movement. She is tolerating a clear liquid diet without issues. She reports ambulating the halls earlier.     Fabian was removed earlier today. She states she has not been able to urinate after fabian removal. She underwent a bladder scan and her nurse plans to repeat a bladder scan in a few hours.     Objective/Physical Exam:  General: Alert, orientated x3.  Cooperative.  No apparent distress.  Vital Signs:  Blood pressure 115/61, pulse 75, temperature 98.2 °F (36.8 °C), temperature source Oral, resp. rate 16, height 67\", weight 174 lb 12.8 oz (79.3 kg), SpO2 99%.  HEENT: Normocephalic, atraumatic. No scleral icterus.  Abdomen:  Soft, slightly distended, appropriately tender to palpation, with no rebound or guarding.  No peritoneal signs.  Incision: Dry, clean, and intact with skin glue in place. No surrounding erythema or drainage. No signs of infection.  Drain: in place with 100 ml of serosanguineous output over the past 24 hours.    Labs:  CBC:    Lab Results   Component Value Date    WBC 16.3 (H) 2024    WBC 11.2 (H) 2024    WBC 10.3 2024     Lab Results   Component Value Date    HEMOGLOBIN 13.0 2023    HGB 10.3 (L) 2024    HGB 11.9 (L) 2024    HGB 12.4 2024      Lab Results   Component Value Date    .0 2024    .0 2024    .0 2024         Assessment/Plan:  Patient Active Problem List   Diagnosis    Status post right knee replacement    Primary osteoarthritis of both knees    Type 2 diabetes mellitus with hyperglycemia, without  long-term current use of insulin (HCC)    Hypercholesteremia    Insomnia    Seasonal allergies    History of colon polyps    Primary hypertension    Gastroesophageal reflux disease    History of CVA (cerebrovascular accident)    History of inflammatory arthritis    Primary osteoarthritis involving multiple joints    History of diverticulitis    Need for diphtheria-tetanus-pertussis (Tdap) vaccine    Diverticulitis     POD 1 robotic low anterior colon resection for diverticulitis    Continue clear liquid diet.  Repeat bladder scan in a few hours. If she is unable to urinate spontaneously, she may need to be straight cathed.    Continue pain control and antiemetics as needed.  Encourage ambulation and up to chair.  DVT prophylaxis with heparin  GI prophylaxis with pepcid    FRANKIE Walker  3/21/2024  1:10 PM

## 2024-03-22 LAB
ANION GAP SERPL CALC-SCNC: 0 MMOL/L (ref 0–18)
BUN BLD-MCNC: 7 MG/DL (ref 9–23)
CALCIUM BLD-MCNC: 9.1 MG/DL (ref 8.5–10.1)
CHLORIDE SERPL-SCNC: 112 MMOL/L (ref 98–112)
CO2 SERPL-SCNC: 27 MMOL/L (ref 21–32)
CREAT BLD-MCNC: 0.73 MG/DL
EGFRCR SERPLBLD CKD-EPI 2021: 94 ML/MIN/1.73M2 (ref 60–?)
ERYTHROCYTE [DISTWIDTH] IN BLOOD BY AUTOMATED COUNT: 13.6 %
GLUCOSE BLD-MCNC: 118 MG/DL (ref 70–99)
GLUCOSE BLD-MCNC: 130 MG/DL (ref 70–99)
GLUCOSE BLD-MCNC: 135 MG/DL (ref 70–99)
GLUCOSE BLD-MCNC: 141 MG/DL (ref 70–99)
GLUCOSE BLD-MCNC: 151 MG/DL (ref 70–99)
HCT VFR BLD AUTO: 33 %
HGB BLD-MCNC: 10.4 G/DL
MCH RBC QN AUTO: 26.5 PG (ref 26–34)
MCHC RBC AUTO-ENTMCNC: 31.5 G/DL (ref 31–37)
MCV RBC AUTO: 84 FL
OSMOLALITY SERPL CALC.SUM OF ELEC: 287 MOSM/KG (ref 275–295)
PLATELET # BLD AUTO: 308 10(3)UL (ref 150–450)
POTASSIUM SERPL-SCNC: 3.5 MMOL/L (ref 3.5–5.1)
RBC # BLD AUTO: 3.93 X10(6)UL
SODIUM SERPL-SCNC: 139 MMOL/L (ref 136–145)
WBC # BLD AUTO: 10 X10(3) UL (ref 4–11)

## 2024-03-22 PROCEDURE — 99232 SBSQ HOSP IP/OBS MODERATE 35: CPT | Performed by: HOSPITALIST

## 2024-03-22 NOTE — PROGRESS NOTES
[facility]   part of [facility]     Hospitalist Progress Note     Sandra Roche Patient Status:  Inpatient    1965 MRN [id number]   [facility] 3NW-A Attending Paulo Kearney MD   Hosp Day # 2 PCP Mariaelena Valle DO     Chief Complaint: Medical management    Subjective:     Patient seen and examined. Pain controlled. + Flatus, no BM yet.      Objective:    Review of Systems:   A comprehensive review of systems was completed; pertinent positive and negatives stated in subjective.    Vital signs:  Temp:  [97.7 °F (36.5 °C)-98.5 °F (36.9 °C)] 98.5 °F (36.9 °C)  Pulse:  [58-78] 62  Resp:  [16-18] 16  BP: ()/(65-83) 111/70  SpO2:  [93 %-100 %] 97 %    Physical Exam:    General: No acute distress  Respiratory: No wheezes, no rhonchi  Cardiovascular: S1, S2, regular rate and rhythm  Abdomen: Soft, surgical site tenderness, non-distended, positive bowel sounds  Neuro: No new focal deficits.   Extremities: No edema    Diagnostic Data:    Labs:  Recent Labs   Lab 24  0951 24  0650 24  0826   WBC 11.2* 16.3* 10.0   HGB 11.9* 10.3* 10.4*   MCV 85.8 82.5 84.0   .0 299.0 308.0       Recent Labs   Lab 24  0650 24  0826   * 118*   BUN 10 7*   CREATSERUM 0.93 0.73   CA 8.8 9.1    139   K 3.9 3.5    112   CO2 23.0 27.0       Estimated Creatinine Clearance: 78.7 mL/min (based on SCr of 0.73 mg/dL).    No results for input(s): \"TROP\", \"TROPHS\", \"CK\" in the last 168 hours.    No results for input(s): \"PTP\", \"INR\" in the last 168 hours.               Microbiology    No results found for this visit on 24.      Imaging: Reviewed in Epic.    Medications:    heparin  5,000 Units Subcutaneous Q8H KIM    famotidine  20 mg Oral BID    Or    famotidine  20 mg Intravenous BID    magnesium oxide  400 mg Oral Daily    ibuprofen  600 mg Oral 4 times per day    insulin aspart  1-5 Units Subcutaneous TID AC and HS    atorvastatin  80 mg Oral  Nightly    triamterene-hydroCHLOROthiazide  1 capsule Oral Nightly       Assessment & Plan:      # Diverticulitis   - sp robotic LAR 3/20  - pain control  - per surgery      # HTN   - Controlled       # HL   - statin      #DM 2  - ISS     # GERD  - pepcid    #Disposition  -Stable for DC from hospitalist standpoint     Earnest Newsome DO    Supplementary Documentation:     Quality:  DVT Mechanical Prophylaxis:   SCDs, Early ambuation  DVT Pharmacologic Prophylaxis   Medication    heparin (Porcine) 5000 UNIT/ML injection 5,000 Units                Code Status: Not on file  Cowan: No urinary catheter in place  Cowan Duration (in days):   Central line:    NIKOS: 3/23/2024    Discharge is dependent on: clinical progress  At this point Ms. Roche is expected to be discharge to: home    The 21st Century Cures Act makes medical notes like these available to patients in the interest of transparency. Please be advised this is a medical document. Medical documents are intended to carry relevant information, facts as evident, and the clinical opinion of the practitioner. The medical note is intended as peer to peer communication and may appear blunt or direct. It is written in medical language and may contain abbreviations or verbiage that are unfamiliar.

## 2024-03-22 NOTE — PROGRESS NOTES
[facility]  Progress Note    Sandra Roche Patient Status:  Inpatient    1965 MRN [id number]   Location [facility] 3NW-A Attending Paulo Kearney MD   Hosp Day # 2 PCP Mariaelena Valle DO     Subjective:  The patient is resting in bed. She reports abdominal soreness. She denies nausea or vomiting. She reports passing a lot of flatus, denies bowel movement. She does feel hungry and is interested in advancing her diet.     She is able to void freely.     Objective/Physical Exam:  General: Alert, orientated x3.  Cooperative.  No apparent distress.  Vital Signs:  Blood pressure 130/78, pulse 64, temperature 98.2 °F (36.8 °C), temperature source Oral, resp. rate 18, height 67\", weight 174 lb 12.8 oz (79.3 kg), SpO2 93%.  HEENT: Normocephalic, atraumatic. No scleral icterus.  Abdomen:  Soft, slightly distended, appropriately tender to palpation, with no rebound or guarding.  No peritoneal signs.  Incision: Dry, clean, and intact with skin glue in place. No surrounding erythema or drainage. No signs of infection.  Drain: in place with 30 ml of serosangineous output over the past 24 hours.    Labs:  CBC:    Lab Results   Component Value Date    WBC 16.3 (H) 2024    WBC 11.2 (H) 2024    WBC 10.3 2024     Lab Results   Component Value Date    HEMOGLOBIN 13.0 2023    HGB 10.3 (L) 2024    HGB 11.9 (L) 2024    HGB 12.4 2024      Lab Results   Component Value Date    .0 2024    .0 2024    .0 2024         Assessment/Plan:  Patient Active Problem List   Diagnosis    Status post right knee replacement    Primary osteoarthritis of both knees    Type 2 diabetes mellitus with hyperglycemia, without long-term current use of insulin (HCC)    Hypercholesteremia    Insomnia    Seasonal allergies    History of colon polyps    Primary hypertension    Gastroesophageal reflux disease    History of CVA (cerebrovascular accident)    History of  inflammatory arthritis    Primary osteoarthritis involving multiple joints    History of diverticulitis    Need for diphtheria-tetanus-pertussis (Tdap) vaccine    Diverticulitis     POD 2 robotic low anterior colon resection for diverticulitis    Advance to soft diet.   Continue pain control and antiemetics as needed.  Encourage ambulation and up to chair.  DVT prophylaxis with heparin  GI prophylaxis with Pepcid  Hopeful discharge home later today or tomorrow if she tolerates a soft diet and has a bowel movement. Okay to remove SERGIO drain prior to discharge.  She is to follow up with [facility] in 2 weeks.       FRANKIE Walker  3/22/2024  7:12 AM     This note was initiated by Teresa Fuentes PA-C.  The PA saw the patient in conjunction with me. The PA performed a history, exam, and developed the assessment and plan. I agree with the mentioned assessment and plan and have provided further documentation of my own, if necessary.    Paulo Kearney MD  [facility]

## 2024-03-22 NOTE — PROGRESS NOTES
A&Ox4. VSS. RA.   GI: Abdomen soft, nondistended. Passing gas.  Denies nausea.  : Voids.  Pain controlled with PRN pain medications  Up with standby assist and walker  Drains: R side SERGIO drain   Incisions: x5 lap sites with skin glue  Diet: clears  IVF running per order.  All appropriate safety measures in place. Patient updated on plan of care. All questions and concerns addressed.

## 2024-03-22 NOTE — PROGRESS NOTES
Alert & oriented x4.With tolerable pain.passing flatus .Tolerated diet.Up in room with walker  [id number] of I.S. and ambulation in hallway encouraged.Plan of care discussed with patient.All questions and concerns addressed.

## 2024-03-23 VITALS
SYSTOLIC BLOOD PRESSURE: 107 MMHG | BODY MASS INDEX: 27.44 KG/M2 | HEIGHT: 67 IN | HEART RATE: 67 BPM | TEMPERATURE: 98 F | OXYGEN SATURATION: 95 % | DIASTOLIC BLOOD PRESSURE: 66 MMHG | WEIGHT: 174.81 LBS | RESPIRATION RATE: 18 BRPM

## 2024-03-23 LAB
GLUCOSE BLD-MCNC: 120 MG/DL (ref 70–99)
GLUCOSE BLD-MCNC: 128 MG/DL (ref 70–99)

## 2024-03-23 PROCEDURE — 99232 SBSQ HOSP IP/OBS MODERATE 35: CPT | Performed by: HOSPITALIST

## 2024-03-23 RX ORDER — HYDROCODONE BITARTRATE AND ACETAMINOPHEN 5; 325 MG/1; MG/1
1 TABLET ORAL EVERY 6 HOURS PRN
Qty: 15 TABLET | Refills: 0 | Status: SHIPPED | OUTPATIENT
Start: 2024-03-23

## 2024-03-23 NOTE — DISCHARGE SUMMARY
[facility]  Discharge Summary    Sandra Roche Patient Status:  Inpatient    1965 MRN [id number]   Location [facility] 3NW-A Attending Paulo Kearney MD   Hosp Day # 3 PCP Mariaelena Valle,      Date of Admission: 3/20/2024    Date of Discharge: 3/23/2024    Admitting Diagnosis: Sigmoid diverticulitis [K57.32]       Patient Active Problem List   Diagnosis    Status post right knee replacement    Primary osteoarthritis of both knees    Type 2 diabetes mellitus with hyperglycemia, without long-term current use of insulin (HCC)    Hypercholesteremia    Insomnia    Seasonal allergies    History of colon polyps    Primary hypertension    Gastroesophageal reflux disease    History of CVA (cerebrovascular accident)    History of inflammatory arthritis    Primary osteoarthritis involving multiple joints    History of diverticulitis    Need for diphtheria-tetanus-pertussis (Tdap) vaccine    Diverticulitis       Reason for Admission:  Sigmoid diverticulitis [K57.32]     Procedures: ROBOTIC LOW ANTERIOR COLON RESECTION    History of Present Illness: Sigmoid diverticulitis [K57.32]    This is a very nice 59-year-old female previously under the care of of Dr. Coles in regards to her history of recurrent/smoldering diverticulitis.  Patient presents to clinic today to establish care with me.     Patient began having issues with diverticulitis in 2023.  She was admitted to the hospital on 2023 and improved with IV antibiotics.  Dr. Coles was able to perform a colonoscopy on the patient on 2023 which revealed a tortuous colon, multiple widemouth diverticula of the sigmoid and descending colon without any polyps or lesions.  Patient last followed up with Dr. Coles on 1/10/2024.     Over the last 2+ months, patient's had persistent left lower quadrant abdominal pain.  She also has constipation that she is controlling with MiraLAX and stool softeners.  She has had 2 recent CT scans on 2023  and 1/25/2024.  Both of these scans showed diverticulosis without diverticulitis.  No recent fevers, nausea or vomiting.  Dr. Coles prescribed her a course of Norco for pain control and was tentatively scheduling her for robotic low anterior colon resection.  Patient's had a history of a laparoscopic procedure for endometriosis.  She does not take any blood thinners.  She is currently unemployed.       Hospital Course: The patient tolerated the above listed procedure without complication. The patient is being discharged with the following physical exam.      Physical Exam: Abdomen soft, non-tender, bowel sounds present in all four quadrants. Incisions clean, dry, intact, no erythema.    Consultations: hospitalist     Complications: none     Disposition: Home to self care     Discharge Condition: Good    Discharge Medications:   Current Discharge Medication List        CONTINUE these medications which have CHANGED    Details   HYDROcodone-acetaminophen (NORCO) 5-325 MG Oral Tab Take 1 tablet by mouth every 6 (six) hours as needed for Pain.  Qty: 15 tablet, Refills: 0    Associated Diagnoses: Post-operative pain           CONTINUE these medications which have NOT CHANGED    Details   fluticasone propionate 50 MCG/ACT Nasal Suspension 2 sprays by Nasal route daily.  Qty: 16 g, Refills: 0      metFORMIN  MG Oral Tablet 24 Hr Take 1 tablet (500 mg total) by mouth daily.  Qty: 90 tablet, Refills: 3    Associated Diagnoses: Type 2 diabetes mellitus without complication, without long-term current use of insulin (HCC)      docusate sodium 100 MG Oral Cap Take 1 capsule (100 mg total) by mouth 2 (two) times daily.  Qty: 60 capsule, Refills: 2    Associated Diagnoses: Sigmoid diverticulitis      atorvastatin 80 MG Oral Tab TAKE 1 TABLET(80 MG) BY MOUTH EVERY NIGHT  Qty: 90 tablet, Refills: 2    Associated Diagnoses: Type 2 diabetes mellitus without complication, without long-term current use of insulin (HCC);  Hypercholesteremia; History of CVA (cerebrovascular accident)      Triamterene-HCTZ 37.5-25 MG Oral Tab Take 1 tablet by mouth daily.  Qty: 90 tablet, Refills: 0    Associated Diagnoses: Primary hypertension      albuterol 108 (90 Base) MCG/ACT Inhalation Aero Soln Inhale 2 puffs into the lungs 4 (four) times daily as needed.  Qty: 1 each, Refills: 0      ergocalciferol 1.25 MG (38233 UT) Oral Cap Take 1 capsule (50,000 Units total) by mouth once a week.  Qty: 6 capsule, Refills: 0      zolpidem 10 MG Oral Tab Take 1 tablet (10 mg total) by mouth nightly as needed for Sleep.  Qty: 90 tablet, Refills: 0    Associated Diagnoses: Insomnia, unspecified type      cetirizine 10 MG Oral Tab Take 1 tablet (10 mg total) by mouth daily.  Qty: 30 tablet, Refills: 0           STOP taking these medications       PEG 3350-KCl-Na Bicarb-NaCl 420 g Oral Recon Soln        neomycin 500 MG Oral Tab        metRONIDAZOLE (FLAGYL) 500 MG Oral Tab        HYDROcodone-acetaminophen (NORCO)  MG Oral Tab              Follow up Visits: Follow-up with [facility] physician assistant in approximately the next 14 days. Follow up with Dr. Kearney in 1 month    Other Discharge Instructions:    Soft diet  No lifting, no driving, the patient may shower  [place] for pain      Jayla Meyers PA-C  3/23/2024  2:23 PM

## 2024-03-23 NOTE — PROGRESS NOTES
A&O x4, hopeful for discharge home today. Denies any CP, [place], or calf pain at present. Lungs clear bialterally. Abdomen soft, tender, nondistended. Bowel sounds active, pt reports passing flatus and having a BM yesterday. Lap sites x4 c/d/I with skin glue. Voiding without difficulty. SL. Pt reports pain 6/10, pain medication given. Pt encouraged to sit up in chair, use IS, and ambulate frequently. Pt resting in bed, call light within reach, safety precautions in place.

## 2024-03-23 NOTE — PROGRESS NOTES
[facility]  Progress Note    Sandra Roche Patient Status:  Inpatient    1965 MRN [id number]   Location [facility] 3NW-A Attending Paulo Kearney MD   Hosp Day # 3 PCP Mariaelena Valle DO     Subjective:  The patient was seen and examined sitting at bedside. She denies significant abdominal pain. She is tolerating a soft diet. She denies nausea or vomiting. She is having bowel movements. She states her bowel movements have been loose.     Objective/Physical Exam:  /66 (BP Location: Right arm)   Pulse 67   Temp 97.8 °F (36.6 °C) (Oral)   Resp 18   Ht 67\"   Wt 174 lb 12.8 oz (79.3 kg)   SpO2 95%   BMI 27.38 kg/m²     Intake/Output Summary (Last 24 hours) at 3/23/2024 1325  Last data filed at 3/23/2024 1137  Gross per 24 hour   Intake 720 ml   Output 1340 ml   Net -620 ml         General: Alert, oriented x3. No acute distress.  HEENT: Normocephalic, atraumatic. No scleral icterus.  Pulmonary: No respiratory distress, effort normal.   Abdomen: Non-distended, without tympany to percussion. Soft, mild incisional site tenderness to palpation. No rebound or guarding. No peritoneal signs.   Incision: laparoscopic and Pfannenstiel incision sites are clean, dry, intact without surrounding erythema or cellulitis. Skin glue is in place  Extremities: No lower extremity edema. No clubbing or cyanosis.   Skin: Warm, dry. No jaundice.       Labs:      No results found for: \"PT\", \"INR\"          Assessment:  Patient Active Problem List   Diagnosis    Status post right knee replacement    Primary osteoarthritis of both knees    Type 2 diabetes mellitus with hyperglycemia, without long-term current use of insulin (HCC)    Hypercholesteremia    Insomnia    Seasonal allergies    History of colon polyps    Primary hypertension    Gastroesophageal reflux disease    History of CVA (cerebrovascular accident)    History of inflammatory arthritis    Primary osteoarthritis involving multiple joints    History  of diverticulitis    Need for diphtheria-tetanus-pertussis (Tdap) vaccine    Diverticulitis     POD 3 robotic low anterior resection of the rectosigmoid colon for diverticulitis     Plan:  Overall, the patient is doing well post-operatively. Plan for discharge home today   Soft diet  Ibuprofen and tylenol primarily for pain management. Will prescribe norco for breakthrough pain   No lifting  Resume activities of daily living. Encourage ambulation  No driving  The patient may shower   Remove SERGIO drain prior to discharge   Follow up with [facility] in 2 weeks      The patient was discussed with Dr. Schumacher , and she is in agreement with the assessment and plan. My total face time with this patient was 25 minutes. Greater than half of the visit was spent in counseling the patient on the above listed diagnoses and treatment options.     Jayla Meyers PA-C  3/23/2024  1:25 PM

## 2024-03-23 NOTE — PLAN OF CARE
A&Ox4. VSS. RA. Denies chest pain and SOB.   GI: Abdomen soft, nondistended. Passing gas.   Denies nausea.   : Voids.   Pain controlled with scheduled pain medications.   Up with standby assist and a walker  Drains: SERGIO to right abdomen   Incisions: laps x5 with glue   Diet: tolerating soft diet  IVF SL.   All appropriate safety measures in place. All questions and concerns addressed

## 2024-03-23 NOTE — PROGRESS NOTES
NURSING DISCHARGE NOTE    Discharged Home via Wheelchair.  Accompanied by Family member and Support staff  Belongings Taken by patient/family.    Pt in stable condition and reports feeling ready to go home. Discharge instructions given, pt/family verbalizes understanding. All questions answered.

## 2024-03-23 NOTE — PROGRESS NOTES
[facility]   part of [facility]     Hospitalist Progress Note     Sandra Roche Patient Status:  Inpatient    1965 MRN [id number]   Location [facility] 3NW-A Attending Paulo Kearney MD   Hosp Day # 3 PCP Mariaelena Valle DO     Chief Complaint: Medical management    Subjective:     Patient seen and examined. Pain controlled. + Flatus and BM.      Objective:    Review of Systems:   A comprehensive review of systems was completed; pertinent positive and negatives stated in subjective.    Vital signs:  Temp:  [97.8 °F (36.6 °C)-98.4 °F (36.9 °C)] 97.8 °F (36.6 °C)  Pulse:  [59-71] 67  Resp:  [16-18] 18  BP: (107-122)/(66-76) 107/66  SpO2:  [94 %-99 %] 95 %    Physical Exam:    General: No acute distress  Respiratory: No wheezes, no rhonchi  Cardiovascular: S1, S2, regular rate and rhythm  Abdomen: Soft, surgical site tenderness, non-distended, positive bowel sounds  Neuro: No new focal deficits.   Extremities: No edema    Diagnostic Data:    Labs:  Recent Labs   Lab 24  0951 24  0650 24  0826   WBC 11.2* 16.3* 10.0   HGB 11.9* 10.3* 10.4*   MCV 85.8 82.5 84.0   .0 299.0 308.0       Recent Labs   Lab 24  0650 24  0826   * 118*   BUN 10 7*   CREATSERUM 0.93 0.73   CA 8.8 9.1    139   K 3.9 3.5    112   CO2 23.0 27.0       Estimated Creatinine Clearance: 78.7 mL/min (based on SCr of 0.73 mg/dL).    No results for input(s): \"TROP\", \"TROPHS\", \"CK\" in the last 168 hours.    No results for input(s): \"PTP\", \"INR\" in the last 168 hours.               Microbiology    No results found for this visit on 24.      Imaging: Reviewed in Epic.    Medications:    heparin  5,000 Units Subcutaneous Q8H KIM    famotidine  20 mg Oral BID    Or    famotidine  20 mg Intravenous BID    magnesium oxide  400 mg Oral Daily    ibuprofen  600 mg Oral 4 times per day    insulin aspart  1-5 Units Subcutaneous TID AC and HS    atorvastatin  80 mg Oral Nightly     triamterene-hydroCHLOROthiazide  1 capsule Oral Nightly       Assessment & Plan:      # Diverticulitis   - sp robotic LAR 3/20  - pain control  - per surgery      # HTN   - Controlled       # HL   - statin      #DM 2  - ISS     # GERD  - pepcid    #Disposition  -Stable for DC from hospitalist standpoint     Earnest Newsome DO    Supplementary Documentation:     Quality:  DVT Mechanical Prophylaxis:   SCDs, Early ambuation  DVT Pharmacologic Prophylaxis   Medication    heparin (Porcine) 5000 UNIT/ML injection 5,000 Units                Code Status: Not on file  Cowan: No urinary catheter in place  Cowan Duration (in days):   Central line:    NIKOS: 3/23/2024    Discharge is dependent on: clinical progress  At this point Ms. Roche is expected to be discharge to: home    The 21st Century Cures Act makes medical notes like these available to patients in the interest of transparency. Please be advised this is a medical document. Medical documents are intended to carry relevant information, facts as evident, and the clinical opinion of the practitioner. The medical note is intended as peer to peer communication and may appear blunt or direct. It is written in medical language and may contain abbreviations or verbiage that are unfamiliar.

## 2024-03-25 ENCOUNTER — PATIENT OUTREACH (OUTPATIENT)
Dept: CASE MANAGEMENT | Age: 59
End: 2024-03-25

## 2024-03-25 ENCOUNTER — TELEPHONE (OUTPATIENT)
Dept: INTERNAL MEDICINE CLINIC | Facility: CLINIC | Age: 59
End: 2024-03-25

## 2024-03-25 DIAGNOSIS — Z02.9 ENCOUNTERS FOR UNSPECIFIED ADMINISTRATIVE PURPOSE: ICD-10-CM

## 2024-03-25 DIAGNOSIS — K57.32 SIGMOID DIVERTICULITIS: Primary | ICD-10-CM

## 2024-03-25 NOTE — TELEPHONE ENCOUNTER
Spoke with patient for HFU today. Patient confirms specialist f/u appts, as scheduled. Patient agreeable to HFU (non-TCM) appt with Dr. Valle--this NCM unable to book within 1 week timeframe, per provider's protocol, d/t schedule restrictions.    TCM appointment recommended by 4/06/2024, as patient is a Moderate risk for readmission.  Please advise.    Clinical staff:  Please discuss with PCP and follow-up with patient, accordingly. Thank you!     Future Appointments   Date Time Provider Department Center   3/27/2024 10:45 AM [facility] GEN [facility]G PA [facility] [id number]   4/8/2024 10:20 AM Laurent Patterson PA-C [facility] ORTHO [id number] [facility]   4/29/2024 10:45 AM Paulo Kearney MD [facility] [id number]

## 2024-03-25 NOTE — PAYOR COMM NOTE
--------------  DISCHARGE REVIEW    Payor: [facility]  Subscriber #:  [id number]  Authorization Number: [id number]    Admit date: 3/20/24  Admit time:  12:11 PM  Discharge Date: 3/23/2024  3:15 PM     Admitting Physician: Paulo Kearney MD  Attending Physician:  No att. providers found  Primary Care Physician: Mariaelena Valle DO         [facility]  Discharge Summary    Sanrda Roche Patient Status:  Inpatient    1965 MRN [id number]   [facility] 3NW-A Attending Paulo Kearney MD   Hosp Day # 3 PCP Mariaelena Valle DO     Date of Admission: 3/20/2024    Date of Discharge: 3/23/2024    Admitting Diagnosis: Sigmoid diverticulitis [K57.32]       Patient Active Problem List   Diagnosis    Status post right knee replacement    Primary osteoarthritis of both knees    Type 2 diabetes mellitus with hyperglycemia, without long-term current use of insulin (HCC)    Hypercholesteremia    Insomnia    Seasonal allergies    History of colon polyps    Primary hypertension    Gastroesophageal reflux disease    History of CVA (cerebrovascular accident)    History of inflammatory arthritis    Primary osteoarthritis involving multiple joints    History of diverticulitis    Need for diphtheria-tetanus-pertussis (Tdap) vaccine    Diverticulitis       Reason for Admission:  Sigmoid diverticulitis [K57.32]     Procedures: ROBOTIC LOW ANTERIOR COLON RESECTION    History of Present Illness: Sigmoid diverticulitis [K57.32]    This is a very nice 59-year-old female previously under the care of of Dr. Coles in regards to her history of recurrent/smoldering diverticulitis.  Patient presents to clinic today to establish care with me.     Patient began having issues with diverticulitis in 2023.  She was admitted to the hospital on 2023 and improved with IV antibiotics.  Dr. Coles was able to perform a colonoscopy on the patient on 2023 which revealed a tortuous  colon, multiple widemouth diverticula of the sigmoid and descending colon without any polyps or lesions.  Patient last followed up with Dr. Coles on 1/10/2024.     Over the last 2+ months, patient's had persistent left lower quadrant abdominal pain.  She also has constipation that she is controlling with MiraLAX and stool softeners.  She has had 2 recent CT scans on 9/7/2023 and 1/25/2024.  Both of these scans showed diverticulosis without diverticulitis.  No recent fevers, nausea or vomiting.  Dr. Coles prescribed her a course of Norco for pain control and was tentatively scheduling her for robotic low anterior colon resection.  Patient's had a history of a laparoscopic procedure for endometriosis.  She does not take any blood thinners.  She is currently unemployed.       Hospital Course: The patient tolerated the above listed procedure without complication. The patient is being discharged with the following physical exam.      Physical Exam: Abdomen soft, non-tender, bowel sounds present in all four quadrants. Incisions clean, dry, intact, no erythema.    Consultations: hospitalist     Complications: none     Disposition: Home to self care     Discharge Condition: Good    Discharge Medications:   Current Discharge Medication List        CONTINUE these medications which have CHANGED    Details   HYDROcodone-acetaminophen (NORCO) 5-325 MG Oral Tab Take 1 tablet by mouth every 6 (six) hours as needed for Pain.  Qty: 15 tablet, Refills: 0    Associated Diagnoses: Post-operative pain           CONTINUE these medications which have NOT CHANGED    Details   fluticasone propionate 50 MCG/ACT Nasal Suspension 2 sprays by Nasal route daily.  Qty: 16 g, Refills: 0      metFORMIN  MG Oral Tablet 24 Hr Take 1 tablet (500 mg total) by mouth daily.  Qty: 90 tablet, Refills: 3    Associated Diagnoses: Type 2 diabetes mellitus without complication, without long-term current use of insulin (HCC)      docusate sodium 100 MG  Oral Cap Take 1 capsule (100 mg total) by mouth 2 (two) times daily.  Qty: 60 capsule, Refills: 2    Associated Diagnoses: Sigmoid diverticulitis      atorvastatin 80 MG Oral Tab TAKE 1 TABLET(80 MG) BY MOUTH EVERY NIGHT  Qty: 90 tablet, Refills: 2    Associated Diagnoses: Type 2 diabetes mellitus without complication, without long-term current use of insulin (HCC); Hypercholesteremia; History of CVA (cerebrovascular accident)      Triamterene-HCTZ 37.5-25 MG Oral Tab Take 1 tablet by mouth daily.  Qty: 90 tablet, Refills: 0    Associated Diagnoses: Primary hypertension      albuterol 108 (90 Base) MCG/ACT Inhalation Aero Soln Inhale 2 puffs into the lungs 4 (four) times daily as needed.  Qty: 1 each, Refills: 0      ergocalciferol 1.25 MG (21523 UT) Oral Cap Take 1 capsule (50,000 Units total) by mouth once a week.  Qty: 6 capsule, Refills: 0      zolpidem 10 MG Oral Tab Take 1 tablet (10 mg total) by mouth nightly as needed for Sleep.  Qty: 90 tablet, Refills: 0    Associated Diagnoses: Insomnia, unspecified type      cetirizine 10 MG Oral Tab Take 1 tablet (10 mg total) by mouth daily.  Qty: 30 tablet, Refills: 0           STOP taking these medications       PEG 3350-KCl-Na Bicarb-NaCl 420 g Oral Recon Soln        neomycin 500 MG Oral Tab        metRONIDAZOLE (FLAGYL) 500 MG Oral Tab        HYDROcodone-acetaminophen (NORCO)  MG Oral Tab              Follow up Visits: Follow-up with [facility] physician assistant in approximately the next 14 days. Follow up with Dr. Kearney in 1 month    Other Discharge Instructions:    Soft diet  No lifting, no driving, the patient may shower  [place] for pain      Jayla Meyers PA-C  3/23/2024  2:23 PM

## 2024-03-25 NOTE — PROGRESS NOTES
Initial Post Discharge Follow Up   Discharge Date: 3/23/24  Contact Date: 3/25/2024    Consent Verification:  Assessment Completed With: Patient  HIPAA Verified?  Yes    Discharge Dx:   Sigmoid diverticulitis   3/20/2024 Procedures: Robotic low anterior colon resection     General:   How have you been since your discharge from the hospital? Pt feeling better, since hospital discharge--lap incisions healing--denies redness, drainage or bleeding. Pt following low fiber, soft diet, staying hydrated, using IS, as directed, independent with ADLs. Patient denies fever, chills, nausea, vomiting, diarrhea, calf pain or swelling, intractable abdominal pain, chest pain or shortness of breath at this time.  Do you have any pain since discharge?  Yes  Where: incisional   Rating on pain scale 1-10, 10 being the worst pain you have ever experienced, what is current pain: 5  Alleviating factors: rest, Ibuprofen, Hydrocodone    Aggravating factors: positional  Is the pain manageable at home? Yes  How well was your pain managed while in the hospital?   On a scale of 1-5   1- Very Poor and 5- Very well   Very Well  When you were leaving the hospital were your discharge instructions reviewed with you? Yes  How well were your discharge instructions explained to you?   On a scale of 1-5   1- Very Poor and 5- Very well   Very Well  Do you have any questions about your discharge instructions?  No  Before leaving the hospital was your diagnoses explained to you? Yes  Do you have any questions about your diagnoses? No  Are you able to perform normal daily activities of living as you have prior to your hospital stay (dressing, bathing, ambulating to the bathroom, etc)? yes  (NCM) Was patient given a different diet per AVS? no--pt following low fiber, soft diet x 4-6 weeks    Medications:   Current Outpatient Medications   Medication Sig Dispense Refill    HYDROcodone-acetaminophen (NORCO) 5-325 MG Oral Tab Take 1 tablet by mouth every 6  (six) hours as needed for Pain. 15 tablet 0    ergocalciferol 1.25 MG (35856 UT) Oral Cap Take 1 capsule (50,000 Units total) by mouth once a week. 6 capsule 0    zolpidem 10 MG Oral Tab Take 1 tablet (10 mg total) by mouth nightly as needed for Sleep. 90 tablet 0    fluticasone propionate 50 MCG/ACT Nasal Suspension 2 sprays by Nasal route daily. 16 g 0    cetirizine 10 MG Oral Tab Take 1 tablet (10 mg total) by mouth daily. 30 tablet 0    metFORMIN  MG Oral Tablet 24 Hr Take 1 tablet (500 mg total) by mouth daily. 90 tablet 3    docusate sodium 100 MG Oral Cap Take 1 capsule (100 mg total) by mouth 2 (two) times daily. 60 capsule 2    atorvastatin 80 MG Oral Tab TAKE 1 TABLET(80 MG) BY MOUTH EVERY NIGHT 90 tablet 2    Triamterene-HCTZ 37.5-25 MG Oral Tab Take 1 tablet by mouth daily. 90 tablet 0    albuterol 108 (90 Base) MCG/ACT Inhalation Aero Soln Inhale 2 puffs into the lungs 4 (four) times daily as needed. 1 each 0     Were there any changes to your current medication(s) noted on the AVS? Yes  CHANGE how you take:  cetirizine (ZyrTEC)  fluticasone propionate (Flonase)  HYDROcodone-acetaminophen (Norco)  STOP taking:  metRONIDAZOLE 500 MG Tabs (Flagyl)  neomycin 500 MG Tabs (Mycifradin)  PEG 3350-KCl-Na Bicarb-NaCl 420 g Solr (NULYTELY)  Pain: You may take acetaminophen (Tylenol) up to 650 mg every 6  hours as needed for mild-moderate pain. You may take ibuprofen  (Motrin) up to 600 mg every 6 hours as needed for mild-moderate pain.  Take narcotic pain medication as needed for severe pain per your  prescription. Do not drive while taking narcotic pain medication. Many  patients take a stool softener as narcotics are known to cause  constipation. Okay to use ice packs over abdomen  If so, were these medication changes discussed with you prior to leaving the hospital? Yes  If a new medication was prescribed:    Was the new medication's purpose & side effects reviewed? Yes  Do you have any questions about  your new medication? No  Did you  your discharge medications when you left the hospital? Yes  Let's go over your medications together to make sure we are not missing anything. Medications Reviewed  Are there any reasons that keep you from taking your medication as prescribed? No  Are you having any concerns with constipation? No  Did patient receive their flu shot (Sept-March)? No    Discharge medications reviewed/discussed/and reconciled against outpatient medications with patient.  Any changes or updates to medications sent to PCP.  Patient Acknowledged     Referrals/orders at D/C:  Referrals/orders placed at D/C? no  DME ordered at D/C? Yes  What? IS  From where? hospital  Have you received your (DME)? yes    Discharge orders, AVS reviewed and discussed with patient. Any changes or updates to orders sent to PCP.  Patient Acknowledged      SDOH:   Transportation:    Transportation Needs: No Transportation Needs (3/25/2024)    Transportation Needs     Lack of Transportation: No     Financial Strain:   Financial Resource Strain: Low Risk  (3/25/2024)    Financial Resource Strain     Difficulty of Paying Living Expenses: Not very hard     Med Affordability: No       Follow up appointments:    Follow-up Information    Follow up With Specialties Details Why Contact Info   [facility]  Follow up in 1 week(s)  [address] [facility] [address] [facility] [address]  [phone]   Paulo Kearney MD Surgery, Colon & Rectal, SURGERY, GENERAL Follow up in 1 month(s)  [address] [facility] [address]  [phone]     Your appointments       Date & Time Appointment Department ([place])    Mar 27, 2024 10:45 AM CDT Post Op Visit with [facility] PA [facility], [facility] ([facility])        Apr 08, 2024 10:20 AM CDT Procedure with Laurent Patterson PA-C [facility], [address] [place] [address] ([facility])        Apr 29, 2024 10:45  AM CDT Post Op Visit with Paulo Kearney MD [facility], [facility] ([facility])              [facility], [address] [place] [address] [employer]  [address] [facility] [address]  [facility] [address] [phone] [facility], [facility]  [address] [facility] [address]  [phone]            TCC  Was TCC ordered: No    PCP (If no TCC appointment)  Does patient already have a PCP appointment scheduled? No  NCM Attempted to schedule PCP office TCM appointment with patient   If no appointment scheduled: Explain: unable to book, d/t schedule restrictions    Specialist    Does the patient have any other follow up appointment(s) needing to be scheduled? No    Is there any reason as to why you cannot make your appointment(s)?  No     Needs post D/C:   Now that you are home, are there any needs or concerns you need addressed before your next visit with your PCP?  (DME, meds, questions, etc.): No    Interventions by NCM:   Discussed diet, activity, medications and need for f/u visits. Patient confirms specialist f/u appts, as scheduled. Patient agreeable to HFU (non-TCM) appt with Dr. Valle--this NCM unable to book within 1 week timeframe, per provider's protocol, d/t schedule restrictions--sent TE to office staff for appt clarifiicition and f/u.  Patient aware when to contact PCP/specialists and when to seek emergency care. No further questions/concerns at this time.    Overall Rating:   How would you rate the care you received while in the hospital? excellent    CCM referral placed:    Not Applicable    BOOK BY DATE: 4/06/2024

## 2024-03-25 NOTE — TELEPHONE ENCOUNTER
Pt scheduled for HFU below    Future Appointments   Date Time Provider Department Center   4/2/2024 11:20 AM Mariaelena Valle,  [facility]

## 2024-03-26 NOTE — TELEPHONE ENCOUNTER
Form placed in front office for . Sent copy to scan. Monse Park is a 63 year old who is being evaluated via a billable video visit.      How would you like to obtain your AVS? MyChart  If the video visit is dropped, the invitation should be resent by: Text to cell phone: [phone]  Will anyone else be joining your video visit? No  If patient encounters technical issues they should call [phone]    During this virtual visit the patient is located in [place], patient verifies this as the location during the entirety of this visit.     No pain, 0/10.    Geremias Souza, EMT 3/26/2024      8:00 AM

## 2024-03-27 ENCOUNTER — OFFICE VISIT (OUTPATIENT)
Facility: LOCATION | Age: 59
End: 2024-03-27
Payer: MEDICAID

## 2024-03-27 VITALS
TEMPERATURE: 97 F | HEART RATE: 60 BPM | WEIGHT: 174 LBS | OXYGEN SATURATION: 98 % | BODY MASS INDEX: 27.31 KG/M2 | HEIGHT: 67 IN | RESPIRATION RATE: 18 BRPM

## 2024-03-27 DIAGNOSIS — Z98.890 POSTOPERATIVE STATE: Primary | ICD-10-CM

## 2024-03-27 DIAGNOSIS — K57.32 SIGMOID DIVERTICULITIS: ICD-10-CM

## 2024-03-27 PROCEDURE — 99024 POSTOP FOLLOW-UP VISIT: CPT

## 2024-03-27 NOTE — PROGRESS NOTES
Follow Up Visit Note       Active Problems      1. Postoperative state    2. Sigmoid diverticulitis          Chief Complaint   Chief Complaint   Patient presents with    Post-Op     PO-robotic low anterior colon resection. States that she is being having slight pain         History of Present Illness    Sandra Segura is a 59-year-old female who presents to clinic for continued care and evaluation following robotic low anterior colon resection for diverticulitis on 8/20/2024 with Dr. Kearney.    She reports doing well postoperatively and has noticed improvement daily.  She continues to experience mild incisional pain specifically near her Pfannenstiel incision site.  She is taking Norco as needed and supplementing with extra strength Tylenol and Motrin.  She denies nausea or vomiting.  She is tolerating a diet without issues.  She has noticed improvement in her bowel movements since discharge.  They have become more formed and bulky.  She states she has 2-3 bowel movements daily.  She denies urinary symptoms.  She denies fevers or chills.     Allergies  Sandra is allergic to seasonal.    Past Medical / Surgical / Social / Family History    The past medical and past surgical history have been reviewed by me today.    Past Medical History:   Diagnosis Date    Diabetes (HCC)     Diverticulosis of large intestine     Esophageal reflux     Essential hypertension     High blood pressure     High cholesterol     Osteoarthritis     Pneumonia due to organism     Stroke (HCC)     2008    Visual impairment     READING GLASSES     Past Surgical History:   Procedure Laterality Date    COLONOSCOPY N/A 9/7/2023    Procedure: COLONOSCOPY;  Surgeon: Evelyn Coles MD;  Location:  ENDOSCOPY    EXPLORATORY OF ABDOMEN      KNEE REPLACEMENT SURGERY         The family history and social history have been reviewed by me today.    Family History   Problem Relation Age of Onset    Heart Disorder Father     Cancer Mother      Hypertension Mother      Social History     Socioeconomic History    Marital status: Legally    Tobacco Use    Smoking status: Every Day     Packs/day: .1     Types: Cigarettes    Smokeless tobacco: Never   Vaping Use    Vaping Use: Never used   Substance and Sexual Activity    Alcohol use: Yes     Comment: occasional    Drug use: Never        Current Outpatient Medications:     HYDROcodone-acetaminophen (NORCO) 5-325 MG Oral Tab, Take 1 tablet by mouth every 6 (six) hours as needed for Pain., Disp: 15 tablet, Rfl: 0    ergocalciferol 1.25 MG (37901 UT) Oral Cap, Take 1 capsule (50,000 Units total) by mouth once a week., Disp: 6 capsule, Rfl: 0    zolpidem 10 MG Oral Tab, Take 1 tablet (10 mg total) by mouth nightly as needed for Sleep., Disp: 90 tablet, Rfl: 0    fluticasone propionate 50 MCG/ACT Nasal Suspension, 2 sprays by Nasal route daily., Disp: 16 g, Rfl: 0    cetirizine 10 MG Oral Tab, Take 1 tablet (10 mg total) by mouth daily., Disp: 30 tablet, Rfl: 0    metFORMIN  MG Oral Tablet 24 Hr, Take 1 tablet (500 mg total) by mouth daily., Disp: 90 tablet, Rfl: 3    docusate sodium 100 MG Oral Cap, Take 1 capsule (100 mg total) by mouth 2 (two) times daily., Disp: 60 capsule, Rfl: 2    atorvastatin 80 MG Oral Tab, TAKE 1 TABLET(80 MG) BY MOUTH EVERY NIGHT, Disp: 90 tablet, Rfl: 2    Triamterene-HCTZ 37.5-25 MG Oral Tab, Take 1 tablet by mouth daily., Disp: 90 tablet, Rfl: 0    albuterol 108 (90 Base) MCG/ACT Inhalation Aero Soln, Inhale 2 puffs into the lungs 4 (four) times daily as needed., Disp: 1 each, Rfl: 0     Review of Systems  The Review of Systems has been reviewed by me during today.  Review of Systems   Constitutional:  Negative for chills, diaphoresis, fatigue, fever and unexpected weight change.   HENT:  Negative for hearing loss, nosebleeds, sore throat and trouble swallowing.    Respiratory:  Negative for apnea, cough, shortness of breath and wheezing.    Cardiovascular:  Negative  for chest pain, palpitations and leg swelling.   Gastrointestinal:  Negative for abdominal distention, abdominal pain, anal bleeding, blood in stool, constipation, diarrhea, nausea and vomiting.   Genitourinary:  Negative for difficulty urinating, dysuria, frequency and urgency.   Musculoskeletal:  Negative for arthralgias and myalgias.   Skin:  Negative for color change and rash.   Neurological:  Negative for tremors, syncope and weakness.   Hematological:  Negative for adenopathy. Does not bruise/bleed easily.   Psychiatric/Behavioral:  Negative for behavioral problems and sleep disturbance.         Physical Findings   Pulse 60   Temp 96.9 °F (36.1 °C) (Temporal)   Resp 18   Ht 67\"   Wt 174 lb (78.9 kg)   SpO2 98%   BMI 27.25 kg/m²   Physical Exam  Constitutional:       Appearance: Normal appearance.   HENT:      Head: Normocephalic and atraumatic.   Eyes:      Extraocular Movements: Extraocular movements intact.      Pupils: Pupils are equal, round, and reactive to light.   Pulmonary:      Effort: Pulmonary effort is normal. No respiratory distress.   Abdominal:      General: Abdomen is flat. Bowel sounds are normal. There is no distension.      Palpations: Abdomen is soft. There is no mass.      Tenderness: There is no abdominal tenderness. There is no guarding or rebound.          Comments: Abdomen is soft, nondistended, nontender to palpation.  No rebound or guarding.  No peritoneal signs.    Robotic and Pfannenstiel incision sites are dry, clean, and intact with skin glue in place.  No surrounding erythema or drainage.  No signs of infection.   Musculoskeletal:         General: Normal range of motion.      Cervical back: Normal range of motion.   Skin:     General: Skin is warm.      Coloration: Skin is not jaundiced or pale.      Findings: No erythema.   Neurological:      General: No focal deficit present.      Mental Status: She is alert and oriented to person, place, and time.       Pathology  Final  Diagnosis:   Sigmoid colon, partial rectum:  -Diverticulosis.  -No sign of active diverticulitis, pericolonic abscess, perforation, dysplasia, or malignancy.        Assessment   1. Postoperative state    2. Sigmoid diverticulitis        Plan   Overall, the patient continues to do well postoperatively.  I advised her it is very common to have incisional pain/soreness for about 4 to 6 weeks after surgery.  Continue p.o. Tylenol and Motrin as needed for pain control.  Continue Norco as needed for breakthrough pain.  Continue diet as tolerated.  Continue to wash incision sites with soap and water daily. Skin glue will begin to flake off by itself.  She is to maintain a 20 pound lifting restriction until 6 weeks after surgery.  Surgical pathology reviewed during today's visit.  She is to follow-up with Dr. Kearney for 1 month postop visit.  All the patient questions were addressed.  She expressed understanding and is in agreement this plan.     No orders of the defined types were placed in this encounter.      Imaging & Referrals   None    Follow Up  No follow-ups on file.    FRANKIE Walker

## 2024-04-02 ENCOUNTER — OFFICE VISIT (OUTPATIENT)
Dept: INTERNAL MEDICINE CLINIC | Facility: CLINIC | Age: 59
End: 2024-04-02
Payer: MEDICAID

## 2024-04-02 VITALS
OXYGEN SATURATION: 96 % | DIASTOLIC BLOOD PRESSURE: 64 MMHG | BODY MASS INDEX: 26.53 KG/M2 | WEIGHT: 169 LBS | HEART RATE: 81 BPM | SYSTOLIC BLOOD PRESSURE: 110 MMHG | HEIGHT: 67 IN

## 2024-04-02 DIAGNOSIS — E78.2 MIXED HYPERLIPIDEMIA: ICD-10-CM

## 2024-04-02 DIAGNOSIS — E11.9 TYPE 2 DIABETES MELLITUS WITHOUT COMPLICATION, WITHOUT LONG-TERM CURRENT USE OF INSULIN (HCC): ICD-10-CM

## 2024-04-02 DIAGNOSIS — R30.9 PAIN WITH URINATION: ICD-10-CM

## 2024-04-02 DIAGNOSIS — I10 PRIMARY HYPERTENSION: ICD-10-CM

## 2024-04-02 DIAGNOSIS — Z90.49 STATUS POST COLON RESECTION: Primary | ICD-10-CM

## 2024-04-02 LAB
BILIRUB UR QL STRIP.AUTO: NEGATIVE
CLARITY UR REFRACT.AUTO: CLEAR
GLUCOSE UR STRIP.AUTO-MCNC: NORMAL MG/DL
KETONES UR STRIP.AUTO-MCNC: NEGATIVE MG/DL
LEUKOCYTE ESTERASE UR QL STRIP.AUTO: NEGATIVE
NITRITE UR QL STRIP.AUTO: NEGATIVE
PH UR STRIP.AUTO: 6 [PH] (ref 5–8)
PROT UR STRIP.AUTO-MCNC: NEGATIVE MG/DL
RBC UR QL AUTO: NEGATIVE
SP GR UR STRIP.AUTO: 1.01 (ref 1–1.03)
UROBILINOGEN UR STRIP.AUTO-MCNC: NORMAL MG/DL

## 2024-04-02 PROCEDURE — 99214 OFFICE O/P EST MOD 30 MIN: CPT | Performed by: INTERNAL MEDICINE

## 2024-04-02 PROCEDURE — 81003 URINALYSIS AUTO W/O SCOPE: CPT | Performed by: INTERNAL MEDICINE

## 2024-04-02 RX ORDER — ATORVASTATIN CALCIUM 40 MG/1
40 TABLET, FILM COATED ORAL NIGHTLY
Qty: 90 TABLET | Refills: 0 | Status: SHIPPED | OUTPATIENT
Start: 2024-04-02

## 2024-04-02 NOTE — PROGRESS NOTES
Sandra Roche  2/1/1965    Chief Complaint   Patient presents with    Hospital F/U     Room 15   Hospital f/u for diverticulitis       SUBJECTIVE   Sandra Roche is a 59 year old female who presents for hospital follow up. She was admitted from 3/20-3/23 for low anterior colon resection for sigmoid diverticulitis. She saw PEDRO MATHEW on 3/27 for post-op follow up.    Her abdominal pain has resolved. She has pain at incision sight that is exacerbated with urination. Per patient she was retaining after Cowan removed while inpatient?    While inpatient and then afterwards her BP has been low/normal. She denies lightheadedness, dizziness, etc but just felt better off the medication.    She would also like the dose of Atorvastatin to be lowered.    Had TDaP 3 years ago in [place].    Review of Systems   Review of Systems   No f/c/chest pain or sob. No cough. No abd pain/n/v/d. No ha or dizziness. No numbness, tingling, or weakness. No other complaints today.    OBJECTIVE:   /64   Pulse 81   Ht 5' 7\" (1.702 m)   Wt 169 lb (76.7 kg)   SpO2 96%   BMI 26.47 kg/m²   Physical Exam   Constitutional: Oriented to person, place, and time. No distress.   Cardiovascular: Normal rate, regular rhythm and intact distal pulses.  No murmur, rubs or gallops.   Pulmonary/Chest: Effort normal and breath sounds normal. No respiratory distress.  Abdominal: Soft. Bowel sounds are normal. Mildly tender to palpation shaquille in suprapubic region near incision site.    Lab Results   Component Value Date     (H) 03/22/2024    BUN 7 (L) 03/22/2024    CREATSERUM 0.73 03/22/2024    ANIONGAP 0 03/22/2024    CA 9.1 03/22/2024     03/22/2024    K 3.5 03/22/2024     03/22/2024    CO2 27.0 03/22/2024    OSMOCALC 287 03/22/2024      Lab Results   Component Value Date    WBC 10.0 03/22/2024    RBC 3.93 03/22/2024    HGB 10.4 (L) 03/22/2024    HCT 33.0 (L) 03/22/2024    MCV 84.0 03/22/2024    MCH 26.5 03/22/2024    MCHC 31.5 03/22/2024     RDW 13.6 03/22/2024    .0 03/22/2024      Lab Results   Component Value Date    TSH 2.760 09/27/2023        ASSESSMENT AND PLAN:       ICD-10-CM    1. Status post colon resection  Z90.49 Doing well. Incision is well healed. Follow up Surgery.      2. Pain with urination  R30.9 Urinalysis, Routine [E]     Urinalysis, Routine [E]     CANCELED: UA/M With Culture Reflex [E]   Likely 2/2 pain from incision. She only gave a small amount of urine so will check urinalysis and if bacteria then will have her return to lab for additional specimen. Per chart review highest  mL. Consider repeat bladder US if symptoms persist.   3. Type 2 diabetes mellitus without complication, without long-term current use of insulin (HCC)  E11.9 atorvastatin 40 MG Oral Tab     Diabetic Retinopathy Exam  OU - Both Eyes   Patient will return for retinal screen. She recalls doing this already but there is no record in Epic? Need to make sure insurance is not billed x 2.   4. Mixed hyperlipidemia  E78.2 atorvastatin 40 MG Oral Tab   Last lipid panel looks good. She would like to decrease Atorvastatin from 80 mg to 40 mg.   5. Primary hypertension  I10 Low normal now. Will try stopping BP med. Patient instructed to check her BP at home. If > 120/80 then we will resume monotherapy as opposed to her current Triamterene-hydrochlorothiazide, poss CCB or ACEi/ARB in DM.            The patient indicates understanding of these issues and agrees to the plan.  The patient is asked to return or present to the emergency room for worsening of symptoms.    TODAY'S ORDERS     No orders of the defined types were placed in this encounter.      Meds & Refills:  Requested Prescriptions      No prescriptions requested or ordered in this encounter       Imaging & Consults:  None    No follow-ups on file.  There are no Patient Instructions on file for this visit.    All questions were answered and the patient agrees with the plan.     Thank you,  Mariaelena  Candy Valle, DO

## 2024-04-05 ENCOUNTER — OFFICE VISIT (OUTPATIENT)
Facility: LOCATION | Age: 59
End: 2024-04-05
Payer: MEDICAID

## 2024-04-05 VITALS — TEMPERATURE: 97 F

## 2024-04-05 DIAGNOSIS — Z98.890 POST-OPERATIVE STATE: Primary | ICD-10-CM

## 2024-04-05 PROCEDURE — 99024 POSTOP FOLLOW-UP VISIT: CPT | Performed by: PHYSICIAN ASSISTANT

## 2024-04-08 ENCOUNTER — NURSE ONLY (OUTPATIENT)
Dept: INTERNAL MEDICINE CLINIC | Facility: CLINIC | Age: 59
End: 2024-04-08
Payer: MEDICAID

## 2024-04-08 ENCOUNTER — OFFICE VISIT (OUTPATIENT)
Dept: ORTHOPEDICS CLINIC | Facility: CLINIC | Age: 59
End: 2024-04-08
Payer: MEDICAID

## 2024-04-08 VITALS — BODY MASS INDEX: 26.53 KG/M2 | HEIGHT: 67 IN | WEIGHT: 169 LBS

## 2024-04-08 DIAGNOSIS — G89.29 CHRONIC KNEE PAIN AFTER TOTAL REPLACEMENT OF RIGHT KNEE JOINT: ICD-10-CM

## 2024-04-08 DIAGNOSIS — E11.9 TYPE 2 DIABETES MELLITUS WITHOUT COMPLICATION, WITHOUT LONG-TERM CURRENT USE OF INSULIN (HCC): ICD-10-CM

## 2024-04-08 DIAGNOSIS — Z96.651 CHRONIC KNEE PAIN AFTER TOTAL REPLACEMENT OF RIGHT KNEE JOINT: ICD-10-CM

## 2024-04-08 DIAGNOSIS — M17.12 PRIMARY OSTEOARTHRITIS OF LEFT KNEE: Primary | ICD-10-CM

## 2024-04-08 DIAGNOSIS — M25.561 CHRONIC KNEE PAIN AFTER TOTAL REPLACEMENT OF RIGHT KNEE JOINT: ICD-10-CM

## 2024-04-08 RX ORDER — TRIAMCINOLONE ACETONIDE 40 MG/ML
40 INJECTION, SUSPENSION INTRA-ARTICULAR; INTRAMUSCULAR ONCE
Status: COMPLETED | OUTPATIENT
Start: 2024-04-08 | End: 2024-04-08

## 2024-04-08 RX ADMIN — TRIAMCINOLONE ACETONIDE 40 MG: 40 INJECTION, SUSPENSION INTRA-ARTICULAR; INTRAMUSCULAR at 10:38:00

## 2024-04-08 NOTE — PROCEDURES
Patient inquired about treatment options for right knee pain.  Patient's right knee has been replaced twice in the past without any relief from either replacement surgery.  Discussed pain management referral as it has been previously evaluated with radiographs without any hardware abnormalities.  I did discuss potential radiofrequency ablation with the patient.  Physical therapy referral also placed for the patient's left knee.    After informed consent, the patient's left knee was marked, locally anesthetized with skin refrigerant, prepped with topical antiseptic, and injected with 6mL of 48mg/6mL Synvisc One and a mixture of 1mL 40mg/mL Kenalog, 2mL 0.5% marcaine, and 2mL of 1% lidocaine through the inferolateral portal.  A band-aid was applied.  The patient tolerated the procedure well.    Laurent Patterson PA-C  [facility]

## 2024-04-11 ENCOUNTER — TELEPHONE (OUTPATIENT)
Dept: INTERNAL MEDICINE CLINIC | Facility: CLINIC | Age: 59
End: 2024-04-11

## 2024-04-11 NOTE — TELEPHONE ENCOUNTER
Pt was referred to see Madhavi Hightower but they don't accept her insurance. She needs another recommendation.

## 2024-04-11 NOTE — TELEPHONE ENCOUNTER
Called patient and advised to contact insurance company to find out what Ophthalmologists are in network.  Call back with name(s) and we will place a new referral.  She confirms understanding.

## 2024-04-12 ENCOUNTER — NURSE TRIAGE (OUTPATIENT)
Dept: INTERNAL MEDICINE CLINIC | Facility: CLINIC | Age: 59
End: 2024-04-12

## 2024-04-12 RX ORDER — AMLODIPINE BESYLATE 5 MG/1
5 TABLET ORAL DAILY
Qty: 30 TABLET | Refills: 0 | Status: SHIPPED | OUTPATIENT
Start: 2024-04-12

## 2024-04-12 NOTE — TELEPHONE ENCOUNTER
Pt returned call. She had HA so she took triamterene-hydrochlorothiazide 1-2 hrs ago. Had pt rpt BP while on the phone, BP was 141/100. HA has improved, but still has mild HA. Informed pt of amlodipine and plan was to start this today, then call/message over weekend with BP readings. Informed her will discuss with MP now that she has taken triamterene-hydrochlorothiazide and get back to her. Pt stated understanding, stated she will not be able to go get amlodipine until this evening when her daughter gets off of work.     Routing to MP to update. Pt took triamterene-hydrochlorothiazide but BP still elevated. Recommend taking amlodipine when she is able to get it and call if still elevated (pt not on , would need to page on call)? MONIE agrawal?

## 2024-04-12 NOTE — TELEPHONE ENCOUNTER
Rx sent for Amlodipine 5 mg. Start today if she can. Please call or message with BP readings over the weekend. If still high like today then can also take her Triamterene -hydrochlorothiazide

## 2024-04-12 NOTE — TELEPHONE ENCOUNTER
Pt stated she's been having high BP.  Pt stated her last reading was 140/100, this was 10min ago.    135/98 - yesterday reading, this was in the am.  Pt stated she doesn't have any other sxs.  High TE

## 2024-04-12 NOTE — TELEPHONE ENCOUNTER
Mariaelena Valle, DO  [facility] Clinical Staff2 hours ago (2:13 PM)       Let's keep her on the Triamterene-hydrochlorothiazide since it's controlled her BP well all this time. Can add Amlodipine if still elevated over the weekend. Should come back down.   BP improve to 120/80 when she got off the phone with our staff and right now feeling well.    Discussed amlodipine sent to pharmacy if bp elevates over the weekend. Patient voiced understanding.

## 2024-04-12 NOTE — TELEPHONE ENCOUNTER
LOV 4/2/24    Pt was instructed by MP to stop taking Triamterene/hydrochlorothiazide at last OV. She has had some high bp readings yesterday and today. /100 this am and 143/97 while on the phone with triager. Pt asking if she needs to restart bp meds?    Routing to MP for review and recommendations

## 2024-04-15 ENCOUNTER — OFFICE VISIT (OUTPATIENT)
Dept: PHYSICAL THERAPY | Facility: HOSPITAL | Age: 59
End: 2024-04-15
Attending: PHYSICIAN ASSISTANT
Payer: MEDICAID

## 2024-04-15 DIAGNOSIS — M17.12 PRIMARY OSTEOARTHRITIS OF LEFT KNEE: Primary | ICD-10-CM

## 2024-04-15 PROCEDURE — 97162 PT EVAL MOD COMPLEX 30 MIN: CPT | Performed by: PHYSICAL THERAPIST

## 2024-04-15 PROCEDURE — 97110 THERAPEUTIC EXERCISES: CPT | Performed by: PHYSICAL THERAPIST

## 2024-04-15 RX ORDER — AMLODIPINE BESYLATE 5 MG/1
5 TABLET ORAL DAILY
Qty: 90 TABLET | Refills: 0 | OUTPATIENT
Start: 2024-04-15

## 2024-04-15 NOTE — PROGRESS NOTES
LOWER EXTREMITY EVALUATION:     Diagnosis:   Primary osteoarthritis of left knee (M17.12)       Referring Provider: Laurent Patterson  Date of Evaluation:    4/15/2024    Precautions:  None Next MD visit:   none scheduled  Date of Surgery: n/a     PATIENT SUMMARY   Sandra Roche is a 59 year old female who presents to therapy today with complaints of right knee pain .  S/p TKR 2021 and didn't have therapy for personal reasons afterward.      Moved up to [place] , had PT .  Attended  for OP PT and it helped, but has not been consistent with her HEP after she was discharged. .  \"It got better, but is now feeling like its tight again and stiff.  \"    Saw MD, and he is \"referring me to Pain management to help figure out how to help my knee pain \"       Current complaint  is of right knee pain, increased with  bending ;  Pain is bad when trying to bend it , feels like something is in knee, makes it tight.  Is going to be working with pain management for her knee pain   Left knee pain , no knee replacement  has had the gel and cortisone shot . Shots helped .   Was in the hospital had diverticulitis and had surgery to remove small colon.   Can't get comfortable during night    Stretching it makes it painful .   Don't know where the pain is coming .    Pain was improving during course of therapy   Stopped doing exercise when she stopped therapy .   Tries to walk up stairs reciprocal but coming down is hard  Bed is upstair  so I have to use the stairs a couple of times a day to get up and down .   Car transfers are  ok.  No problem with getting dressed, feels tish my right leg gets tangle up in my clothes if I dress the left side first.      Pain pills ease .   Takes it when she is ready for bed.   Is good about taking medications  wakes up 3-5 x/week knee sleeps with pillow underneath her knee. Has to sleep with leg sideways.         Pain is deep inside my right knee.   Pt describes pain level current 10/10, at  best 8/10, at worst 9/10.   Current functional limitations include limited with increased time , difficulty with completing home ,self care activities. Walking , carrying items .     Sandra describes prior level of function was completing PT until recently;  did not have PT immediately following TKR surgery . . Pt goals include to improve my walking , want to cathy the knee with walking and bend it like the regular leg.   Pain would go away .   .  Past medical history was reviewed with Sandra. Significant findings include Past Medical History:  No date: Diabetes (HCC)  No date: Diverticulosis of large intestine  No date: Esophageal reflux  No date: Essential hypertension  No date: High blood pressure  No date: High cholesterol  No date: Osteoarthritis  No date: Pneumonia due to organism  No date: Stroke (HCC)      Comment:  2008  No date: Visual impairment      Comment:  READING GLASSES     ASSESSMENT  Sandra presents to physical therapy evaluation with primary c/o right knee pain , discomfort ; pt s/p TKR right knee in 2021, and did not complete her therapy due to personal reasons (death of son ).  She presented to PT last year with complaints of knee pain ,discomfort that improved with therapy and ROM, strength improvement overall.  She was discharged in 12/23 and subjectively Sandra noted decreased pain ,discomfort overall.  Currently Sandra notes return of pain and discomfort in her right knee, but also acknowledges that she is not consistent with her HEP.  She notes return of knee pain and discomfort shaquille in her ability to climb stairs, home and self care activities.  The results of the objective tests and measures show loss of ROM from her DC summary , poor strength, poor control, proximal hip weakness. .  Functional deficits include but are not limited to stairs, strength of her right knee ...  Signs and symptoms are consistent with diagnosis of s/p right TKR 2021 with residual weakness , discomfort, pain , loss of  ROM and function strength . . Pt and PT discussed evaluation findings, pathology, POC and HEP.  Pt voiced understanding and performs HEP correctly without reported pain. Skilled Physical Therapy is medically necessary to address the above impairments and reach functional goals.     OBJECTIVE:   Observation: antalgic pattern   Palpation: poor VMO   Sensation: intact     AROM: (* denotes performed with pain)  Hip Knee Foot/Ankle   Flexion: R nl; L nl  Extension: R nl; L nl  Abduction: R nl; L nl  ER: R nl; L nl  IR: R nl; L nl Flexion: R 92; L nl  Extension: R -9; L nl     DF: R nl; L nl  PF: R nl; L nl  INV: R nl; L nl  EV: R nl; L nl  Great toe ext: R nl; L nl     Accessory motion: Poor VMO control with right quad isometrics .    Circumference: jt. Line right  40.1 cm  Left 34.6    Flexibility:    Hamstrings: R 50; L 50    Strength/MMT: (* denotes performed with pain)  Hip Knee Foot/Ankle   Flexion: R 4/5; L 4/5  Extension: R 4/5; L 4/5  Abduction: R 4/5; L 4/5  ER: R 4/5; L 4/5  IR: R 4/5; L 4/5 Flexion: R 4/5; L 4/5  Extension: R 4/5; L 4/5    DF: R 5/5; L 5/5  PF: R 5/5; L 5/5  INV: R 5/5; L 5/5  EV: R 5/5; L 5/5  Great toe ext: R 5/5; L 5/5         Gait: pt ambulates on level ground with assistive device of SPC, antalgia, and decreased step length right le 2  Balance: SLS: R 5 sec, L 15 sec    Today’s Treatment and Response:   Pt education was provided on exam findings, treatment diagnosis, treatment plan, expectations, and prognosis. Pt was also provided recommendations for activity modifications, possible soreness after evaluation, modalities as needed [ice/heat], postural corrections, ergonomics, pain science education , detrimental fear avoidance behaviors, importance of remaining active, strategies to reduce fall risk at home, and shoe wear.  Patient was instructed in and issued a HEP for: review of hep .  See patient instructions .   Access Code: [email or web address]  URL:  [email or web address]  Date: 04/15/2024  Prepared by: Lolis Szymanski    Exercises  - Clamshell  - 1 x daily - 7 x weekly - 2 sets - 10 reps  - Sidelying Hip Abduction  - 1 x daily - 7 x weekly - 2 sets - 10 reps    Charges: PT Eval Moderate ComplexityTE , 45      Total Timed Treatment: 45 min     Total Treatment Time: 45 min     Based on clinical rationale and outcome measures, this evaluation involved Moderate Complexity decision making due to 3+ personal factors/comorbidities, 3 body structures involved/activity limitations, and evolving symptoms including changing pain levels and strength, ROM of the right knee .  PLAN OF CARE:    Goals: (to be met in 14 visits)  Pt will improve knee extension ROM to 0 deg to allow proper heel strike during gait and terminal knee extension in stance   Pt will improve knee AROM flexion to >92 degrees to improve ability to perform home , self care, transfers    Pt will improve quad strength to 5/5 to ascend 1 flight of stairs reciprocally without UE assist   Pt will increase hip and knee strength to grossly 4+/5 to be able to get up and down from the floor safely   Pt will demonstrate increased hip ER/ABD strength to 5/5 to perform stepping and squatting activities without excessive femoral IR/ADD   Pt will improve SLS to >5s to improve safety with gait on uneven surfaces such as grass and gravel  Pt will be independent and compliant with comprehensive HEP to maintain progress achieved in PT      Frequency / Duration: Patient will be seen for 2 x/week or a total of 14 visits over a 90 day period. Treatment will include: Gait training, Manual Therapy, Neuromuscular Re-education, Self-Care Home Management, Therapeutic Activities, Therapeutic Exercise, and Home Exercise Program instruction    Education or treatment limitation: None  Rehab Potential:good    LEFS Score  LEFS Score: 23.75 % (4/15/2024 12:28 PM)      Patient/Family/Caregiver was advised of these findings,  precautions, and treatment options and has agreed to actively participate in planning and for this course of care.    Thank you for your referral. Please co-sign or sign and return this letter via fax as soon as possible to [phone]. If you have any questions, please contact me at Dept: [phone]    Sincerely,  Electronically signed by therapist: Lolis Szymanski PT  Physician's certification required: Yes  I certify the need for these services furnished under this plan of treatment and while under my care.    X___________________________________________________ Date____________________    Certification From: 4/15/2024  To:7/14/2024

## 2024-04-16 ENCOUNTER — TELEPHONE (OUTPATIENT)
Dept: ORTHOPEDICS CLINIC | Facility: CLINIC | Age: 59
End: 2024-04-16

## 2024-04-16 DIAGNOSIS — M79.674 PAIN OF TOE OF RIGHT FOOT: Primary | ICD-10-CM

## 2024-04-16 NOTE — TELEPHONE ENCOUNTER
Patient is coming in for Right Foot Big Toe Pain    No xray's or imaging completed.    Future Appointments   Date Time Provider Department Center   4/17/2024 11:00 AM Mariaelena Valle,  [facility]   4/18/2024  3:45 PM Jeanette Rao, PTA [facility]  [facility]   4/22/2024  1:45 PM Paulo Kearney MD [id number]   4/23/2024  2:30 PM Barbara Inman, DPM [facility]   4/25/2024 11:45 AM Lolis Szymanski PT  PHYS  [facility]   4/26/2024 11:00 AM Bipin Brown MD [facility]   5/3/2024  2:30 PM Lolis Szymanski, PT  PHYS  [facility]   5/6/2024  8:00 AM Lolis Szymanski PT  PHYS  [facility]   5/10/2024 11:15 AM Lolis Szymanski, PT  [facility]

## 2024-04-17 ENCOUNTER — OFFICE VISIT (OUTPATIENT)
Dept: INTERNAL MEDICINE CLINIC | Facility: CLINIC | Age: 59
End: 2024-04-17
Payer: MEDICAID

## 2024-04-17 ENCOUNTER — TELEPHONE (OUTPATIENT)
Dept: INTERNAL MEDICINE CLINIC | Facility: CLINIC | Age: 59
End: 2024-04-17

## 2024-04-17 VITALS
HEART RATE: 55 BPM | OXYGEN SATURATION: 92 % | DIASTOLIC BLOOD PRESSURE: 58 MMHG | HEIGHT: 66.14 IN | TEMPERATURE: 97 F | BODY MASS INDEX: 27.93 KG/M2 | SYSTOLIC BLOOD PRESSURE: 104 MMHG | WEIGHT: 173.81 LBS | RESPIRATION RATE: 18 BRPM

## 2024-04-17 DIAGNOSIS — I10 PRIMARY HYPERTENSION: ICD-10-CM

## 2024-04-17 DIAGNOSIS — H61.22 IMPACTED CERUMEN OF LEFT EAR: ICD-10-CM

## 2024-04-17 DIAGNOSIS — R30.9 PAIN WITH URINATION: ICD-10-CM

## 2024-04-17 DIAGNOSIS — R10.30 LOWER ABDOMINAL PAIN: Primary | ICD-10-CM

## 2024-04-17 DIAGNOSIS — E11.9 TYPE 2 DIABETES MELLITUS WITHOUT COMPLICATION, WITHOUT LONG-TERM CURRENT USE OF INSULIN (HCC): ICD-10-CM

## 2024-04-17 DIAGNOSIS — H90.0 CONDUCTIVE HEARING LOSS, BILATERAL: ICD-10-CM

## 2024-04-17 DIAGNOSIS — Z98.890 HISTORY OF ABDOMINAL SURGERY: ICD-10-CM

## 2024-04-17 DIAGNOSIS — H92.03 OTALGIA OF BOTH EARS: ICD-10-CM

## 2024-04-17 DIAGNOSIS — R06.00 DYSPNEA, UNSPECIFIED TYPE: ICD-10-CM

## 2024-04-17 PROBLEM — Z87.891 HAS SMOKED CIGARETTES WITHIN PRIOR YEAR: Status: ACTIVE | Noted: 2024-04-17

## 2024-04-17 LAB
APPEARANCE: CLEAR
BILIRUBIN: NEGATIVE
GLUCOSE (URINE DIPSTICK): NEGATIVE MG/DL
KETONES (URINE DIPSTICK): NEGATIVE MG/DL
LEUKOCYTES: NEGATIVE
MULTISTIX LOT#: NORMAL NUMERIC
NITRITE, URINE: NEGATIVE
OCCULT BLOOD: NEGATIVE
PH, URINE: 7 (ref 4.5–8)
PROTEIN (URINE DIPSTICK): NEGATIVE MG/DL
SPECIFIC GRAVITY: 1.02 (ref 1–1.03)
URINE-COLOR: YELLOW
UROBILINOGEN,SEMI-QN: 1 MG/DL (ref 0–1.9)

## 2024-04-17 PROCEDURE — 99214 OFFICE O/P EST MOD 30 MIN: CPT | Performed by: INTERNAL MEDICINE

## 2024-04-17 PROCEDURE — 69209 REMOVE IMPACTED EAR WAX UNI: CPT | Performed by: INTERNAL MEDICINE

## 2024-04-17 PROCEDURE — 81003 URINALYSIS AUTO W/O SCOPE: CPT | Performed by: INTERNAL MEDICINE

## 2024-04-17 NOTE — PROGRESS NOTES
Sandra Roche  2/1/1965    Chief Complaint   Patient presents with    Abdominal Pain     ES rm - 14 - lower abdominal pain     SUBJECTIVE   Bilateral ears hurts and feels like they need to be cleaned out. No associated URI symptoms such as cough or rhinorrhea.    Still has lower abdominal pain with urinating. She is urinating more often that normal. Sometimes only a few drops of urine come out. 4/2 urinalysis was normal.    Has shoes off for DM foot examination.     Sometimes feels shortness of breath when flustered or short of breath. Denies associated chest pain or dyspnea with exertion.     Review of Systems   Review of Systems   No f/c/chest pain or sob. No cough. No abd pain/n/v/d. No ha or dizziness. No numbness, tingling, or weakness. No other complaints today.    OBJECTIVE:   /58 (BP Location: Left arm, Patient Position: Sitting, Cuff Size: large)   Pulse 55   Temp 97 °F (36.1 °C) (Temporal)   Resp 18   Ht 5' 6.14\" (1.68 m)   Wt 173 lb 12.8 oz (78.8 kg)   SpO2 92%   BMI 27.93 kg/m²   Physical Exam   Constitutional: Oriented to person, place, and time. No distress.   HEENT:  Normocephalic and atraumatic.TM's wnl.  Nose normal. Oropharynx is clear and moist.   Eyes: Conjunctivae wnl.   Neck: Normal range of motion. Neck supple.   Cardiovascular: Normal rate, regular rhythm and intact distal pulses.  No murmur, rubs or gallops.   Pulmonary/Chest: Effort normal and breath sounds normal. No respiratory distress.  Bilateral barefoot skin diabetic exam is normal, visualized feet and the appearance is normal.  Bilateral monofilament/sensation of both feet is normal.  Pulsation pedal pulse exam of both lower legs/feet is normal as well.    Lab Results   Component Value Date     (H) 03/22/2024    BUN 7 (L) 03/22/2024    CREATSERUM 0.73 03/22/2024    ANIONGAP 0 03/22/2024    CA 9.1 03/22/2024     03/22/2024    K 3.5 03/22/2024     03/22/2024    CO2 27.0 03/22/2024    OSMOCALC 287  03/22/2024      Lab Results   Component Value Date    WBC 10.0 03/22/2024    RBC 3.93 03/22/2024    HGB 10.4 (L) 03/22/2024    HCT 33.0 (L) 03/22/2024    MCV 84.0 03/22/2024    MCH 26.5 03/22/2024    MCHC 31.5 03/22/2024    RDW 13.6 03/22/2024    .0 03/22/2024      Lab Results   Component Value Date    TSH 2.760 09/27/2023        ASSESSMENT AND PLAN:       ICD-10-CM    1. Lower abdominal pain  R10.30 Urine Dip, auto without Micro     US BLADDER ONLY (CPT=76857)     CANCELED: US BLADDER ONLY (CPT=76857)   Incision is healing well and clean. PVR while inpatient was 400 mL. Given frequent low volume voids I would like to check a bladder US with PVR to definitively rule out retention.   2. Type 2 diabetes mellitus without complication, without long-term current use of insulin ([facility])  E11.9 Monofilament testing normal today. Continue current regimen for now. Last A1c 7.6%.      3. Pain with urination  R30.9 US BLADDER ONLY (CPT=76857)     CANCELED: US BLADDER ONLY (CPT=76857)      4. History of abdominal surgery  Z98.890 US BLADDER ONLY (CPT=76857)     CANCELED: US BLADDER ONLY (CPT=76857)   Repeat dipstick today without blood our evidence of infection. For 3-4, same plan as 1.   5. Otalgia of both ears  H92.03 REMOVAL IMPACTED CERUMEN USING IRRIGATION/LAVAGE, UNILATERAL      6. Impacted cerumen of left ear  H61.22 REMOVAL IMPACTED CERUMEN USING IRRIGATION/LAVAGE, UNILATERAL      7. Conductive hearing loss, bilateral  H90.0 REMOVAL IMPACTED CERUMEN USING IRRIGATION/LAVAGE, UNILATERAL      8. Dyspnea, unspecified type  R06.00 No associated chest pain. Does not occur with exertion. Per patient occurs when anxious and flustered and trying to get words out quickly. Lungs are CTAB. Heart RRR. Exam assuring. MA recorded saturation 92% which may be due to nail polish. I would like to see her back next week to discuss further and consider additional work up. May be anxiety driven.      9. Primary hypertension  I10 BP has  been fluctuating lately. Hold Triamterene-hydrochlorothiazide. If BP >120/80 can take Amlodipine 5 mg. Let me know ASAP if symptomatic.           The patient indicates understanding of these issues and agrees to the plan.  The patient is asked to return or present to the emergency room for worsening of symptoms.    TODAY'S ORDERS     No orders of the defined types were placed in this encounter.      Meds & Refills:  Requested Prescriptions      No prescriptions requested or ordered in this encounter       Imaging & Consults:  OP REFERRAL TO OPHTHALMOLOGY    No follow-ups on file.  There are no Patient Instructions on file for this visit.    All questions were answered and the patient agrees with the plan.     Thank you,  Mariaelena Valle, DO

## 2024-04-17 NOTE — TELEPHONE ENCOUNTER
Can Ms. Roche be scheduled for a 40 minute follow up with me, please? Would like to discuss her shortness of breath a bit more.

## 2024-04-17 NOTE — TELEPHONE ENCOUNTER
Future Appointments   Date Time Provider Department Center   5/1/2024 10:00 AM Mariaelena Valle,  [facility]

## 2024-04-18 ENCOUNTER — OFFICE VISIT (OUTPATIENT)
Dept: PHYSICAL THERAPY | Facility: HOSPITAL | Age: 59
End: 2024-04-18
Attending: PHYSICIAN ASSISTANT
Payer: MEDICAID

## 2024-04-18 PROCEDURE — 97110 THERAPEUTIC EXERCISES: CPT | Performed by: PHYSICAL THERAPY ASSISTANT

## 2024-04-18 NOTE — PROGRESS NOTES
Diagnosis:   Primary osteoarthritis of left knee (M17.12)         Referring Provider: Laurent Patterson  Date of Evaluation:    4/15/24    Precautions:  None Next MD visit:   none scheduled  Date of Surgery: 2021   Insurance Primary/Secondary: BLUE CROSS MEDICAID / N/A     # Auth Visits: 14            Subjective: It's not as bad today as when I came the other day - have been putting cabbage on my knee which seems to help. Doctor is talking about putting me in pain management. I am knock-kneed. Have an appt to go on 23rd to Dr Brown. Has pain in L knee and R knee post surgery 2021. Pt reports negligible walking - uses electric scooter at [employer]. States low adherence to any exercise program.     Pain: 7/10 - no pain killers      Objective: AROM: (* denotes performed with pain)  Hip Knee Foot/Ankle   Flexion: R nl; L nl  Extension: R nl; L nl  Abduction: R nl; L nl  ER: R nl; L nl  IR: R nl; L nl Flexion: R 92; L nl  Extension: R -9; L nl     DF: R nl; L nl  PF: R nl; L nl  INV: R nl; L nl  EV: R nl; L nl  Great toe ext: R nl; L nl      Strength/MMT: (* denotes performed with pain)  Hip Knee Foot/Ankle   Flexion: R 4/5; L 4/5  Extension: R 4/5; L 4/5  Abduction: R 4/5; L 4/5  ER: R 4/5; L 4/5  IR: R 4/5; L 4/5 Flexion: R 4/5; L 4/5  Extension: R 4/5; L 4/5    DF: R 5/5; L 5/5  PF: R 5/5; L 5/5  INV: R 5/5; L 5/5  EV: R 5/5; L 5/5  Great toe ext: R 5/5; L 5/5      Gait: pt ambulates on level ground with assistive device of SPC, antalgia, and decreased step length right le 2  Balance: SLS: R 5 sec, L 15 sec       Assessment: Pt presents with ongoing symptoms related to complaints of knee pain. She acknowledges that she is not consistent with her HEP.  During exercises she demonstrates reduced hip mobility and low proximal hip stability, requiring frequent VC and tactile cues to perform with good form. Motions are not performed with fluent muscle control and pt has difficulty isolating unilateral hip actions.    HEP reviewed today - pt states she has not been performing it as prescribed. Added exercises to POC to address deficits as identified at IE. Pt reported no adverse reaction in response to interventions today although expressed significant levels of fatigue. PT remains necessary to address ongoing deficits as identified at initial assessment.      Goals:   Goals: (to be met in 14 visits)  Pt will improve knee extension ROM to 0 deg to allow proper heel strike during gait and terminal knee extension in stance   Pt will improve knee AROM flexion to >92 degrees to improve ability to perform home , self care, transfers    Pt will improve quad strength to 5/5 to ascend 1 flight of stairs reciprocally without UE assist   Pt will increase hip and knee strength to grossly 4+/5 to be able to get up and down from the floor safely   Pt will demonstrate increased hip ER/ABD strength to 5/5 to perform stepping and squatting activities without excessive femoral IR/ADD   Pt will improve SLS to >5s to improve safety with gait on uneven surfaces such as grass and gravel  Pt will be independent and compliant with comprehensive HEP to maintain progress achieved in P    Plan:  Patient will be seen for 2 x/week or a total of 14 visits over a 90 day period. Treatment will include: Gait training, Manual Therapy, Neuromuscular Re-education, Self-Care Home Management, Therapeutic Activities, Therapeutic Exercise, and Home Exercise Program instruction   Date: 4/18/2024  TX#: 2/14 Date:                 TX#: 3/ Date:                 TX#: 4/ Date:                 TX#: 5/ Date:   Tx#: 6/   Ciarahells x 20 B   SL hip abduction x 20 B   Hooklying adductor squeeze x 20 w blue sports ball  Hooklying abduction w GTB x 20   Supine DKTC w RSB x 20   SKFO x 10 B   3 x 30 sec hamstring stretch w green strap B                                 HEP: Jaelyn  SL hip abduction    Charges: Ther ex x 3       Total Timed Treatment: 45 min  Total Treatment  Time: 45 min

## 2024-04-22 ENCOUNTER — OFFICE VISIT (OUTPATIENT)
Facility: LOCATION | Age: 59
End: 2024-04-22
Payer: MEDICAID

## 2024-04-22 VITALS — HEART RATE: 62 BPM | TEMPERATURE: 97 F

## 2024-04-22 DIAGNOSIS — K57.32 SIGMOID DIVERTICULITIS: Primary | ICD-10-CM

## 2024-04-22 NOTE — PROGRESS NOTES
Follow Up Visit Note       Active Problems      1. Sigmoid diverticulitis          Chief Complaint   Chief Complaint   Patient presents with    Post-Op     PO - ROBOTIC LOW ANTERIOR COLON RESECTION 3/20/24           History of Present Illness  This is a very nice 59-year-old female who returns for follow-up today after undergoing elective robotic low anterior colon resection for recurrent/holding diverticulitis 3/20/2024.    She is doing well overall and feels better overall compared to how she felt prior to surgery.  She did have some issues with postoperative constipation but this has resolved since taking daily MiraLAX.  She is now having 1-2 bowel movements daily.  She is eating well.  She denies any fevers, nausea or vomiting.  She is no longer having any abdominal pain.      Allergies  Sandra is allergic to seasonal.    Past Medical / Surgical / Social / Family History    The past medical and past surgical history have been reviewed by me today.    Past Medical History:    Diabetes (HCC)    Diverticulosis of large intestine    Esophageal reflux    Essential hypertension    High blood pressure    High cholesterol    Osteoarthritis    Pneumonia due to organism    Stroke (HCC)    2008    Visual impairment    READING GLASSES     Past Surgical History:   Procedure Laterality Date    Colonoscopy N/A 9/7/2023    Procedure: COLONOSCOPY;  Surgeon: Evelyn Coles MD;  Location:  ENDOSCOPY    Exploratory of abdomen      Knee replacement surgery         The family history and social history have been reviewed by me today.    Family History   Problem Relation Age of Onset    Heart Disorder Father     Cancer Mother     Hypertension Mother      Social History     Socioeconomic History    Marital status: Legally    Tobacco Use    Smoking status: Every Day     Current packs/day: 0.10     Types: Cigarettes    Smokeless tobacco: Never   Vaping Use    Vaping status: Never Used   Substance and Sexual Activity     Alcohol use: Yes     Comment: occasional    Drug use: Never        Current Outpatient Medications:     amLODIPine 5 MG Oral Tab, Take 1 tablet (5 mg total) by mouth daily., Disp: 30 tablet, Rfl: 0    atorvastatin 40 MG Oral Tab, Take 1 tablet (40 mg total) by mouth nightly., Disp: 90 tablet, Rfl: 0    HYDROcodone-acetaminophen (NORCO) 5-325 MG Oral Tab, Take 1 tablet by mouth every 6 (six) hours as needed for Pain. (Patient not taking: Reported on 4/17/2024), Disp: 15 tablet, Rfl: 0    ergocalciferol 1.25 MG (57747 UT) Oral Cap, Take 1 capsule (50,000 Units total) by mouth once a week., Disp: 6 capsule, Rfl: 0    zolpidem 10 MG Oral Tab, Take 1 tablet (10 mg total) by mouth nightly as needed for Sleep., Disp: 90 tablet, Rfl: 0    metFORMIN  MG Oral Tablet 24 Hr, Take 1 tablet (500 mg total) by mouth daily., Disp: 90 tablet, Rfl: 3    docusate sodium 100 MG Oral Cap, Take 1 capsule (100 mg total) by mouth 2 (two) times daily., Disp: 60 capsule, Rfl: 2    Triamterene-HCTZ 37.5-25 MG Oral Tab, Take 1 tablet by mouth daily., Disp: 90 tablet, Rfl: 0    albuterol 108 (90 Base) MCG/ACT Inhalation Aero Soln, Inhale 2 puffs into the lungs 4 (four) times daily as needed., Disp: 1 each, Rfl: 0     Review of Systems  A 10 point review of systems was performed and negative unless otherwise documented per HPI.     Physical Findings   Pulse 62   Temp 96.8 °F (36 °C)   Physical Exam  Vitals and nursing note reviewed. Exam conducted with a chaperone present.   Constitutional:       General: She is not in acute distress.  HENT:      Head: Normocephalic and atraumatic.      Mouth/Throat:      Mouth: Mucous membranes are moist.   Cardiovascular:      Rate and Rhythm: Normal rate and regular rhythm.   Pulmonary:      Effort: Pulmonary effort is normal.   Abdominal:      General: There is no distension.      Palpations: Abdomen is soft.      Tenderness: There is no abdominal tenderness.      Comments: Well-healed laparoscopic and  Pfannenstiel scars   Musculoskeletal:         General: No deformity.   Skin:     General: Skin is warm and dry.   Neurological:      General: No focal deficit present.      Mental Status: She is alert.   Psychiatric:         Mood and Affect: Mood normal.          Assessment   1. Sigmoid diverticulitis      This is a very nice 59-year-old female who returns for follow-up today after undergoing elective robotic low anterior colon resection for recurrent/holding diverticulitis 3/20/2024.    She is doing well overall and feels better overall compared to how she felt prior to surgery.  She did have some issues with postoperative constipation but this has resolved since taking daily MiraLAX.  She is now having 1-2 bowel movements daily.  She is eating well.  She denies any fevers, nausea or vomiting.  She is no longer having any abdominal pain.    Patient appears well on exam today.  Her incisions have healed nicely.  Pathology is benign and consistent with diverticular disease as expected.    Plan   Avoid to take anything heavier than 10 pounds for total of 6 weeks after surgery.  No bathing or dietary restrictions.  I encouraged her to continue to take daily MiraLAX for now.  She may wean off this medication in the next 1 to 2 months or continue to take it as needed if she has ongoing issues with constipation.  No further follow-up scheduled, but patient is welcome to see me at anytime in the future with any surgical questions or concerns.     No orders of the defined types were placed in this encounter.      Imaging & Referrals   None    Follow Up  No follow-ups on file.    Paulo Kearney MD

## 2024-04-23 ENCOUNTER — OFFICE VISIT (OUTPATIENT)
Dept: ORTHOPEDICS CLINIC | Facility: CLINIC | Age: 59
End: 2024-04-23
Payer: MEDICAID

## 2024-04-23 ENCOUNTER — HOSPITAL ENCOUNTER (OUTPATIENT)
Dept: GENERAL RADIOLOGY | Age: 59
Discharge: HOME | End: 2024-04-23
Attending: PODIATRIST
Payer: MEDICAID

## 2024-04-23 VITALS — HEIGHT: 66 IN | WEIGHT: 173 LBS | BODY MASS INDEX: 27.8 KG/M2

## 2024-04-23 DIAGNOSIS — M79.674 TOE PAIN, BILATERAL: Primary | ICD-10-CM

## 2024-04-23 DIAGNOSIS — M79.674 PAIN OF TOE OF RIGHT FOOT: ICD-10-CM

## 2024-04-23 DIAGNOSIS — M20.10 ACQUIRED HALLUX INTERPHALANGEUS, UNSPECIFIED LATERALITY: ICD-10-CM

## 2024-04-23 DIAGNOSIS — M21.41 PES PLANUS OF BOTH FEET: ICD-10-CM

## 2024-04-23 DIAGNOSIS — M21.42 PES PLANUS OF BOTH FEET: ICD-10-CM

## 2024-04-23 DIAGNOSIS — M79.675 TOE PAIN, BILATERAL: Primary | ICD-10-CM

## 2024-04-23 DIAGNOSIS — E11.9 TYPE 2 DIABETES MELLITUS WITHOUT COMPLICATION, WITHOUT LONG-TERM CURRENT USE OF INSULIN (HCC): ICD-10-CM

## 2024-04-23 PROCEDURE — 73660 X-RAY EXAM OF TOE(S): CPT | Performed by: PODIATRIST

## 2024-04-23 PROCEDURE — 99203 OFFICE O/P NEW LOW 30 MIN: CPT | Performed by: PODIATRIST

## 2024-04-23 NOTE — PROGRESS NOTES
[facility] New Patient Note    CC:   Chief Complaint   Patient presents with    Foot Pain     RT BIG TOE PAIN; NO INJURY DOI: A YEAR        HPI: The patient is a 59 year old female who presents today with complaints of pain about the toe of her right foot for about 1 year.  No injury and no known incident  She is not aware if it is related to her nail    Big toe pain for a year now        Past Medical History:    Diabetes (HCC)    Diverticulosis of large intestine    Esophageal reflux    Essential hypertension    High blood pressure    High cholesterol    Osteoarthritis    Pneumonia due to organism    Stroke (HCC)    2008    Visual impairment    READING GLASSES     Past Surgical History:   Procedure Laterality Date    Colonoscopy N/A 9/7/2023    Procedure: COLONOSCOPY;  Surgeon: Evelyn Coles MD;  Location:  ENDOSCOPY    Exploratory of abdomen      Knee replacement surgery       Current Outpatient Medications   Medication Sig Dispense Refill    amLODIPine 5 MG Oral Tab Take 1 tablet (5 mg total) by mouth daily. 30 tablet 0    atorvastatin 40 MG Oral Tab Take 1 tablet (40 mg total) by mouth nightly. 90 tablet 0    ergocalciferol 1.25 MG (27575 UT) Oral Cap Take 1 capsule (50,000 Units total) by mouth once a week. 6 capsule 0    zolpidem 10 MG Oral Tab Take 1 tablet (10 mg total) by mouth nightly as needed for Sleep. 90 tablet 0    metFORMIN  MG Oral Tablet 24 Hr Take 1 tablet (500 mg total) by mouth daily. 90 tablet 3    docusate sodium 100 MG Oral Cap Take 1 capsule (100 mg total) by mouth 2 (two) times daily. 60 capsule 2    Triamterene-HCTZ 37.5-25 MG Oral Tab Take 1 tablet by mouth daily. 90 tablet 0    albuterol 108 (90 Base) MCG/ACT Inhalation Aero Soln Inhale 2 puffs into the lungs 4 (four) times daily as needed. 1 each 0    HYDROcodone-acetaminophen (NORCO) 5-325 MG Oral Tab Take 1 tablet by mouth every 6 (six) hours as needed for Pain. (Patient not taking: Reported on 4/17/2024) 15  tablet 0     Allergies   Allergen Reactions    Seasonal OTHER (SEE COMMENTS)     Watery eyes, sneezing      Family History   Problem Relation Age of Onset    Heart Disorder Father     Cancer Mother     Hypertension Mother      Social History     Occupational History    Not on file   Tobacco Use    Smoking status: Every Day     Current packs/day: 0.10     Types: Cigarettes    Smokeless tobacco: Never   Vaping Use    Vaping status: Never Used   Substance and Sexual Activity    Alcohol use: Yes     Comment: occasional    Drug use: Never    Sexual activity: Not on file        ROS:  Complete ROS reviewed by me and non-contributory to the chief complaint except as mentioned above.    Physical Exam:    Ht 5' 6\" (1.676 m)   Wt 173 lb (78.5 kg)   BMI 27.92 kg/m²       Flexible flatfoot upon stance with wide midfoot and toes go into a little bit of valgus.  She also has hallux interphalangeus bilateral and crowding to the second toes.  Not much of a true bunion deformity bilateral.    Sensation is intact sharp versus dull.  She can feel light touch to the tips of the toes  Palpable pedal pulses.  Hair growth is present.  Skin is supple and feet are well perfused and warm.    Strength is 5 out of 5 all muscle groups    Full range of motion and nonpainful motion of the ankle joint, subtalar joint, MP joints, and midfoot joints.       Imaging: Mild arthritic changes at the IP joint of the right great toe.  Mild bunion deformity and osteopenic bone.  Mild hallux interphalangeus.  Personally viewed, independently interpreted and radiology report read.      Assessment/Diagnoses:  Diagnoses and all orders for this visit:    Toe pain, bilateral    Acquired hallux interphalangeus, unspecified laterality    Pes planus of both feet    Type 2 diabetes mellitus without complication, without long-term current use of insulin ([facility])        Plan:  I reviewed imaging and exam findings with the patient.  Her xrays are pretty negative.  We  discussed her foot type and the need for better shoegear.  Talked about dx of hallux interphalangeus and what it is.     Needs better shoes    Discussed arch supports    This affects her gait and also affects the positioning of both great toes and the reason they sort of go into valgus and irritate the distal aspect and the medial nail fold.  If these things are not helpful then she should see one of the other podiatrist who may consider doing an ingrown nail procedure.              Barbara Inman, [facility]  [facility]  [facility] [address], [address] [place] [id number]   [address] [place] [address]   [address] [place], [place] [address] [place] [email or web address]  [email or web address]  t: [phone]   f: [phone]              This document was partially prepared using Dragon Medical voice recognition software.

## 2024-04-23 NOTE — PROGRESS NOTES
Post Operative Visit Note       Active Problems  1. Post-operative state         Chief Complaint   Chief Complaint   Patient presents with    Post-Op     PO 3/20 ROBOTIC LOW ANTERIOR COLON RESECTION W/SUGRUE- pt reports no BMs for the last 3 days, reports RLQ pain, denies fevers or chills           History of Present Illness   59 year old female presents today for postoperative visit following robotic low anterior resection on 3/20/2024.    Patient states that she is overall doing well.  She reports mild constipation.  She states she has not had a bowel movement 3 days.  She reports mild incisional pain.  She denies nausea or vomiting.  She denies fever or chills.  She is tolerating diet.      Allergies  Sandra is allergic to seasonal.    Past Medical / Surgical / Social / Family History    The past medical and past surgical history have been reviewed by me today.     Past Medical History:    Diabetes (HCC)    Diverticulosis of large intestine    Esophageal reflux    Essential hypertension    High blood pressure    High cholesterol    Osteoarthritis    Pneumonia due to organism    Stroke (HCC)    2008    Visual impairment    READING GLASSES     Past Surgical History:   Procedure Laterality Date    Colonoscopy N/A 9/7/2023    Procedure: COLONOSCOPY;  Surgeon: Evelyn Coles MD;  Location:  ENDOSCOPY    Exploratory of abdomen      Knee replacement surgery         The family history and social history have been reviewed by me today.    Family History   Problem Relation Age of Onset    Heart Disorder Father     Cancer Mother     Hypertension Mother      Social History     Socioeconomic History    Marital status: Legally    Tobacco Use    Smoking status: Every Day     Current packs/day: 0.10     Types: Cigarettes    Smokeless tobacco: Never   Vaping Use    Vaping status: Never Used   Substance and Sexual Activity    Alcohol use: Yes     Comment: occasional    Drug use: Never        Current Outpatient  Medications:     amLODIPine 5 MG Oral Tab, Take 1 tablet (5 mg total) by mouth daily., Disp: 30 tablet, Rfl: 0    atorvastatin 40 MG Oral Tab, Take 1 tablet (40 mg total) by mouth nightly., Disp: 90 tablet, Rfl: 0    HYDROcodone-acetaminophen (NORCO) 5-325 MG Oral Tab, Take 1 tablet by mouth every 6 (six) hours as needed for Pain. (Patient not taking: Reported on 4/17/2024), Disp: 15 tablet, Rfl: 0    ergocalciferol 1.25 MG (79978 UT) Oral Cap, Take 1 capsule (50,000 Units total) by mouth once a week., Disp: 6 capsule, Rfl: 0    zolpidem 10 MG Oral Tab, Take 1 tablet (10 mg total) by mouth nightly as needed for Sleep., Disp: 90 tablet, Rfl: 0    metFORMIN  MG Oral Tablet 24 Hr, Take 1 tablet (500 mg total) by mouth daily., Disp: 90 tablet, Rfl: 3    docusate sodium 100 MG Oral Cap, Take 1 capsule (100 mg total) by mouth 2 (two) times daily., Disp: 60 capsule, Rfl: 2    Triamterene-HCTZ 37.5-25 MG Oral Tab, Take 1 tablet by mouth daily., Disp: 90 tablet, Rfl: 0    albuterol 108 (90 Base) MCG/ACT Inhalation Aero Soln, Inhale 2 puffs into the lungs 4 (four) times daily as needed., Disp: 1 each, Rfl: 0      Review of Systems  A 10 point Review of Systems has been completed by me today and is negative except as above in the HPI.    Physical Findings   Temp 97.3 °F (36.3 °C) (Temporal)   Gen/psych: alert and oriented, cooperative, no apparent distress  Cardiovascular: regular rate  Respiratory: respirations unlabored, no wheeze  Abdominal: soft, non-tender, non-distended, no guarding/rebound  Incisions:  Incisions have healed well.  There is no redness or drainage noted.       Assessment/Plan  1. Post-operative state        Patient may slowly start returning to high-fiber diet.  She may add MiraLAX daily as needed to avoid constipation.  She is to continue with lifting restrictions of no more than 20 pounds for 6 weeks from the date of her surgery.  All questions and concerns were answered.  She is return to the  clinic in 2 weeks for follow-up with Dr. Kearney, sooner if needed     No orders of the defined types were placed in this encounter.       Imaging & Referrals   None      Diana Zhao PA-C  [facility]  4/23/2024  3:08 PM

## 2024-04-25 ENCOUNTER — OFFICE VISIT (OUTPATIENT)
Dept: PHYSICAL THERAPY | Facility: HOSPITAL | Age: 59
End: 2024-04-25
Attending: PHYSICIAN ASSISTANT
Payer: MEDICAID

## 2024-04-25 ENCOUNTER — TELEPHONE (OUTPATIENT)
Dept: PHYSICAL THERAPY | Facility: HOSPITAL | Age: 59
End: 2024-04-25

## 2024-04-25 PROCEDURE — 97110 THERAPEUTIC EXERCISES: CPT | Performed by: PHYSICAL THERAPIST

## 2024-04-25 PROCEDURE — 97140 MANUAL THERAPY 1/> REGIONS: CPT | Performed by: PHYSICAL THERAPIST

## 2024-04-25 NOTE — PROGRESS NOTES
Diagnosis:   Primary osteoarthritis of left knee (M17.12)         Referring Provider: Laurent Patterson  Date of Evaluation:    4/15/24    Precautions:  None Next MD visit:   none scheduled  Date of Surgery: 2021   Insurance Primary/Secondary: BLUE CROSS MEDICAID / N/A     # Auth Visits: 14            Subjective: Continues to have pain in the knee, and continues to note swelling.     Pain: 7/10 - no pain killers      Objective: AROM: (* denotes performed with pain)  Hip Knee Foot/Ankle   Flexion: R nl; L nl  Extension: R nl; L nl  Abduction: R nl; L nl  ER: R nl; L nl  IR: R nl; L nl Flexion: R 92; L nl  Extension: R -9; L nl     DF: R nl; L nl  PF: R nl; L nl  INV: R nl; L nl  EV: R nl; L nl  Great toe ext: R nl; L nl      Strength/MMT: (* denotes performed with pain)  Hip Knee Foot/Ankle   Flexion: R 4/5; L 4/5  Extension: R 4/5; L 4/5  Abduction: R 4/5; L 4/5  ER: R 4/5; L 4/5  IR: R 4/5; L 4/5 Flexion: R 4/5; L 4/5  Extension: R 4/5; L 4/5    DF: R 5/5; L 5/5  PF: R 5/5; L 5/5  INV: R 5/5; L 5/5  EV: R 5/5; L 5/5  Great toe ext: R 5/5; L 5/5      Gait: amb without AD today .  Foot flat on right le.    Balance: SLS: R 5 sec, L 15 sec    = extensor lag with SLR   No heel strike with gait      Assessment: gait improvement with verbal cues to try heel strike with gait. Improved knee extension with joint mobs and emphasis is on both flexion and extension of the right knee. Pt reported no adverse reaction in response to interventions today although expressed significant levels of fatigue. PT remains necessary to address ongoing deficits as identified at initial assessment.      Goals:   Goals: (to be met in 14 visits)  Pt will improve knee extension ROM to 0 deg to allow proper heel strike during gait and terminal knee extension in stance   Pt will improve knee AROM flexion to >92 degrees to improve ability to perform home , self care, transfers    Pt will improve quad strength to 5/5 to ascend 1 flight of stairs  reciprocally without UE assist   Pt will increase hip and knee strength to grossly 4+/5 to be able to get up and down from the floor safely   Pt will demonstrate increased hip ER/ABD strength to 5/5 to perform stepping and squatting activities without excessive femoral IR/ADD   Pt will improve SLS to >5s to improve safety with gait on uneven surfaces such as grass and gravel  Pt will be independent and compliant with comprehensive HEP to maintain progress achieved in P    Plan:  Patient will be seen for 2 x/week or a total of 14 visits over a 90 day period. Treatment will include: Gait training, Manual Therapy, Neuromuscular Re-education, Self-Care Home Management, Therapeutic Activities, Therapeutic Exercise, and Home Exercise Program instruction   Date: 4/18/2024  TX#: 2/14 Date:     4/25/24            TX#: 3/ Date:                 TX#: 4/ Date:                 TX#: 5/ Date:   Tx#: 6/   Clamshells x 20 B   SL hip abduction x 20 B   Hooklying adductor squeeze x 20 w blue sports ball  Hooklying abduction w GTB x 20   Supine DKTC w RSB x 20   SKFO x 10 B   3 x 30 sec hamstring stretch w green strap B      NuStep x 6    SAQ x30 1.5 #   SLR x15   QS x20   Hooklying abduction w GTB x 20   TKE's x20 grn tb in // bars   Gastroc slant board x3 @ 30 sec each   Shuttle x20 31# squats   Sides step red TB x 2 laps 10 steps              STM to PF join x3'   Tib fib mobs to increase knee flex x3'   Tib fib mobs to increase extension                       HEP: Jaelyn  SL hip abduction    Charges: Ther ex x 2     MT 1 Total Timed Treatment: 45 min  Total Treatment Time: 45 min

## 2024-04-26 ENCOUNTER — OFFICE VISIT (OUTPATIENT)
Dept: PAIN CLINIC | Facility: CLINIC | Age: 59
End: 2024-04-26
Payer: MEDICAID

## 2024-04-26 VITALS
WEIGHT: 173 LBS | OXYGEN SATURATION: 97 % | SYSTOLIC BLOOD PRESSURE: 118 MMHG | BODY MASS INDEX: 28 KG/M2 | DIASTOLIC BLOOD PRESSURE: 64 MMHG | HEART RATE: 58 BPM

## 2024-04-26 DIAGNOSIS — T84.84XS PAIN IN KNEE REGION AFTER TOTAL KNEE REPLACEMENT, SEQUELA: ICD-10-CM

## 2024-04-26 DIAGNOSIS — Z96.651 STATUS POST RIGHT KNEE REPLACEMENT: Primary | ICD-10-CM

## 2024-04-26 DIAGNOSIS — Z96.659 PAIN IN KNEE REGION AFTER TOTAL KNEE REPLACEMENT, SEQUELA: ICD-10-CM

## 2024-04-26 PROBLEM — T84.84XA PAIN IN KNEE REGION AFTER TOTAL KNEE REPLACEMENT: Status: ACTIVE | Noted: 2024-04-26

## 2024-04-26 PROBLEM — T84.84XA PAIN IN KNEE REGION AFTER TOTAL KNEE REPLACEMENT (HCC): Status: ACTIVE | Noted: 2024-04-26

## 2024-04-26 PROCEDURE — 99204 OFFICE O/P NEW MOD 45 MIN: CPT | Performed by: ANESTHESIOLOGY

## 2024-04-26 PROCEDURE — G8510 SCR DEP NEG, NO PLAN REQD: HCPCS | Performed by: ANESTHESIOLOGY

## 2024-04-26 NOTE — H&P
Name: Sandra Roche   : 1965   DOS: 2024     Chief complaint: Knee pain after total knee replacement    History of present illness:  Sandra Roche is a 59 year old female with a history of diabetes, hypertension, high cholesterol who presents today for evaluation of chronic knee pain.  The patient reports history of right knee pain.  This first began in  after total knee replacement.  There was revision knee replacement .  The patient complains of pain globally through the anterior lateral knee.  Rates the pain as 8 out of 10.  Has completed physical therapy and been treated with multiple medications without improvement.      She denies any chills, fever or weakness. She denies any bladder or bowel incontinence.      Past Medical History:    Diabetes ([facility])    Diverticulosis of large intestine    Esophageal reflux    Essential hypertension    High blood pressure    High cholesterol    Osteoarthritis    Pneumonia due to organism    Stroke ([facility])        Visual impairment    READING GLASSES      Current Outpatient Medications   Medication Sig Dispense Refill    amLODIPine 5 MG Oral Tab Take 1 tablet (5 mg total) by mouth daily. 30 tablet 0    atorvastatin 40 MG Oral Tab Take 1 tablet (40 mg total) by mouth nightly. 90 tablet 0    HYDROcodone-acetaminophen (NORCO) 5-325 MG Oral Tab Take 1 tablet by mouth every 6 (six) hours as needed for Pain. 15 tablet 0    ergocalciferol 1.25 MG (60997 UT) Oral Cap Take 1 capsule (50,000 Units total) by mouth once a week. 6 capsule 0    zolpidem 10 MG Oral Tab Take 1 tablet (10 mg total) by mouth nightly as needed for Sleep. 90 tablet 0    metFORMIN  MG Oral Tablet 24 Hr Take 1 tablet (500 mg total) by mouth daily. 90 tablet 3    docusate sodium 100 MG Oral Cap Take 1 capsule (100 mg total) by mouth 2 (two) times daily. 60 capsule 2    albuterol 108 (90 Base) MCG/ACT Inhalation Aero Soln Inhale 2 puffs into the lungs 4 (four) times daily as needed. 1 each  0    Triamterene-HCTZ 37.5-25 MG Oral Tab Take 1 tablet by mouth daily. (Patient not taking: Reported on 4/26/2024) 90 tablet 0     Past Surgical History:   Procedure Laterality Date    Colonoscopy N/A 9/7/2023    Procedure: COLONOSCOPY;  Surgeon: Evelyn Coles MD;  Location:  ENDOSCOPY    Exploratory of abdomen      Knee replacement surgery        Family History   Problem Relation Age of Onset    Heart Disorder Father     Cancer Mother     Hypertension Mother      Social History     Socioeconomic History    Marital status: Legally    Tobacco Use    Smoking status: Every Day     Current packs/day: 0.10     Types: Cigarettes    Smokeless tobacco: Never   Vaping Use    Vaping status: Never Used   Substance and Sexual Activity    Alcohol use: Yes     Comment: occasional    Drug use: Never     Social Determinants of Health     Financial Resource Strain: Low Risk  (3/25/2024)    Financial Resource Strain     Difficulty of Paying Living Expenses: Not very hard     Med Affordability: No   Food Insecurity: No Food Insecurity (3/20/2024)    Food Insecurity     Food Insecurity: Never true   Transportation Needs: No Transportation Needs (3/25/2024)    Transportation Needs     Lack of Transportation: No   Housing Stability: Low Risk  (3/20/2024)    Housing Stability     Housing Instability: No       Review of  other systems:  Point ROS otherwise negative    Physical examination: Sandra is a 59 year old female not in acute distress  /64   Pulse 58   Wt 173 lb (78.5 kg)   SpO2 97%   BMI 27.92 kg/m²    The patient is awake, alert, oriented and corporative. She has a normal affect. The patient ambulates with normal gait.  HEENT: No gross lesion noted. PEERL. No icterus.  Neck and Upper Extremity: Supple. No thyromegaly or lymphadenopathy.   Motor Examination:    (R)   (L)  Deltoid:      5    5  Biceps:       5    5  Triceps:      5    5  Wrist Extension:     5    5  Wrist Flexsion:     5    5  Finger  Extension:     5    5  Finger Flexsion:     5    5       Cardiovascular system: Regular rate and rhythm.   Respiratory system: Breath sounds equal bilaterally.    Abdomen: Soft, nontender, .  Neurologic:  Cranial nerves II through XII are grossly intact.       Examination of the lower back:    Motor Examination:   (R)   (L)   Hip Abduction:   5    5   Hip Flexion:    5    5   Knee Extension:   5    5   Knee Flexion:    5    5   Ant. Tibialis:    5    5  Extensor Hallucis Longus:   5    5  Peroneals:     5    5  Gastrocsoleus:     5    5    Radiology diagnostic studies:   Knee x-ray reviewed independently.  There is evidence of hinged total knee arthroplasty without evidence of loosening    Assessment:  Encounter Diagnoses   Name Primary?    Status post right knee replacement Yes    Pain in knee region after total knee replacement, sequela    .      Plan:     The patient is a 59-year-old female who presents today for evaluation of chronic knee pain.  Patient has a history of primary right total knee replacement in 2020.  There was difficulty with extension and revision knee replacement was done in 2021.  The patient reports chronic knee pain in the medial and anterior and lateral aspect of the right knee despite physical therapy and conservative measures.  Has failed multiple medications.  Had detailed discussion with patient regarding chronic pain after total joint replacement.  Recommended trial of genicular nerve block.  Depending upon response may be a candidate for radiofrequency ablation for more extended benefit.      Bipin Brown MD MPH  Pain Management

## 2024-04-26 NOTE — PATIENT INSTRUCTIONS
Refill policies:    Allow 2-3 business days for refills; controlled substances may take longer.  Contact your pharmacy at least 5 days prior to running out of medication and have them send an electronic request or submit request through the “request refill” option in your [employer] account.  Refills are not addressed on weekends; covering physicians do not authorize routine medications on weekends.  No narcotics or controlled substances are refilled after noon on Fridays or by on call physicians.  By law, narcotics must be electronically prescribed.  A 30 day supply with no refills is the maximum allowed.  If your prescription is due for a refill, you may be due for a follow up appointment.  To best provide you care, patients receiving routine medications need to be seen at least once a year.  Patients receiving narcotic/controlled substance medications need to be seen at least once every 3 months.  In the event that your preferred pharmacy does not have the requested medication in stock (e.g. Backordered), it is your responsibility to find another pharmacy that has the requested medication available.  We will gladly send a new prescription to that pharmacy at your request.    Scheduling Tests:    If your physician has ordered radiology tests such as MRI or CT scans, please contact Central Scheduling at [phone] right away to schedule the test.  Once scheduled, the [facility] Centralized Referral Team will work with your insurance carrier to obtain pre-certification or prior authorization.  Depending on your insurance carrier, approval may take 3-10 days.  It is highly recommended patients assure they have received an authorization before having a test performed.  If test is done without insurance authorization, patient may be responsible for the entire amount billed.      Precertification and Prior Authorizations:  If your physician has recommended that you have a procedure or additional testing performed the [facility]  Centralized Referral Team will contact your insurance carrier to obtain pre-certification or prior authorization.    You are strongly encouraged to contact your insurance carrier to verify that your procedure/test has been approved and is a COVERED benefit.  Although the [facility] Centralized Referral Team does its due diligence, the insurance carrier gives the disclaimer that \"Although the procedure is authorized, this does not guarantee payment.\"    Ultimately the patient is responsible for payment.   Thank you for your understanding in this matter.  Paperwork Completion:  If you require FMLA or disability paperwork for your recovery, please make sure to either drop it off or have it faxed to our office at [phone]. Be sure the form has your name and date of birth on it.  The form will be faxed to our Forms Department and they will complete it for you.  There is a 25$ fee for all forms that need to be filled out.  Please be aware there is a 10-14 day turnaround time.  You will need to sign a release of information (LEANN) form if your paperwork does not come with one.  You may call the Forms Department with any questions at [phone].  Their fax number is [phone].

## 2024-04-26 NOTE — PROGRESS NOTES
Subjective:   Patient ID: Sandra Roche is a 59 year old female.    HPI    History/Other:   Review of Systems  Current Outpatient Medications   Medication Sig Dispense Refill   • amLODIPine 5 MG Oral Tab Take 1 tablet (5 mg total) by mouth daily. 30 tablet 0   • atorvastatin 40 MG Oral Tab Take 1 tablet (40 mg total) by mouth nightly. 90 tablet 0   • ergocalciferol 1.25 MG (11212 UT) Oral Cap Take 1 capsule (50,000 Units total) by mouth once a week. 6 capsule 0   • zolpidem 10 MG Oral Tab Take 1 tablet (10 mg total) by mouth nightly as needed for Sleep. 90 tablet 0   • metFORMIN  MG Oral Tablet 24 Hr Take 1 tablet (500 mg total) by mouth daily. 90 tablet 3   • docusate sodium 100 MG Oral Cap Take 1 capsule (100 mg total) by mouth 2 (two) times daily. 60 capsule 2   • Triamterene-HCTZ 37.5-25 MG Oral Tab Take 1 tablet by mouth daily. 90 tablet 0   • albuterol 108 (90 Base) MCG/ACT Inhalation Aero Soln Inhale 2 puffs into the lungs 4 (four) times daily as needed. 1 each 0   • HYDROcodone-acetaminophen (NORCO) 5-325 MG Oral Tab Take 1 tablet by mouth every 6 (six) hours as needed for Pain. (Patient not taking: Reported on 4/17/2024) 15 tablet 0     Allergies:  Allergies   Allergen Reactions   • Seasonal OTHER (SEE COMMENTS)     Watery eyes, sneezing        Objective:   Physical Exam  Constitutional:          Assessment & Plan:   No diagnosis found.    No orders of the defined types were placed in this encounter.      Meds This Visit:  Requested Prescriptions      No prescriptions requested or ordered in this encounter       Imaging & Referrals:  None    Location of Pain: right knee    Date Pain Began: 1.5 yrs          Work Related:   No        Receiving Work Comp/Disability:   Yes    Numeric Rating Scale:  Pain at Present:  10                                                                                                            (No Pain) 0  to  10 (Worst Pain)                 Minimum Pain:   9  Maximum  Pain  9    Distribution of Pain:    right    Quality of Pain:   aching    Origin of Pain:    Surgical complications    Aggravating Factors:    Walking    Past Treatments for Current Pain Condition:   Physical Therapy and Surgery    Prior diagnostic testing for your pain:  xray 2023

## 2024-04-29 ENCOUNTER — APPOINTMENT (OUTPATIENT)
Dept: PHYSICAL THERAPY | Facility: HOSPITAL | Age: 59
End: 2024-04-29
Attending: INTERNAL MEDICINE
Payer: MEDICAID

## 2024-04-29 ENCOUNTER — TELEPHONE (OUTPATIENT)
Dept: PAIN CLINIC | Facility: CLINIC | Age: 59
End: 2024-04-29

## 2024-04-29 DIAGNOSIS — M17.11 PRIMARY OSTEOARTHRITIS OF RIGHT KNEE: Primary | ICD-10-CM

## 2024-04-29 NOTE — TELEPHONE ENCOUNTER
Prior authorization request completed for: right genicular nerve block  Authorization # no auth needed   Pre-D: no   Exclusions/Restrictions: no  Covered Benefit: yes  Authorization dates: n/a  CPT codes approved: 90405  Number of visits/dates of service approved: 1  Physician: brennen  Location: [facility]   Call Ref#: [id number]  Representative Name: [facility]   Insurance Carrier: [phone]    Patient can be scheduled. Routed to Navigator.

## 2024-04-30 NOTE — TELEPHONE ENCOUNTER
Call transferred from PSR - patient returning call to schedule.       Patient advised of insurance approval to proceed with injections and is agreeable to scheduling. Patient scheduled for procedure, pre-procedure instructions reviewed. Patient prefers conscious  sedation. Reviewed sedation instructions including Fast 8 hours prior and  Required. Patient has no medications to hold prior to procedure. Advised patient that she can take blood pressure medication w/small sip of water. Patient is on Metformin for diabetes - instructed patient to monitor glucose levels since she will be fasting and adjust medication if needed. Patient verbalized understanding of instructions, no further needs at this time.    Patient asked if she can go to P/T same day of injection? Advised patient that day of injection it is recommended patient's to rest the remaninig part of day, P/T is not recommended same day as injection, the following day can resume normal activity no restrictions. Patient [facility]  PRE-PROCEDURE INSTRUCTIONS WITH IV SEDATION     Procedure: Right Genicular Nerve Block     Appointment Date: 05/14/2024   Check-In Time: 10:30 AM     Follow-Up Date/Time w/ : 05/29/2024 @ 09:45 AM    Prior to the procedure:  Please update us prior to the procedure if you are experiencing any symptoms of infection such as cough, fever, chills, urinary symptoms, or have recently been prescribed antibiotics, have open wounds, have recently had surgery or dental procedures.    Day of Procedure:  Do not eat or drink anything (including water) 8 hours prior to your procedure.  If you take morning blood pressure medication or oral diabetic medication, please take with a small sip of water.  You are required to have a responsible adult drive you home after your procedure. You may not take a cab or ride share unless you have a responsible adult with you in the cab or ride share.  A family member or friend  is required to stay in our waiting room or hospital garage because of the sedation you will receive, you may be sleepy and forgetful. You may not remember anything told to you after your procedure including discharge instructions. Please note: children are not allowed in the holding area so please make appropriate arrangements.  Any additional family members and friends will need to remain in the surgical waiting room or hospital garage for the duration of your procedure. *If your family member or friend elects to wait in the garage, they must leave a cell phone number with the lab staff in case they need to be reached.  Please park in the [facility] garage and follow the signs to the [facility].  Please bring your Insurance Card, Photo ID, List of Current Medications and Referral (if applicable) to your appointment. Check in at [address]) outpatient registration in the [facility].  Please note-No prescriptions will be written by Pain Clinic in OR on the day of procedure. If you require a refill of medications, please contact the office 48 hours prior to your procedure.  If you have an implanted Spinal Cord or Peripheral Nerve Stimulator: Please remember to turn device off for procedure    *If you are fasting, you may take blood pressure and thyroid medications with a small sip of water the day of your procedure.   *If you are diabetic, your glucose must be within a normal range for you. If you are fasting, you should check your glucose levels and adjust with medication if needed.    Medication Hold:  Number of days you need to be off for the following medications:    Aggrenox 10 days   Agrylin (Anagrelide) 10 days  Brilinta (Ticagrelor) 7 days  Imbruvica (Ibrutinib) 3 days   Enbrel (Etanercept) 24 hours   Fragmin (Dalteparin) 24 hours   Pletal (Cilostazol) 7 days  Effient (Prasugrel) 7 days  Pradaxa 10 days  Trental 7 days  Eliquis (Apixaban) 3 days  Xarelto  (Rivaroxaban) 3 days  Lovenox (Enoxaparin) 24 hours  Aspirin  Greater than 81mg but less than 325mg   5 days  325mg and greater                  7 days  (*81 mg      24 hours preferred, but not required)  Coumadin       5 days  Procedure may be cancelled if INR is elevated.   Excedrin (with aspirin) 7 days  Plavix (Clopidogrel)                             7 days    NSAIDs: 24 hours preferred, but not required      Ibuprofen (Motrin, Advil, Vicoprofen), Naproxen (Naprosyn, Aleve), Piroxcam (Feldene), Meloxicam (Mobic), Oxaprozin (Daypro), Diclofenac (Voltaren), Indomethacin (Indocin), Etodolac (Lodine), Nabumetone (Relafen), Celebrex (Celecoxib)           HERBAL SUPPLEMENTS  5 days preferred, but not required  Fish oil, krill oil, Omega-3, Vascepa, Vitamin E, Turmeric, Garlic                       Insurance Authorization:   Most insurances are now requiring a preauthorization for all procedures.     Please contact your insurance carrier to determine what your financial responsibility will be for the procedure(s).    Cancellation/Rescheduling Appointment:   In the event you need to cancel or reschedule your appointment, you must notify the office 24 hours prior.    Post-procedure instructions:        Please schedule a follow up visit within 2 to 4 weeks after your last procedure date   Please call our office with any questions or concerns before or after your procedure at [phone], #2.  If you are a diabetic, please increase the frequency of your glucose monitoring after the procedure as this may cause a temporary increase in your blood sugar. Contact your primary care physician if your blood sugar rises as you may require some medication adjustment.        It is normal to have increased pain at injection site for up to 3-5 days after procedure, you can        use heat or ice (20 minutes on 20 minutes off) for comfort.

## 2024-05-01 ENCOUNTER — OFFICE VISIT (OUTPATIENT)
Dept: INTERNAL MEDICINE CLINIC | Facility: CLINIC | Age: 59
End: 2024-05-01
Payer: MEDICAID

## 2024-05-01 VITALS
SYSTOLIC BLOOD PRESSURE: 120 MMHG | RESPIRATION RATE: 18 BRPM | OXYGEN SATURATION: 100 % | BODY MASS INDEX: 28.28 KG/M2 | DIASTOLIC BLOOD PRESSURE: 76 MMHG | WEIGHT: 176 LBS | TEMPERATURE: 99 F | HEIGHT: 66 IN | HEART RATE: 78 BPM

## 2024-05-01 DIAGNOSIS — E78.00 HYPERCHOLESTEREMIA: ICD-10-CM

## 2024-05-01 DIAGNOSIS — R06.02 SHORTNESS OF BREATH: Primary | ICD-10-CM

## 2024-05-01 DIAGNOSIS — I10 PRIMARY HYPERTENSION: ICD-10-CM

## 2024-05-01 DIAGNOSIS — R07.9 RIGHT-SIDED CHEST PAIN: ICD-10-CM

## 2024-05-01 DIAGNOSIS — E11.65 TYPE 2 DIABETES MELLITUS WITH HYPERGLYCEMIA, WITHOUT LONG-TERM CURRENT USE OF INSULIN (HCC): ICD-10-CM

## 2024-05-01 LAB
ATRIAL RATE: 57 BPM
P AXIS: -9 DEGREES
P-R INTERVAL: 144 MS
Q-T INTERVAL: 438 MS
QRS DURATION: 84 MS
QTC CALCULATION (BEZET): 426 MS
R AXIS: 12 DEGREES
T AXIS: 36 DEGREES
VENTRICULAR RATE: 57 BPM

## 2024-05-01 PROCEDURE — 99214 OFFICE O/P EST MOD 30 MIN: CPT | Performed by: INTERNAL MEDICINE

## 2024-05-01 PROCEDURE — 93000 ELECTROCARDIOGRAM COMPLETE: CPT | Performed by: INTERNAL MEDICINE

## 2024-05-01 NOTE — PROGRESS NOTES
Sandra Roche  2/1/1965    Chief Complaint   Patient presents with    Breathing Problem     TA RM14     SUBJECTIVE     Intermittent right sided chest pain described as a cramp x 2 weeks. Does not correlate with exertion. The first time it happened she was seated. Last for a few seconds.     She sometimes feels short of breath when she walks up the stairs but not always. Her bedroom is on the second floor so she is ambulating on the stairs many times per day.  Also feels short of breath when flustered and talking quickly.     Review of Systems   Review of Systems   No f/c/chest pain or sob. No cough. No abd pain/n/v/d. No ha or dizziness. No numbness, tingling, or weakness. No other complaints today.    OBJECTIVE:   /76   Pulse 78   Temp 98.7 °F (37.1 °C)   Resp 18   Ht 5' 6\" (1.676 m)   Wt 176 lb (79.8 kg)   SpO2 100%   BMI 28.41 kg/m²   Physical Exam   Constitutional: Oriented to person, place, and time. No distress.   HEENT:  Normocephalic and atraumatic.TM's wnl.  Nose normal. Oropharynx is clear and moist.   Eyes: Conjunctivae wnl.   Neck: Normal range of motion. Neck supple. No carotid bruits.  Cardiovascular: Normal rate, regular rhythm and intact distal pulses.  No murmur, rubs or gallops.   Pulmonary/Chest: Effort normal and breath sounds normal. No respiratory distress.    Lab Results   Component Value Date     (H) 03/22/2024    BUN 7 (L) 03/22/2024    CREATSERUM 0.73 03/22/2024    ANIONGAP 0 03/22/2024    CA 9.1 03/22/2024     03/22/2024    K 3.5 03/22/2024     03/22/2024    CO2 27.0 03/22/2024    OSMOCALC 287 03/22/2024      Lab Results   Component Value Date    WBC 10.0 03/22/2024    RBC 3.93 03/22/2024    HGB 10.4 (L) 03/22/2024    HCT 33.0 (L) 03/22/2024    MCV 84.0 03/22/2024    MCH 26.5 03/22/2024    MCHC 31.5 03/22/2024    RDW 13.6 03/22/2024    .0 03/22/2024      Lab Results   Component Value Date    TSH 2.760 09/27/2023        ASSESSMENT AND PLAN:        ICD-10-CM    1. Shortness of breath  R06.02 CARD ECHO 2D DOPPLER (CPT=93306)     XR CHEST PA + LAT CHEST (CPT=71046)      2. Right-sided chest pain  R07.9 EKG with interpretation and Report -IN OFFICE [76908]      3. Type 2 diabetes mellitus with hyperglycemia, without long-term current use of insulin (HCC)  E11.65 Last A1c 6.5% Will continue current dose Metformin  mg for now. Repeat A1c in 3 mo from prior and if heading in the wrong direction will increase dose.      4. Primary hypertension  I10 Well controlled in office today. Continue current regimen.      5. Hypercholesteremia  E78.00 Last lipid panel looks great. Continue Atorvastatin 40 mg.      For 1-2 the patient has several cardiac risk factors including hypertension, diabetes, prior CVA. Stress test was actually ordered in Oct but never done. She was instructed to schedule this as well as ECHO ASAP. Can do CXR today in lab. Otherwise vitals are assuring.      The patient indicates understanding of these issues and agrees to the plan.  The patient is asked to return or present to the emergency room for worsening of symptoms.    TODAY'S ORDERS     No orders of the defined types were placed in this encounter.      Meds & Refills:  Requested Prescriptions      No prescriptions requested or ordered in this encounter       Imaging & Consults:  None    No follow-ups on file.  There are no Patient Instructions on file for this visit.    All questions were answered and the patient agrees with the plan.     Thank you,  Mariaelena Valle, DO

## 2024-05-03 ENCOUNTER — HOSPITAL ENCOUNTER (OUTPATIENT)
Dept: GENERAL RADIOLOGY | Age: 59
Discharge: HOME | End: 2024-05-03
Attending: INTERNAL MEDICINE
Payer: MEDICAID

## 2024-05-03 ENCOUNTER — OFFICE VISIT (OUTPATIENT)
Dept: PHYSICAL THERAPY | Facility: HOSPITAL | Age: 59
End: 2024-05-03
Attending: PHYSICIAN ASSISTANT
Payer: MEDICAID

## 2024-05-03 ENCOUNTER — TELEPHONE (OUTPATIENT)
Dept: INTERNAL MEDICINE CLINIC | Facility: CLINIC | Age: 59
End: 2024-05-03

## 2024-05-03 DIAGNOSIS — R06.02 SHORTNESS OF BREATH: ICD-10-CM

## 2024-05-03 PROCEDURE — 97110 THERAPEUTIC EXERCISES: CPT | Performed by: PHYSICAL THERAPIST

## 2024-05-03 PROCEDURE — 97140 MANUAL THERAPY 1/> REGIONS: CPT | Performed by: PHYSICAL THERAPIST

## 2024-05-03 PROCEDURE — 71046 X-RAY EXAM CHEST 2 VIEWS: CPT | Performed by: INTERNAL MEDICINE

## 2024-05-03 NOTE — TELEPHONE ENCOUNTER
Started after she seen MP, discomfort on right side of the throat more than left. No fever. No body aches, denies difficulty eating or swallowing. She is requesting  amox in case it's strep.    Advised [facility], she is is asking for MP to consider as she was just in.

## 2024-05-03 NOTE — TELEPHONE ENCOUNTER
Patient came in stating she has sore throat-started 2 days ago-she wants Mariaelena Valle DO to refill her amoxicillin that she has taken before for illness

## 2024-05-03 NOTE — PROGRESS NOTES
Diagnosis:   Primary osteoarthritis of left knee (M17.12)         Referring Provider: Laurent Patterson  Date of Evaluation:    4/15/24    Precautions:  None Next MD visit:   none scheduled  Date of Surgery: 2021   Insurance Primary/Secondary: BLUE CROSS MEDICAID / N/A     # Auth Visits: [id number]            Subjective: Will be getting an injection in my right knee next week,  hopefully that will help.   Pain: 7/10 - no pain killers      Objective: AROM: (* denotes performed with pain)  Hip Knee Foot/Ankle   Flexion: R nl; L nl  Extension: R nl; L nl  Abduction: R nl; L nl  ER: R nl; L nl  IR: R nl; L nl Flexion: R 92; L nl  Extension: R -9; L nl     DF: R nl; L nl  PF: R nl; L nl  INV: R nl; L nl  EV: R nl; L nl  Great toe ext: R nl; L nl      Strength/MMT: (* denotes performed with pain)  Hip Knee Foot/Ankle   Flexion: R 4/5; L 4/5  Extension: R 4/5; L 4/5  Abduction: R 4/5; L 4/5  ER: R 4/5; L 4/5  IR: R 4/5; L 4/5 Flexion: R 4/5; L 4/5  Extension: R 4/5; L 4/5    DF: R 5/5; L 5/5  PF: R 5/5; L 5/5  INV: R 5/5; L 5/5  EV: R 5/5; L 5/5  Great toe ext: R 5/5; L 5/5      Gait: amb without AD today .  Foot flat on right le.    Balance: SLS: R 5 sec, L 15 sec    + extensor lag with SLR   No heel strike with gait      Assessment: Continues to have weakness shaquille quads.  extensor lag persists .  . Pt reported no adverse reaction in response to interventions today although expressed significant levels of fatigue. PT remains necessary to address ongoing deficits as identified at initial assessment.      Goals:   Goals: (to be met in 14 visits)  Pt will improve knee extension ROM to 0 deg to allow proper heel strike during gait and terminal knee extension in stance   Pt will improve knee AROM flexion to >92 degrees to improve ability to perform home , self care, transfers    Pt will improve quad strength to 5/5 to ascend 1 flight of stairs reciprocally without UE assist   Pt will increase hip and knee strength to grossly 4+/5  to be able to get up and down from the floor safely   Pt will demonstrate increased hip ER/ABD strength to 5/5 to perform stepping and squatting activities without excessive femoral IR/ADD   Pt will improve SLS to >5s to improve safety with gait on uneven surfaces such as grass and gravel  Pt will be independent and compliant with comprehensive HEP to maintain progress achieved in P    Plan:  Patient will be seen for 2 x/week or a total of 14 visits over a 90 day period. Treatment will include: Gait training, Manual Therapy, Neuromuscular Re-education, Self-Care Home Management, Therapeutic Activities, Therapeutic Exercise, and Home Exercise Program instruction   Date: 4/18/2024  TX#: 2/14 Date:     4/25/24            TX#: 3/ Date:       5/3/2024           TX#: 4/ Date:                 TX#: 5/ Date:   Tx#: 6/   Clamshells x 20 B   SL hip abduction x 20 B   Hooklying adductor squeeze x 20 w blue sports ball  Hooklying abduction w GTB x 20   Supine DKTC w RSB x 20   SKFO x 10 B   3 x 30 sec hamstring stretch w green strap B      NuStep x 6    SAQ x30 1.5 #   SLR x15   QS x20   Hooklying abduction w GTB x 20   TKE's x20 grn tb in // bars   Gastroc slant board x3 @ 30 sec each   Shuttle x20 31# squats   Sides step red TB x 2 laps 10 steps        NuStep x 6    SAQ x30 4 #   SLR x15 4#   QS x20   Hooklying abduction w GTB x 20   TKE's x20 grn tb in // bars   White slant board for gastroc stretch x3 @ 30 sec   Sides step grn TB x 2 laps 10 steps       STM to PF join x3'   Tib fib mobs to increase knee flex x3'   Tib fib mobs to increase extension    STM to PF join x3'   Tib fib mobs to increase knee flex x3'   Tib fib mobs to increase extension                    HEP: Jaelyn  SL hip abduction    Charges: Ther ex x 2     MT 1 Total Timed Treatment: 45 min  Total Treatment Time: 45 min

## 2024-05-03 NOTE — TELEPHONE ENCOUNTER
Mariaelena Valle, DO  [facility] Clinical Staff9 minutes ago (4:11 PM)     I did not assess this symptom. I agree with [facility] recommendation.       Attempted to call pt. Left VM to call back. Will also send a MCM

## 2024-05-06 ENCOUNTER — OFFICE VISIT (OUTPATIENT)
Dept: PHYSICAL THERAPY | Facility: HOSPITAL | Age: 59
End: 2024-05-06
Attending: PHYSICIAN ASSISTANT
Payer: MEDICAID

## 2024-05-06 PROCEDURE — 97140 MANUAL THERAPY 1/> REGIONS: CPT | Performed by: PHYSICAL THERAPIST

## 2024-05-06 PROCEDURE — 97110 THERAPEUTIC EXERCISES: CPT | Performed by: PHYSICAL THERAPIST

## 2024-05-06 NOTE — PROGRESS NOTES
Diagnosis:   Primary osteoarthritis of left knee (M17.12)         Referring Provider: Laurent Patterson  Date of Evaluation:    4/15/24    Precautions:  None Next MD visit:   none scheduled  Date of Surgery: 2021   Insurance Primary/Secondary: BLUE CROSS MEDICAID / N/A     # Auth Visits: [id number]            Subjective: Started wearing the knee brace that you recommended .  Feels  a little small.  Feels like its tight .  But it does feel like it helps.   Pain: 7/10 - no pain killers      Objective: AROM: (* denotes performed with pain)  Hip Knee Foot/Ankle   Flexion: R nl; L nl  Extension: R nl; L nl  Abduction: R nl; L nl  ER: R nl; L nl  IR: R nl; L nl Flexion: R 92; L nl  Extension: R -9; L nl     DF: R nl; L nl  PF: R nl; L nl  INV: R nl; L nl  EV: R nl; L nl  Great toe ext: R nl; L nl      Strength/MMT: (* denotes performed with pain)  Hip Knee Foot/Ankle   Flexion: R 4/5; L 4/5  Extension: R 4/5; L 4/5  Abduction: R 4/5; L 4/5  ER: R 4/5; L 4/5  IR: R 4/5; L 4/5 Flexion: R 4/5; L 4/5  Extension: R 4/5; L 4/5    DF: R 5/5; L 5/5  PF: R 5/5; L 5/5  INV: R 5/5; L 5/5  EV: R 5/5; L 5/5  Great toe ext: R 5/5; L 5/5      Gait: amb without AD today .  Foot flat on right le.    Balance: SLS: R 5 sec, L 15 sec    Observed knee sleeve in place;  knee sleeve tight with constriction in the right thigh region;  discussed need to purchase a larger size, measured for a large (vs small , which is what she has )     Assessment: Discussed and measured for new knee sleeve/compressive support for right knee pain .  She continues to be weak, with extensor lag noted, but is responding well shaquille with ecc control of her right LE.  Her gait continues to be flat foot but does improve with verbal cues.  PT remains necessary to address ongoing deficits as identified at initial assessment.      Goals:   Goals: (to be met in 14 visits)  Pt will improve knee extension ROM to 0 deg to allow proper heel strike during gait and terminal knee  extension in stance   Pt will improve knee AROM flexion to >92 degrees to improve ability to perform home , self care, transfers    Pt will improve quad strength to 5/5 to ascend 1 flight of stairs reciprocally without UE assist   Pt will increase hip and knee strength to grossly 4+/5 to be able to get up and down from the floor safely   Pt will demonstrate increased hip ER/ABD strength to 5/5 to perform stepping and squatting activities without excessive femoral IR/ADD   Pt will improve SLS to >5s to improve safety with gait on uneven surfaces such as grass and gravel  Pt will be independent and compliant with comprehensive HEP to maintain progress achieved in P    Plan:  Patient will be seen for 2 x/week or a total of 14 visits over a 90 day period. Treatment will include: Gait training, Manual Therapy, Neuromuscular Re-education, Self-Care Home Management, Therapeutic Activities, Therapeutic Exercise, and Home Exercise Program instruction   Date: 4/18/2024  TX#: 2/14 Date:     4/25/24            TX#: 3/ Date:       5/3/2024           TX#: 4/ Date:          5/6/2024        TX#: 5/ Date:   Tx#: 6/   Clamshells x 20 B   SL hip abduction x 20 B   Hooklying adductor squeeze x 20 w blue sports ball  Hooklying abduction w GTB x 20   Supine DKTC w RSB x 20   SKFO x 10 B   3 x 30 sec hamstring stretch w green strap B      NuStep x 6    SAQ x30 1.5 #   SLR x15   QS x20   Hooklying abduction w GTB x 20   TKE's x20 grn tb in // bars   Gastroc slant board x3 @ 30 sec each   Shuttle x20 31# squats   Sides step red TB x 2 laps 10 steps        NuStep x 6    SAQ x30 4 #   SLR x15 4#   QS x20   Hooklying abduction w GTB x 20   TKE's x20 grn tb in // bars   White slant board for gastroc stretch x3 @ 30 sec   Sides step grn TB x 2 laps 10 steps  Bike x6'    SAQ x30 4 #   SLR x15 4#   LAQ x20 4#   QS x20     White  balance board 2' each direction   Red tc side steps   Red TB TKEs 2x10            STM to PF join x3'   Tib fib mobs to  increase knee flex x3'   Tib fib mobs to increase extension    STM to PF join x3'   Tib fib mobs to increase knee flex x3'   Tib fib mobs to increase extension  STM to PF join x3'   Tib fib mobs to increase knee flex x3'                   HEP: Jaelyn DENNIS hip abduction    Charges: Ther ex x 2     MT 1 Total Timed Treatment: 45 min  Total Treatment Time: 45 min

## 2024-05-10 ENCOUNTER — OFFICE VISIT (OUTPATIENT)
Dept: PHYSICAL THERAPY | Facility: HOSPITAL | Age: 59
End: 2024-05-10
Attending: PHYSICIAN ASSISTANT
Payer: MEDICAID

## 2024-05-10 PROCEDURE — 97140 MANUAL THERAPY 1/> REGIONS: CPT | Performed by: PHYSICAL THERAPIST

## 2024-05-10 PROCEDURE — 97110 THERAPEUTIC EXERCISES: CPT | Performed by: PHYSICAL THERAPIST

## 2024-05-10 NOTE — PROGRESS NOTES
Diagnosis:   Primary osteoarthritis of left knee (M17.12)         Referring Provider: Laurent Patterson  Date of Evaluation:    4/15/24    Precautions:  None Next MD visit:   none scheduled  Date of Surgery: 2021   Insurance Primary/Secondary: BLUE CROSS MEDICAID / N/A     # Auth Visits: 14            Subjective: Got the bigger knee sleeve, feels so much better overall.  Knee doesn't hurt like it used to . Doesn't feel as swollen as it used to feel   Pain: 7/10 - no pain killers      Objective: AROM: (* denotes performed with pain)  Hip Knee   Flexion: R nl; L nl  Extension: R nl; L nl  Abduction: R nl; L nl  ER: R nl; L nl  IR: R nl; L nl Flexion: R 105; L nl  Extension: R -9; L nl          Strength/MMT: (* denotes performed with pain)  Knee   Flexion: R 4/5; L 4/5  Extension: R 4/5; L 4/5         Gait: heel strike absent on right le   Balance: SLS: R 5 sec, L 15 sec        Assessment: Knee sleeve with proper fitting,a dn with subjective relief of pain with wearing it .  Is improving and has noted increased ROM since last measured. PT remains necessary to address ongoing deficits as identified at initial assessment.      Goals:   Goals: (to be met in 14 visits)  Pt will improve knee extension ROM to 0 deg to allow proper heel strike during gait and terminal knee extension in stance   Pt will improve knee AROM flexion to >92 degrees to improve ability to perform home , self care, transfers    Pt will improve quad strength to 5/5 to ascend 1 flight of stairs reciprocally without UE assist   Pt will increase hip and knee strength to grossly 4+/5 to be able to get up and down from the floor safely   Pt will demonstrate increased hip ER/ABD strength to 5/5 to perform stepping and squatting activities without excessive femoral IR/ADD   Pt will improve SLS to >5s to improve safety with gait on uneven surfaces such as grass and gravel  Pt will be independent and compliant with comprehensive HEP to maintain progress  achieved in P    Plan:  Patient will be seen for 2 x/week or a total of 14 visits over a 90 day period. Treatment will include: Gait training, Manual Therapy, Neuromuscular Re-education, Self-Care Home Management, Therapeutic Activities, Therapeutic Exercise, and Home Exercise Program instruction   Date: 4/18/2024  TX#: 2/14 Date:     4/25/24            TX#: 3/ Date:       5/3/2024           TX#: 4/ Date:          5/6/2024        TX#: 5/ Date: 5/10/2024    Tx#: 6/   Clamshells x 20 B   SL hip abduction x 20 B   Hooklying adductor squeeze x 20 w blue sports ball  Hooklying abduction w GTB x 20   Supine DKTC w RSB x 20   SKFO x 10 B   3 x 30 sec hamstring stretch w green strap B      NuStep x 6    SAQ x30 1.5 #   SLR x15   QS x20   Hooklying abduction w GTB x 20   TKE's x20 grn tb in // bars   Gastroc slant board x3 @ 30 sec each   Shuttle x20 31# squats   Sides step red TB x 2 laps 10 steps        NuStep x 6    SAQ x30 4 #   SLR x15 4#   QS x20   Hooklying abduction w GTB x 20   TKE's x20 grn tb in // bars   White slant board for gastroc stretch x3 @ 30 sec   Sides step grn TB x 2 laps 10 steps  Bike x6'    SAQ x30 4 #   SLR x15 4#   LAQ x20 4#   QS x20     White  balance board 2' each direction   Red tc side steps   Red TB TKEs 2x10        Bike x6'    SAQ x30 4 #   SLR x15 4#   LAQ x20 4#   Grn TB side steps x10 , x5   Grn TB Forward walk x10     Heel taps 4\" x10   Slant board x3 @1' each   Ez slide x10 hip abd/hip ext with right knee  bend     STM to PF joint x3'   Tib fib mobs to increase knee flex x3'   Tib fib mobs to increase extension    STM to PF joint x3'   Tib fib mobs to increase knee flex x3'   Tib fib mobs to increase extension  STM to PF joint x3'   Tib fib mobs to increase knee flex x3'  Tib fib mobs to increase knee flex x3'                  HEP: Clamshells  SL hip abduction    Charges: Ther ex x 2     MT 1 Total Timed Treatment: 45 min  Total Treatment Time: 45 min

## 2024-05-13 ENCOUNTER — OFFICE VISIT (OUTPATIENT)
Dept: PHYSICAL THERAPY | Facility: HOSPITAL | Age: 59
End: 2024-05-13
Attending: INTERNAL MEDICINE
Payer: MEDICAID

## 2024-05-13 PROCEDURE — 97110 THERAPEUTIC EXERCISES: CPT | Performed by: PHYSICAL THERAPIST

## 2024-05-13 PROCEDURE — 97140 MANUAL THERAPY 1/> REGIONS: CPT | Performed by: PHYSICAL THERAPIST

## 2024-05-13 NOTE — PROGRESS NOTES
Diagnosis:   Primary osteoarthritis of left knee (M17.12)         Referring Provider: Laurent Patterson  Date of Evaluation:    4/15/24    Precautions:  None Next MD visit:   none scheduled  Date of Surgery: 2021   Insurance Primary/Secondary: BLUE CROSS MEDICAID / N/A     # Auth Visits: 14          15' late due to ride   Subjective: Did a lot of walking over the last few days.  Woke up with my knee pain increases, slept in the brace.  Feels like a storm is coming which I know makes my knee ache.  Scheduled for a n injection tomorrow.  Hope it helps.     Pain: 7/10 - no pain killers      Objective: AROM: (* denotes performed with pain)  Hip Knee   Flexion: R nl; L nl  Extension: R nl; L nl  Abduction: R nl; L nl  ER: R nl; L nl  IR: R nl; L nl Flexion: R 105; L nl  Extension: R -9; L nl          Strength/MMT: (* denotes performed with pain)  Knee   Flexion: R 4/5; L 4/5  Extension: R 4/5; L 4/5         Gait: heel strike absent on right le stiff knee gait today, with knee sleeve in place.   Balance: SLS: R 5 sec, L 15 sec        Assessment: is progressing but has a slight increase in her pain mostly due to increased activity level which included walking on an uneven surface and extended periods of walking.  Is improving but continues to be weak in her quads, shaquille ecc control and gait changes which include decreased heel strike.  Continues to be below functional  baseline     Goals:   Goals: (to be met in 14 visits)  Pt will improve knee extension ROM to 0 deg to allow proper heel strike during gait and terminal knee extension in stance   Pt will improve knee AROM flexion to >92 degrees to improve ability to perform home , self care, transfers    Pt will improve quad strength to 5/5 to ascend 1 flight of stairs reciprocally without UE assist   Pt will increase hip and knee strength to grossly 4+/5 to be able to get up and down from the floor safely   Pt will demonstrate increased hip ER/ABD strength to 5/5 to  perform stepping and squatting activities without excessive femoral IR/ADD   Pt will improve SLS to >5s to improve safety with gait on uneven surfaces such as grass and gravel  Pt will be independent and compliant with comprehensive HEP to maintain progress achieved in P    Plan:  Patient will be seen for 2 x/week or a total of 14 visits over a 90 day period. Treatment will include: Gait training, Manual Therapy, Neuromuscular Re-education, Self-Care Home Management, Therapeutic Activities, Therapeutic Exercise, and Home Exercise Program instruction   Date: 4/18/2024  TX#: 2/14 Date:     4/25/24            TX#: 3/ Date:       5/3/2024           TX#: 4/ Date:          5/6/2024        TX#: 5/ Date: 5/10/2024    Tx#: 6/ Date: 5/13/2024   Tx#:  7   Clamshells x 20 B   SL hip abduction x 20 B   Hooklying adductor squeeze x 20 w blue sports ball  Hooklying abduction w GTB x 20   Supine DKTC w RSB x 20   SKFO x 10 B   3 x 30 sec hamstring stretch w green strap B      NuStep x 6    SAQ x30 1.5 #   SLR x15   QS x20   Hooklying abduction w GTB x 20   TKE's x20 grn tb in // bars   Gastroc slant board x3 @ 30 sec each   Shuttle x20 31# squats   Sides step red TB x 2 laps 10 steps        NuStep x 6    SAQ x30 4 #   SLR x15 4#   QS x20   Hooklying abduction w GTB x 20   TKE's x20 grn tb in // bars   White slant board for gastroc stretch x3 @ 30 sec   Sides step grn TB x 2 laps 10 steps  Bike x6'    SAQ x30 4 #   SLR x15 4#   LAQ x20 4#   QS x20     White  balance board 2' each direction   Red tc side steps   Red TB TKEs 2x10        Bike x6'    SAQ x30 4 #   SLR x15 4#   LAQ x20 4#   Grn TB side steps x10 , x5   Grn TB Forward walk x10     Heel taps 4\" x10   Slant board x3 @1' each   Ez slide x10 hip abd/hip ext with right knee  bend  Bike x6'    SAQ x30 4 #   SLR x15 4#     Shuttle x20 with VMO 50#  Shuttle x50 # b squats x20   Single leg x20 50#       STM to PF joint x3'   Tib fib mobs to increase knee flex x3'   Tib fib mobs to  increase extension    STM to PF joint x3'   Tib fib mobs to increase knee flex x3'   Tib fib mobs to increase extension  STM to PF joint x3'   Tib fib mobs to increase knee flex x3'  Tib fib mobs to increase knee flex x3'  STM to right knee PF, lateral joint line, PF mobs into med/lateral, superior inferior glide/                   HEP: Clamshells  SL hip abduction    Charges: Ther ex x1     MT 1 Total Timed Treatment: 35 min  Total Treatment Time: 35min

## 2024-05-14 ENCOUNTER — HOSPITAL ENCOUNTER (OUTPATIENT)
Facility: HOSPITAL | Age: 59
Setting detail: HOSPITAL OUTPATIENT SURGERY
Discharge: HOME | End: 2024-05-14
Attending: ANESTHESIOLOGY | Admitting: ANESTHESIOLOGY

## 2024-05-14 ENCOUNTER — APPOINTMENT (OUTPATIENT)
Dept: GENERAL RADIOLOGY | Facility: HOSPITAL | Age: 59
End: 2024-05-14
Attending: ANESTHESIOLOGY

## 2024-05-14 VITALS
HEART RATE: 68 BPM | TEMPERATURE: 97 F | HEIGHT: 66 IN | OXYGEN SATURATION: 90 % | WEIGHT: 176 LBS | BODY MASS INDEX: 28.28 KG/M2 | RESPIRATION RATE: 16 BRPM | DIASTOLIC BLOOD PRESSURE: 80 MMHG | SYSTOLIC BLOOD PRESSURE: 109 MMHG

## 2024-05-14 LAB — GLUCOSE BLD-MCNC: 93 MG/DL (ref 70–99)

## 2024-05-14 PROCEDURE — 3E0T33Z INTRODUCTION OF ANTI-INFLAMMATORY INTO PERIPHERAL NERVES AND PLEXI, PERCUTANEOUS APPROACH: ICD-10-PCS | Performed by: ANESTHESIOLOGY

## 2024-05-14 PROCEDURE — 3E0T3BZ INTRODUCTION OF ANESTHETIC AGENT INTO PERIPHERAL NERVES AND PLEXI, PERCUTANEOUS APPROACH: ICD-10-PCS | Performed by: ANESTHESIOLOGY

## 2024-05-14 PROCEDURE — 64454 NJX AA&/STRD GNCLR NRV BRNCH: CPT | Performed by: ANESTHESIOLOGY

## 2024-05-14 RX ORDER — ONDANSETRON 2 MG/ML
4 INJECTION INTRAMUSCULAR; INTRAVENOUS ONCE AS NEEDED
Status: DISCONTINUED | OUTPATIENT
Start: 2024-05-14 | End: 2024-05-14

## 2024-05-14 RX ORDER — METHYLPREDNISOLONE ACETATE 40 MG/ML
INJECTION, SUSPENSION INTRA-ARTICULAR; INTRALESIONAL; INTRAMUSCULAR; SOFT TISSUE
Status: DISCONTINUED | OUTPATIENT
Start: 2024-05-14 | End: 2024-05-14

## 2024-05-14 RX ORDER — DIPHENHYDRAMINE HYDROCHLORIDE 50 MG/ML
50 INJECTION INTRAMUSCULAR; INTRAVENOUS ONCE AS NEEDED
Status: DISCONTINUED | OUTPATIENT
Start: 2024-05-14 | End: 2024-05-14

## 2024-05-14 RX ORDER — BUPIVACAINE HYDROCHLORIDE 5 MG/ML
INJECTION, SOLUTION EPIDURAL; INTRACAUDAL
Status: DISCONTINUED | OUTPATIENT
Start: 2024-05-14 | End: 2024-05-14

## 2024-05-14 RX ORDER — SODIUM CHLORIDE, SODIUM LACTATE, POTASSIUM CHLORIDE, CALCIUM CHLORIDE 600; 310; 30; 20 MG/100ML; MG/100ML; MG/100ML; MG/100ML
100 INJECTION, SOLUTION INTRAVENOUS CONTINUOUS
Status: DISCONTINUED | OUTPATIENT
Start: 2024-05-14 | End: 2024-05-14

## 2024-05-14 RX ORDER — MIDAZOLAM HYDROCHLORIDE 1 MG/ML
INJECTION INTRAMUSCULAR; INTRAVENOUS
Status: DISCONTINUED | OUTPATIENT
Start: 2024-05-14 | End: 2024-05-14

## 2024-05-14 RX ORDER — LIDOCAINE HYDROCHLORIDE 10 MG/ML
INJECTION, SOLUTION EPIDURAL; INFILTRATION; INTRACAUDAL; PERINEURAL
Status: DISCONTINUED | OUTPATIENT
Start: 2024-05-14 | End: 2024-05-14

## 2024-05-14 NOTE — H&P
History & Physical Examination    Patient Name: Sandra Roche  MRN: [id number]  [facility]: [id number]  YOB: 1965    Pre-Operative Diagnosis:  Primary osteoarthritis of right knee [M17.11]    Present Illness: Primary osteoarthritis right knee    Facility-Administered Medications Prior to Admission   Medication Dose Route Frequency Provider Last Rate Last Admin    [COMPLETED] triamcinolone acetonide (Kenalog-40) 40 MG/ML injection 40 mg  40 mg Intra-articular Once Laurent Patterson PA-C   40 mg at 24 1038    [COMPLETED] hylan G-F 20 (Synvisc-One) 48 MG/6ML intra-articular injection 48 mg  6 mL Intra-articular Once Laurent Patterson PA-C   48 mg at 24 1038     Medications Prior to Admission   Medication Sig Dispense Refill Last Dose    metFORMIN  MG Oral Tablet 24 Hr Take 1 tablet (500 mg total) by mouth daily. 90 tablet 3 2024 at 1800    amLODIPine 5 MG Oral Tab Take 1 tablet (5 mg total) by mouth daily. 30 tablet 0     atorvastatin 40 MG Oral Tab Take 1 tablet (40 mg total) by mouth nightly. 90 tablet 0     HYDROcodone-acetaminophen (NORCO) 5-325 MG Oral Tab Take 1 tablet by mouth every 6 (six) hours as needed for Pain. 15 tablet 0 More than a month    ergocalciferol 1.25 MG (69801 UT) Oral Cap Take 1 capsule (50,000 Units total) by mouth once a week. 6 capsule 0     zolpidem 10 MG Oral Tab Take 1 tablet (10 mg total) by mouth nightly as needed for Sleep. 90 tablet 0     [] fluticasone propionate 50 MCG/ACT Nasal Suspension 2 sprays by Nasal route daily. 16 g 0     [] cetirizine 10 MG Oral Tab Take 1 tablet (10 mg total) by mouth daily. 30 tablet 0     docusate sodium 100 MG Oral Cap Take 1 capsule (100 mg total) by mouth 2 (two) times daily. 60 capsule 2     Triamterene-HCTZ 37.5-25 MG Oral Tab Take 1 tablet by mouth daily. 90 tablet 0     albuterol 108 (90 Base) MCG/ACT Inhalation Aero Soln Inhale 2 puffs into the lungs 4 (four) times daily as needed. 1 each 0       Current Facility-Administered Medications   Medication Dose Route Frequency    lactated ringers infusion  100 mL/hr Intravenous Continuous    ondansetron (Zofran) 4 MG/2ML injection 4 mg  4 mg Intravenous Once PRN       Allergies:   Allergies   Allergen Reactions    Seasonal OTHER (SEE COMMENTS)     Watery eyes, sneezing        Past Medical History:    Diabetes (HCC)    Diverticulosis of large intestine    Esophageal reflux    Essential hypertension    High blood pressure    High cholesterol    Osteoarthritis    Pneumonia due to organism    Stroke (HCC)    2008    Visual impairment    READING GLASSES     Past Surgical History:   Procedure Laterality Date    Colonoscopy N/A 9/7/2023    Procedure: COLONOSCOPY;  Surgeon: Evelyn Coles MD;  Location:  ENDOSCOPY    Exploratory of abdomen      Knee replacement surgery       Family History   Problem Relation Age of Onset    Heart Disorder Father     Cancer Mother     Hypertension Mother      Social History     Tobacco Use    Smoking status: Every Day     Current packs/day: 0.10     Types: Cigarettes    Smokeless tobacco: Never   Substance Use Topics    Alcohol use: Yes     Comment: occasional       SYSTEM Check if Review is Normal Check if Physical Exam is Normal If not normal, please explain:   HEENT [x ] [x ]    NECK & BACK [x ] [x ]    HEART [x ] [x ]    LUNGS [x ] [x ]    ABDOMEN [x ] [x ]    UROGENITAL [x ] [x ]    EXTREMITIES [x ] [x ]    OTHER        [ x ] I have discussed the risks and benefits and alternatives with the patient/family.  They understand and agree to proceed with plan of care.  [ x ] I have reviewed the History and Physical done within the last 30 days.  Any changes noted above.    Bipin Brown MD

## 2024-05-14 NOTE — DISCHARGE INSTRUCTIONS
Home Care Instructions Following Your Pain Procedure     Sandra,  It has been a pleasure to have you as our patient. To help you at home, you must follow these general discharge instructions. We will review these with you before you are discharged. It is our hope that you have a complete and uneventful recovery from our procedure.     General Instructions:  What to Expect:  Bandages from your procedure today can be removed when you get home.  Please avoid soaking and/or swimming for 24 hours.  Showering is okay  It is normal to have increased pain symptoms and/or pain at injection site for up to 3-5 days after procedure, you can use heat or ice (20 minutes on 20 minutes off) for comfort.  You may experience some temporary side effects which may include restlessness or insomnia, flushing of the face, or heart palpitations.  Please contact the provider if these symptoms do not resolve within 3-4 days.  Lightheadedness or nausea may occur and should resolve within 24 to 48 hours.  If you develop a headache after treatment, rest, drink fluids (with caffeine, if possible) and take mild over-the-counter pain medication.  If the headache does not improve with the above treatment, contact the physician.  Home Medications:  Resume all previously prescribed medication.  Please avoid taking NSAIDs (Non-Steriodal Anti-Inflammatory Drugs) such as:  Ibuprofen ( Advil, Motrin) Aleve (Naproxen), Diclofenac, Meloxicam for 6 hours after procedure.   If you are on Coumadin (Warfarin) or any other anti-coagulant (or \"blood thinning\") medication such as Plavix (Clopidogrel), Xarelto (Rivaroxaban), Eliquis (Apixaban), Effient (Prasugrel) etc., restart on the following day from the procedure unless otherwise directed by your provider.  If you are a diabetic, please increase the frequency of your glucose monitoring after the procedure as steroids may cause a temporary (2-3 day) increase in your blood sugar.  Contact your primary care  physician if your blood sugar remains elevated as you may require some medication adjustment.  Diet:  Resume your regular diet as tolerated.  Activity:  We recommend that you relax and rest during the rest of your procedure day.  If you feel weakness in your arms or legs do not drive.  Follow-up Appointment  Please schedule a follow-up visit within 3 to 4 weeks after your last procedure date.  Question or Concerns:  Feel free to call our office with any questions or concerns at [phone] (option #2)    Sandra  Thank you for coming to [facility] for your procedure.  The nurses try very hard to make sure you receive the best care possible.  Your trust in them as well as us is greatly appreciated.    Thanks so much,   Dr. Bipin Brown

## 2024-05-14 NOTE — OPERATIVE REPORT
[facility]  Operative Report  2024     Sandra Roche Patient Status:  Hospital Outpatient Surgery    1965 MRN [id number]   Location [facility] ENDOSCOPY PAIN [facility]ENTER Attending Bipin Brown MD   Hosp Day # 0 PCP Mariaelena Valle DO     Indication: Sandra is a 59 year old female with right knee pain    Preoperative Diagnosis:  Primary osteoarthritis of right knee [M17.11]    Postoperative Diagnosis: Same as above.    Procedure performed: right GENICULAR NERVE BLOCK with sedation    Anesthesia: Local and IV Sedation.    EBL: Less than 1 ml.    Procedure Description:  After reviewing the patient's history and performing a focused physical examination, the diagnosis and positive previous diagnostic tests were confirmed and contraindications such as infection and coagulopathy were ruled out.  Following review of allergies and potential side effects and complications, including but not necessarily limited to infection, allergic reaction, local tissue breakdown, nerve injury,  and paresis, the patient consented for the procedure.    The patient was brought to the procedure room in the supine position.  After comprehensive monitors were applied and IV access in the patient's arm, moderate intravenous sedation was given with versed and fentanyl. Moderate sedation was given for 9 minutes.  The operative knee was then identified and prepped and draped in the usual sterile fashion.  AP imaging was used to identify the path of the superior medial, superior lateral, inferior medial genicular nerves.  The overlying soft tissue was then anesthetized with 3 cc 1% lidocaine at each location.  Subsequently, a 3.5 inch 22-gauge spinal needle was advanced imaging guidance to contact bone along the path of the superior medial, superior lateral, and inferior medial genicular nerves.  Needle position was confirmed on both AP and lateral views.  After negative aspiration for heme, total mixture of 40 mg of  Depo-Medrol with 9 cc of 0.5% bupivacaine was then evenly divided amongst the 3 sites.  The needle was then flushed with 1 cc 1% lidocaine, re-styletted and removed with tip intact.   The patient tolerated the procedure very well without significant immediate complication.          Complications: None.    Follow up: The patient was followed in the pain clinic as needed basis.          Bipin Brown MD

## 2024-05-15 ENCOUNTER — TELEPHONE (OUTPATIENT)
Dept: PAIN CLINIC | Facility: CLINIC | Age: 59
End: 2024-05-15

## 2024-05-21 ENCOUNTER — APPOINTMENT (OUTPATIENT)
Dept: GENERAL RADIOLOGY | Age: 59
End: 2024-05-21
Attending: EMERGENCY MEDICINE

## 2024-05-21 ENCOUNTER — HOSPITAL ENCOUNTER (OUTPATIENT)
Age: 59
Discharge: HOME | End: 2024-05-21
Attending: EMERGENCY MEDICINE

## 2024-05-21 ENCOUNTER — OFFICE VISIT (OUTPATIENT)
Dept: PHYSICAL THERAPY | Facility: HOSPITAL | Age: 59
End: 2024-05-21
Attending: INTERNAL MEDICINE
Payer: MEDICAID

## 2024-05-21 ENCOUNTER — HOSPITAL ENCOUNTER (OUTPATIENT)
Dept: CV DIAGNOSTICS | Age: 59
Discharge: HOME | End: 2024-05-21
Attending: INTERNAL MEDICINE

## 2024-05-21 VITALS
SYSTOLIC BLOOD PRESSURE: 122 MMHG | DIASTOLIC BLOOD PRESSURE: 80 MMHG | BODY MASS INDEX: 27.94 KG/M2 | TEMPERATURE: 97 F | WEIGHT: 178 LBS | RESPIRATION RATE: 18 BRPM | OXYGEN SATURATION: 100 % | HEIGHT: 67 IN | HEART RATE: 69 BPM

## 2024-05-21 DIAGNOSIS — R06.02 SHORTNESS OF BREATH: ICD-10-CM

## 2024-05-21 DIAGNOSIS — M25.511 ACUTE PAIN OF RIGHT SHOULDER: Primary | ICD-10-CM

## 2024-05-21 PROCEDURE — 93306 TTE W/DOPPLER COMPLETE: CPT | Performed by: INTERNAL MEDICINE

## 2024-05-21 PROCEDURE — 97140 MANUAL THERAPY 1/> REGIONS: CPT | Performed by: PHYSICAL THERAPIST

## 2024-05-21 PROCEDURE — 97110 THERAPEUTIC EXERCISES: CPT | Performed by: PHYSICAL THERAPIST

## 2024-05-21 PROCEDURE — 99213 OFFICE O/P EST LOW 20 MIN: CPT

## 2024-05-21 PROCEDURE — 73030 X-RAY EXAM OF SHOULDER: CPT | Performed by: EMERGENCY MEDICINE

## 2024-05-21 PROCEDURE — 99214 OFFICE O/P EST MOD 30 MIN: CPT

## 2024-05-21 RX ORDER — NAPROXEN 500 MG/1
500 TABLET ORAL 2 TIMES DAILY PRN
Qty: 20 TABLET | Refills: 0 | Status: SHIPPED | OUTPATIENT
Start: 2024-05-21 | End: 2024-05-31

## 2024-05-21 NOTE — ED PROVIDER NOTES
Patient Seen in: [facility]      History     Chief Complaint   Patient presents with    Shoulder Pain     Stated Complaint: Shoulder pain    Subjective:   HPI    59-year-old female presents with right shoulder pain starting approximately 1 week ago.  She does not recall any preceding injury.  She states the pain is worse with movement such as lifting her arm above her shoulder.  Denies any pain at rest.  Denies pain rating down her arm.  Denies numbness or tingling in her arm.  She took some ibuprofen with improvement.    Objective:   Past Medical History:    Diabetes (HCC)    Diverticulosis of large intestine    Esophageal reflux    Essential hypertension    High blood pressure    High cholesterol    Osteoarthritis    Pneumonia due to organism    Stroke (HCC)    2008    Visual impairment    READING GLASSES              Past Surgical History:   Procedure Laterality Date    Colonoscopy N/A 9/7/2023    Procedure: COLONOSCOPY;  Surgeon: Evelyn Coles MD;  Location:  ENDOSCOPY    Exploratory of abdomen      Knee replacement surgery                  Social History     Socioeconomic History    Marital status: Legally    Tobacco Use    Smoking status: Every Day     Current packs/day: 0.10     Types: Cigarettes    Smokeless tobacco: Never   Vaping Use    Vaping status: Never Used   Substance and Sexual Activity    Alcohol use: Yes     Comment: occasional    Drug use: Never     Social Determinants of Health     Financial Resource Strain: Low Risk  (3/25/2024)    Financial Resource Strain     Difficulty of Paying Living Expenses: Not very hard     Med Affordability: No   Food Insecurity: No Food Insecurity (3/20/2024)    Food Insecurity     Food Insecurity: Never true   Transportation Needs: No Transportation Needs (3/25/2024)    Transportation Needs     Lack of Transportation: No   Housing Stability: Low Risk  (3/20/2024)    Housing Stability     Housing Instability: No              Review  of Systems    Positive for stated complaint: Shoulder pain  Other systems are as noted in HPI.  Constitutional and vital signs reviewed.      All other systems reviewed and negative except as noted above.    Physical Exam     ED Triage Vitals [05/21/24 1125]   /80   Pulse 69   Resp 18   Temp 97.4 °F (36.3 °C)   Temp src Temporal   SpO2 100 %   O2 Device None (Room air)       Current Vitals:   Vital Signs  BP: 122/80  Pulse: 69  Resp: 18  Temp: 97.4 °F (36.3 °C)  Temp src: Temporal    Oxygen Therapy  SpO2: 100 %  O2 Device: None (Room air)            Physical Exam  Vitals and nursing note reviewed.   Constitutional:       Appearance: She is well-developed.   HENT:      Head: Normocephalic and atraumatic.      Mouth/Throat:      Mouth: Mucous membranes are moist.   Eyes:      General: No scleral icterus.  Musculoskeletal:      Comments: Right shoulder with mild tenderness to the superior and posterior aspect  Normal active range of motion  Increased pain with external rotation     Skin:     General: Skin is warm and dry.   Neurological:      General: No focal deficit present.      Mental Status: She is alert and oriented to person, place, and time.      Cranial Nerves: No cranial nerve deficit.      Motor: No weakness.   Psychiatric:         Mood and Affect: Mood normal.         Behavior: Behavior normal.               ED Course   Labs Reviewed - No data to display          XR SHOULDER, COMPLETE (MIN 2 VIEWS), RIGHT (CPT=73030)    Result Date: 5/21/2024  CONCLUSION:   Negative for glenohumeral fracture or malalignment.  Normal glenohumeral and acromioclavicular joints with preservation of the subacromial interval.  The visualized bony thorax and regional soft tissues are within normal limits.    LOCATION:  [facility]   Dictated by (CST): Ana María Renee MD on 5/21/2024 at 12:15 PM     Finalized by (CST): Ana María Renee MD on 5/21/2024 at 12:16 PM               MDM   59-year-old female presents with right shoulder pain  starting approximately 1 week ago.     Differential includes but is not limited to shoulder osteoarthritis, rotator cuff injury, strain    Independent interpretation of shoulder x-rays show no significant osteoarthritic changes.  Radiology report reviewed as above.  Symptoms likely due to rotator cuff strain.  Will treat with Rx for naproxen.  Instructed to follow-up with PCP or Ortho for further outpatient management.  Return precaution discussed.        Medical Decision Making      Disposition and Plan     Clinical Impression:  1. Acute pain of right shoulder         Disposition:  Discharge  5/21/2024 12:24 pm    Follow-up:  Mariaelena Valle DO  [address]  [facility] [id number] [phone]    Schedule an appointment as soon as possible for a visit       [facility] Orthoped[facility]  [address]  [facility] [address]  [phone]  Schedule an appointment as soon as possible for a visit             Medications Prescribed:  Discharge Medication List as of 5/21/2024 12:25 PM        START taking these medications    Details   naproxen 500 MG Oral Tab Take 1 tablet (500 mg total) by mouth 2 (two) times daily as needed., Normal, Disp-20 tablet, R-0

## 2024-05-21 NOTE — PROGRESS NOTES
Diagnosis:   Primary osteoarthritis of left knee (M17.12)         Referring Provider: Laurent Patterson  Date of Evaluation:    4/15/24    Precautions:  None Next MD visit:   none scheduled  Date of Surgery: 2021   Insurance Primary/Secondary: BL[employer]OSS MEDICAID / N/A     # Auth Visits: 14            Subjective:Had the knee injection , I think they gave me the shots on the inside of my knee, but have not noticed any improvement in my pain .   Pain: 7/10 - no pain killers      Objective: AROM: (* denotes performed with pain)  Hip Knee   Flexion: R nl; L nl  Extension: R nl; L nl  Abduction: R nl; L nl  ER: R nl; L nl  IR: R nl; L nl Flexion: R 105; L nl  Extension: R -9; L nl          Strength/MMT: (* denotes performed with pain)  Knee   Flexion: R 4/5; L 4/5  Extension: R 4/5; L 4/5         Gait: heel strike absent on right le stiff knee gait today, with knee sleeve in place.   Balance: SLS: R 5 sec, L 15 sec        Assessment: is progressing but has a slight increase in her pain mostly due to increased activity level which included walking on an uneven surface and extended periods of walking.  Is improving but continues to be weak in her quads, shaquille ecc control and gait changes which include decreased heel strike.  Continues to be below functional  baseline     Goals:   Goals: (to be met in 14 visits)  Pt will improve knee extension ROM to 0 deg to allow proper heel strike during gait and terminal knee extension in stance   Pt will improve knee AROM flexion to >92 degrees to improve ability to perform home , self care, transfers    Pt will improve quad strength to 5/5 to ascend 1 flight of stairs reciprocally without UE assist   Pt will increase hip and knee strength to grossly 4+/5 to be able to get up and down from the floor safely   Pt will demonstrate increased hip ER/ABD strength to 5/5 to perform stepping and squatting activities without excessive femoral IR/ADD   Pt will improve SLS to >5s to improve  safety with gait on uneven surfaces such as grass and gravel  Pt will be independent and compliant with comprehensive HEP to maintain progress achieved in P    Plan:  Patient will be seen for 2 x/week or a total of 14 visits over a 90 day period. Treatment will include: Gait training, Manual Therapy, Neuromuscular Re-education, Self-Care Home Management, Therapeutic Activities, Therapeutic Exercise, and Home Exercise Program instruction   Date: 4/18/2024  TX#: 2/14 Date:     4/25/24            TX#: 3/ Date:       5/3/2024           TX#: 4/ Date:          5/6/2024        TX#: 5/ Date: 5/10/2024    Tx#: 6/ Date: 5/13/2024   Tx#:  7 Date: 5/21/2024   Tx#:     Clamshells x 20 B   SL hip abduction x 20 B   Hooklying adductor squeeze x 20 w blue sports ball  Hooklying abduction w GTB x 20   Supine DKTC w RSB x 20   SKFO x 10 B   3 x 30 sec hamstring stretch w green strap B      NuStep x 6    SAQ x30 1.5 #   SLR x15   QS x20   Hooklying abduction w GTB x 20   TKE's x20 grn tb in // bars   Gastroc slant board x3 @ 30 sec each   Shuttle x20 31# squats   Sides step red TB x 2 laps 10 steps        NuStep x 6    SAQ x30 4 #   SLR x15 4#   QS x20   Hooklying abduction w GTB x 20   TKE's x20 grn tb in // bars   White slant board for gastroc stretch x3 @ 30 sec   Sides step grn TB x 2 laps 10 steps  Bike x6'    SAQ x30 4 #   SLR x15 4#   LAQ x20 4#   QS x20     White  balance board 2' each direction   Red tc side steps   Red TB TKEs 2x10        Bike x6'    SAQ x30 4 #   SLR x15 4#   LAQ x20 4#   Grn TB side steps x10 , x5   Grn TB Forward walk x10     Heel taps 4\" x10   Slant board x3 @1' each   Ez slide x10 hip abd/hip ext with right knee  bend  Bike x6'    SAQ x30 4 #   SLR x15 4#     Shuttle x20 with VMO 50#  Shuttle x50 # b squats x20   Single leg x20 50#    Bike x6'    Prone hang x2' 2#  Shuttle x20 with VMO 50#  Shuttle x50 # b squats x20   Single leg x20 50#   Balance board for calf stretch x3@30 sec   Balance board x2'  each direction   TKE with red TB x20     STM to PF joint x3'   Tib fib mobs to increase knee flex x3'   Tib fib mobs to increase extension    STM to PF joint x3'   Tib fib mobs to increase knee flex x3'   Tib fib mobs to increase extension  STM to PF joint x3'   Tib fib mobs to increase knee flex x3'  Tib fib mobs to increase knee flex x3'  STM to right knee PF, lateral joint line, PF mobs into med/lateral, superior inferior glide/ STM to right knee PF, lateral joint line, PF mobs into med/lateral, superior inferior glide/                     HEP: Jaelyn DENNIS hip abduction    Charges: Ther ex x2   MT 1 Total Timed Treatment: 45 min  Total Treatment Time: 45min

## 2024-05-22 ENCOUNTER — TELEPHONE (OUTPATIENT)
Dept: PHYSICAL THERAPY | Facility: HOSPITAL | Age: 59
End: 2024-05-22

## 2024-05-23 ENCOUNTER — APPOINTMENT (OUTPATIENT)
Dept: PHYSICAL THERAPY | Facility: HOSPITAL | Age: 59
End: 2024-05-23
Attending: INTERNAL MEDICINE
Payer: MEDICAID

## 2024-05-29 ENCOUNTER — OFFICE VISIT (OUTPATIENT)
Dept: PAIN CLINIC | Facility: CLINIC | Age: 59
End: 2024-05-29

## 2024-05-29 VITALS
BODY MASS INDEX: 28 KG/M2 | WEIGHT: 178 LBS | SYSTOLIC BLOOD PRESSURE: 128 MMHG | HEART RATE: 68 BPM | OXYGEN SATURATION: 98 % | DIASTOLIC BLOOD PRESSURE: 74 MMHG

## 2024-05-29 DIAGNOSIS — Z96.651 STATUS POST RIGHT KNEE REPLACEMENT: Primary | ICD-10-CM

## 2024-05-29 PROCEDURE — 99214 OFFICE O/P EST MOD 30 MIN: CPT | Performed by: ANESTHESIOLOGY

## 2024-05-29 RX ORDER — GABAPENTIN 100 MG/1
100 CAPSULE ORAL 3 TIMES DAILY
Qty: 90 CAPSULE | Refills: 0 | Status: SHIPPED | OUTPATIENT
Start: 2024-05-29

## 2024-05-29 NOTE — PROGRESS NOTES
Name: Sandra Roche   : 1965   DOS: 2024     Pain Clinic Follow Up Visit:     Chief Complaint   Patient presents with    Procedure Follow Up     right GENICULAR NERVE BLOCK with sedation       Sandra Roche is a 59 year old female with a history of total knee arthroplasty and revision knee surgery here for follow-up.  The patient is status post diagnostic genicular nerve block.  Unfortunately, only obtain relief for 1 day.  Patient had difficulty quantifying amount of relief.  States that her pain postinjection was a 9 out of 10 but also reported greater than 80% relief.  However, reports baseline pain of 7-8 out of 10.  Regardless, patient did not have any improved activity level.  Pt denies any chills, fever, or weakness. There is no bladder or bowel incontinence associated with the pain.    REVIEW OF SYSTEMS:  A ten point review of systems was performed with pertinent positives and negatives in the HPI.    Allergies   Allergen Reactions    Seasonal OTHER (SEE COMMENTS)     Watery eyes, sneezing        Current Outpatient Medications   Medication Sig Dispense Refill    gabapentin 100 MG Oral Cap Take 1 capsule (100 mg total) by mouth 3 (three) times daily. 90 capsule 0    naproxen 500 MG Oral Tab Take 1 tablet (500 mg total) by mouth 2 (two) times daily as needed. 20 tablet 0    amLODIPine 5 MG Oral Tab Take 1 tablet (5 mg total) by mouth daily. 90 tablet 0    atorvastatin 40 MG Oral Tab Take 1 tablet (40 mg total) by mouth nightly. 90 tablet 0    HYDROcodone-acetaminophen (NORCO) 5-325 MG Oral Tab Take 1 tablet by mouth every 6 (six) hours as needed for Pain. 15 tablet 0    ergocalciferol 1.25 MG (52982 UT) Oral Cap Take 1 capsule (50,000 Units total) by mouth once a week. 6 capsule 0    zolpidem 10 MG Oral Tab Take 1 tablet (10 mg total) by mouth nightly as needed for Sleep. 90 tablet 0    metFORMIN  MG Oral Tablet 24 Hr Take 1 tablet (500 mg total) by mouth daily. 90 tablet 3    docusate  sodium 100 MG Oral Cap Take 1 capsule (100 mg total) by mouth 2 (two) times daily. 60 capsule 2    Triamterene-HCTZ 37.5-25 MG Oral Tab Take 1 tablet by mouth daily. 90 tablet 0    albuterol 108 (90 Base) MCG/ACT Inhalation Aero Soln Inhale 2 puffs into the lungs 4 (four) times daily as needed. 1 each 0         EXAM:   /74   Pulse 68   Wt 178 lb (80.7 kg)   SpO2 98%   BMI 27.88 kg/m²   General:  Patient is a(n) 59 year old year old female in no acute distress.  Neurologic:: WNL-Orientation to time, place and person, normal mood & affect, concentration & attention span intact.   Inspection:  Ambulates with well-coordinated, fluid, non-antalgic gait.  Gait is normal.  Neck: Full range of motion  Cranial nerve: Grossly intact  Respiratory: Nonlabored  Back: Gait is intact   IMAGES:   No new imaging      ASSESSMENT AND PLAN:     1. Status post right knee replacement        The patient is status post right knee arthroplasty followed by revision surgery.  Patient complains of pain along the knee.  Had questionable response to genicular nerve block.  Therefore, I do not believe that radiofrequency ablation of genicular nerve would lead to any significant improvement.  Did prescribe gabapentin.  Patient's primary complaint is range of motion.  Encourage patient continue with physical therapy.    Pain is  limiting functional status. Failed conservative treatment consisting of medications, activity modification, and  PT.  1.Risks of long term narcotic use d/w pt including dependence, abuse, and death from overdose and mixing narcotic with alcohol. Pt verbalized understanding.     Medications filled today:  Requested Prescriptions     Signed Prescriptions Disp Refills    gabapentin 100 MG Oral Cap 90 capsule 0     Sig: Take 1 capsule (100 mg total) by mouth 3 (three) times daily.     Orders:No orders of the defined types were placed in this encounter.        Radiology orders and consultations:None  The patient  indicates understanding of these issues and agrees to the plan.  No follow-ups on file.    Bipin Brown MD, 5/29/2024, 10:08 AM

## 2024-05-29 NOTE — PATIENT INSTRUCTIONS
Refill policies:    Allow 2-3 business days for refills; controlled substances may take longer.  Contact your pharmacy at least 5 days prior to running out of medication and have them send an electronic request or submit request through the “request refill” option in your [employer] account.  Refills are not addressed on weekends; covering physicians do not authorize routine medications on weekends.  No narcotics or controlled substances are refilled after noon on Fridays or by on call physicians.  By law, narcotics must be electronically prescribed.  A 30 day supply with no refills is the maximum allowed.  If your prescription is due for a refill, you may be due for a follow up appointment.  To best provide you care, patients receiving routine medications need to be seen at least once a year.  Patients receiving narcotic/controlled substance medications need to be seen at least once every 3 months.  In the event that your preferred pharmacy does not have the requested medication in stock (e.g. Backordered), it is your responsibility to find another pharmacy that has the requested medication available.  We will gladly send a new prescription to that pharmacy at your request.    Scheduling Tests:    If your physician has ordered radiology tests such as MRI or CT scans, please contact Central Scheduling at [phone] right away to schedule the test.  Once scheduled, the [facility] Centralized Referral Team will work with your insurance carrier to obtain pre-certification or prior authorization.  Depending on your insurance carrier, approval may take 3-10 days.  It is highly recommended patients assure they have received an authorization before having a test performed.  If test is done without insurance authorization, patient may be responsible for the entire amount billed.      Precertification and Prior Authorizations:  If your physician has recommended that you have a procedure or additional testing performed the [facility]  Centralized Referral Team will contact your insurance carrier to obtain pre-certification or prior authorization.    You are strongly encouraged to contact your insurance carrier to verify that your procedure/test has been approved and is a COVERED benefit.  Although the [facility] Centralized Referral Team does its due diligence, the insurance carrier gives the disclaimer that \"Although the procedure is authorized, this does not guarantee payment.\"    Ultimately the patient is responsible for payment.   Thank you for your understanding in this matter.  Paperwork Completion:  If you require FMLA or disability paperwork for your recovery, please make sure to either drop it off or have it faxed to our office at [phone]. Be sure the form has your name and date of birth on it.  The form will be faxed to our Forms Department and they will complete it for you.  There is a 25$ fee for all forms that need to be filled out.  Please be aware there is a 10-14 day turnaround time.  You will need to sign a release of information (LEANN) form if your paperwork does not come with one.  You may call the Forms Department with any questions at [phone].  Their fax number is [phone].

## 2024-05-29 NOTE — PROGRESS NOTES
Last procedure: right GENICULAR NERVE BLOCK with sedation   Date: 05/14/24  Percentage of relief obtained: 0  Duration of relief: NA    Current Pain Score: 7

## 2024-05-30 ENCOUNTER — OFFICE VISIT (OUTPATIENT)
Dept: PHYSICAL THERAPY | Facility: HOSPITAL | Age: 59
End: 2024-05-30
Attending: INTERNAL MEDICINE
Payer: MEDICAID

## 2024-05-30 PROCEDURE — 97110 THERAPEUTIC EXERCISES: CPT | Performed by: PHYSICAL THERAPIST

## 2024-05-30 PROCEDURE — 97140 MANUAL THERAPY 1/> REGIONS: CPT | Performed by: PHYSICAL THERAPIST

## 2024-05-30 NOTE — PROGRESS NOTES
Diagnosis:   Primary osteoarthritis of left knee (M17.12)         Referring Provider: Laurent Patterson  Date of Evaluation:    4/15/24    Precautions:  None Next MD visit:   none scheduled  Date of Surgery: 2021   Insurance Primary/Secondary: BLUE CROSS MEDICAID / N/A     # Auth Visits: [id number]            Subjective:      Pain: 8/10 - no pain killers  ddi not notice any sig releif from the injection .       Objective: AROM: (* denotes performed with pain)  Hip Knee   Flexion: R nl; L nl  Extension: R nl; L nl  Abduction: R nl; L nl  ER: R nl; L nl  IR: R nl; L nl Flexion: R 105; L nl  Extension: R -9; L nl          Strength/MMT: (* denotes performed with pain)  Knee   Flexion: R 4/5; L 4/5  Extension: R 4/5; L 4/5         Gait: heel strike absent on right le stiff knee gait today, with knee sleeve in place.   Balance: SLS: R 5 sec, L 15 sec        Assessment: is progressing but has a slight increase in her pain mostly due to increased activity level which included walking on an uneven surface and extended periods of walking.  Is improving but continues to be weak in her quads, shaquille ecc control and gait changes which include decreased heel strike.  Continues to be below functional  baseline     Goals:   Goals: (to be met in 14 visits)  Pt will improve knee extension ROM to 0 deg to allow proper heel strike during gait and terminal knee extension in stance   Pt will improve knee AROM flexion to >92 degrees to improve ability to perform home , self care, transfers    Pt will improve quad strength to 5/5 to ascend 1 flight of stairs reciprocally without UE assist   Pt will increase hip and knee strength to grossly 4+/5 to be able to get up and down from the floor safely   Pt will demonstrate increased hip ER/ABD strength to 5/5 to perform stepping and squatting activities without excessive femoral IR/ADD   Pt will improve SLS to >5s to improve safety with gait on uneven surfaces such as grass and gravel  Pt will be  independent and compliant with comprehensive HEP to maintain progress achieved in P    Plan:  Patient will be seen for 2 x/week or a total of 14 visits over a 90 day period. Treatment will include: Gait training, Manual Therapy, Neuromuscular Re-education, Self-Care Home Management, Therapeutic Activities, Therapeutic Exercise, and Home Exercise Program instruction   Date: 4/18/2024  TX#: 2/14 Date:     4/25/24            TX#: 3/ Date:       5/3/2024           TX#: 4/ Date:          5/6/2024        TX#: 5/ Date: 5/10/2024    Tx#: 6/ Date: 5/13/2024   Tx#:  7 Date: 5/21/2024   Tx#:     Clamshells x 20 B   SL hip abduction x 20 B   Hooklying adductor squeeze x 20 w blue sports ball  Hooklying abduction w GTB x 20   Supine DKTC w RSB x 20   SKFO x 10 B   3 x 30 sec hamstring stretch w green strap B      NuStep x 6    SAQ x30 1.5 #   SLR x15   QS x20   Hooklying abduction w GTB x 20   TKE's x20 grn tb in // bars   Gastroc slant board x3 @ 30 sec each   Shuttle x20 31# squats   Sides step red TB x 2 laps 10 steps        NuStep x 6    SAQ x30 4 #   SLR x15 4#   QS x20   Hooklying abduction w GTB x 20   TKE's x20 grn tb in // bars   White slant board for gastroc stretch x3 @ 30 sec   Sides step grn TB x 2 laps 10 steps  Bike x6'    SAQ x30 4 #   SLR x15 4#   LAQ x20 4#   QS x20     White  balance board 2' each direction   Red tc side steps   Red TB TKEs 2x10        Bike x6'    SAQ x30 4 #   SLR x15 4#   LAQ x20 4#   Grn TB side steps x10 , x5   Grn TB Forward walk x10     Heel taps 4\" x10   Slant board x3 @1' each   Ez slide x10 hip abd/hip ext with right knee  bend  Bike x6'    SAQ x30 4 #   SLR x15 4#     Shuttle x20 with VMO 50#  Shuttle x50 # b squats x20   Single leg x20 50#    Bike x6'    Prone hang x2' 2#  Shuttle x20 with VMO 50#  Shuttle x50 # b squats x20   Single leg x20 50#   Balance board for calf stretch x3@30 sec   Balance board x2' each direction   TKE with red TB x20     STM to PF joint x3'   Tib fib mobs  to increase knee flex x3'   Tib fib mobs to increase extension    STM to PF joint x3'   Tib fib mobs to increase knee flex x3'   Tib fib mobs to increase extension  STM to PF joint x3'   Tib fib mobs to increase knee flex x3'  Tib fib mobs to increase knee flex x3'  STM to right knee PF, lateral joint line, PF mobs into med/lateral, superior inferior glide/ STM to right knee PF, lateral joint line, PF mobs into med/lateral, superior inferior glide/                     HEP: Jaelyn DENNIS hip abduction    Charges: Ther ex x2   MT 1 Total Timed Treatment: 45 min  Total Treatment Time: 45min

## 2024-06-13 DIAGNOSIS — G47.00 INSOMNIA, UNSPECIFIED TYPE: ICD-10-CM

## 2024-06-14 ENCOUNTER — HOSPITAL ENCOUNTER (OUTPATIENT)
Dept: NUCLEAR MEDICINE | Facility: HOSPITAL | Age: 59
Discharge: HOME | End: 2024-06-14
Attending: INTERNAL MEDICINE

## 2024-06-14 ENCOUNTER — HOSPITAL ENCOUNTER (OUTPATIENT)
Dept: CV DIAGNOSTICS | Facility: HOSPITAL | Age: 59
Discharge: HOME | End: 2024-06-14
Attending: INTERNAL MEDICINE

## 2024-06-14 DIAGNOSIS — R07.89 OTHER CHEST PAIN: ICD-10-CM

## 2024-06-14 DIAGNOSIS — I10 PRIMARY HYPERTENSION: ICD-10-CM

## 2024-06-14 DIAGNOSIS — E78.00 HYPERCHOLESTEREMIA: ICD-10-CM

## 2024-06-14 DIAGNOSIS — E11.9 TYPE 2 DIABETES MELLITUS WITHOUT COMPLICATION, WITHOUT LONG-TERM CURRENT USE OF INSULIN (HCC): ICD-10-CM

## 2024-06-14 PROCEDURE — 93017 CV STRESS TEST TRACING ONLY: CPT | Performed by: INTERNAL MEDICINE

## 2024-06-14 PROCEDURE — 78452 HT MUSCLE IMAGE SPECT MULT: CPT | Performed by: INTERNAL MEDICINE

## 2024-06-14 PROCEDURE — 93016 CV STRESS TEST SUPVJ ONLY: CPT | Performed by: INTERNAL MEDICINE

## 2024-06-14 PROCEDURE — 93018 CV STRESS TEST I&R ONLY: CPT | Performed by: INTERNAL MEDICINE

## 2024-06-14 RX ORDER — DOBUTAMINE HYDROCHLORIDE 200 MG/100ML
INJECTION INTRAVENOUS
Status: COMPLETED
Start: 2024-06-14 | End: 2024-06-14

## 2024-06-14 RX ADMIN — DOBUTAMINE HYDROCHLORIDE 30 MCG/KG/MIN: 200 INJECTION INTRAVENOUS at 09:06:00

## 2024-06-16 LAB
% OF MAX PREDICTED HR: 100 %
MAX DIASTOLIC BP: 77 MMHG
MAX HEART RATE: 137 BPM
MAX PREDICTED HEART RATE: 159 BPM
MAX SYSTOLIC BP: 165 MMHG
MAX WORK LOAD: 10

## 2024-06-16 RX ORDER — ZOLPIDEM TARTRATE 10 MG/1
10 TABLET ORAL NIGHTLY PRN
Qty: 90 TABLET | Refills: 0 | OUTPATIENT
Start: 2024-06-16

## 2024-06-17 ENCOUNTER — OFFICE VISIT (OUTPATIENT)
Facility: CLINIC | Age: 59
End: 2024-06-17
Payer: MEDICAID

## 2024-06-17 ENCOUNTER — OFFICE VISIT (OUTPATIENT)
Dept: PHYSICAL THERAPY | Facility: HOSPITAL | Age: 59
End: 2024-06-17
Attending: INTERNAL MEDICINE
Payer: MEDICAID

## 2024-06-17 VITALS — WEIGHT: 178 LBS | BODY MASS INDEX: 27.94 KG/M2 | HEIGHT: 67 IN

## 2024-06-17 DIAGNOSIS — M17.12 PRIMARY OSTEOARTHRITIS OF LEFT KNEE: Primary | ICD-10-CM

## 2024-06-17 PROCEDURE — 97140 MANUAL THERAPY 1/> REGIONS: CPT | Performed by: PHYSICAL THERAPIST

## 2024-06-17 PROCEDURE — 97110 THERAPEUTIC EXERCISES: CPT | Performed by: PHYSICAL THERAPIST

## 2024-06-17 RX ORDER — TRIAMCINOLONE ACETONIDE 40 MG/ML
40 INJECTION, SUSPENSION INTRA-ARTICULAR; INTRAMUSCULAR ONCE
Status: COMPLETED | OUTPATIENT
Start: 2024-06-17 | End: 2024-06-17

## 2024-06-17 RX ADMIN — TRIAMCINOLONE ACETONIDE 40 MG: 40 INJECTION, SUSPENSION INTRA-ARTICULAR; INTRAMUSCULAR at 14:42:00

## 2024-06-17 NOTE — PROCEDURES
Risks and benefits of knee injection discussed with the patient, with risks including but not limited to pain and swelling at the injection site and/or within the knee joint, infection, elevation in blood pressure and/or glucose levels, facial flushing. After informed consent, the patient's left knee was marked, locally anesthetized with skin refrigerant, prepped with topical antiseptic, and injected with a mixture of 1mL 40mg/mL Kenalog, 2mL 1% lidocaine and 2mL 0.5% marcaine through the inferolateral portal.  A band-aid was applied.  The patient tolerated the procedure well.    Laurent Patterson PA-C  [facility]

## 2024-06-17 NOTE — PROGRESS NOTES
Progress Summary  Pt has attended 8 visits in Physical Therapy.  Diagnosis:   Primary osteoarthritis of left knee (M17.12)         Referring Provider: Laurent Patterson  Date of Evaluation:    4/15/24    Precautions:  None Next MD visit:   none scheduled  Date of Surgery: 2021   Insurance Primary/Secondary: BLUE CROSS MEDICAID / N/A     # Auth Visits: 14            Subjective:  Seeing the doctor later on today , hopefully will inject my knee.  My knee pain is about the same, not really any better since the injection .     Pain: 8/10 - no pain killers  ddi not notice any sig relief from the injection .       Objective: AROM: (* denotes performed with pain)  Hip Knee   Flexion: R nl; L nl  Extension: R nl; L nl  Abduction: R nl; L nl  ER: R nl; L nl  IR: R nl; L nl Flexion: R 110; L nl  Extension: R -7; L nl          Strength/MMT: (* denotes performed with pain)  Knee   Flexion: R 4/5; L 4/5  Extension: R 4/5; L 4/5         Gait: improved effort of initiating heel strike with gait.   Balance: SLS: R 9 sec, L 15 sec  HHA bilaterally     LEFS Score  LEFS Score: 23.75 % (4/15/2024 12:28 PM)    Post LEFS Score  Post LEFS Score: 37.5 % (6/17/2024 12:07 PM)    13.75 % improvement       Assessment: Sis demonstrating improvement overall, with increased ROM of her right knee.  However continues to have tightness shaquille with extensor lag with SLR and hamstring tightness as well.  Is following up with referring MD today. Continues to bel below baseline, and would benefit from additional visits for continued CKC exercise, strengthening and functional based exercises .     Goals:   Goals: (to be met in 14 visits)  Pt will improve knee extension ROM to 0 deg to allow proper heel strike during gait and terminal knee extension in stance   Pt will improve knee AROM flexion to >92 degrees to improve ability to perform home , self care, transfers    Pt will improve quad strength to 5/5 to ascend 1 flight of stairs reciprocally without  UE assist   Pt will increase hip and knee strength to grossly 4+/5 to be able to get up and down from the floor safely   Pt will demonstrate increased hip ER/ABD strength to 5/5 to perform stepping and squatting activities without excessive femoral IR/ADD   Pt will improve SLS to >5s to improve safety with gait on uneven surfaces such as grass and gravel  Pt will be independent and compliant with comprehensive HEP to maintain progress achieved in P    Plan:  Patient will be seen for 2 x/week or a total of 14 visits over a 90 day period. Treatment will include: Gait training, Manual Therapy, Neuromuscular Re-education, Self-Care Home Management, Therapeutic Activities, Therapeutic Exercise, and Home Exercise Program instruction   Date: 4/18/2024  TX#: 2/14 Date:     4/25/24            TX#: 3/ Date:       5/3/2024           TX#: 4/ Date:          5/6/2024        TX#: 5/ Date: 5/10/2024    Tx#: 6/ Date: 5/13/2024   Tx#:  7 Date: 5/21/2024   Tx#:  8 Date: 5/30/2024   Tx#:  9 Date: 6/17/2024   Tx#:  10/   Clamshells x 20 B   SL hip abduction x 20 B   Hooklying adductor squeeze x 20 w blue sports ball  Hooklying abduction w GTB x 20   Supine DKTC w RSB x 20   SKFO x 10 B   3 x 30 sec hamstring stretch w green strap B      NuStep x 6    SAQ x30 1.5 #   SLR x15   QS x20   Hooklying abduction w GTB x 20   TKE's x20 grn tb in // bars   Gastroc slant board x3 @ 30 sec each   Shuttle x20 31# squats   Sides step red TB x 2 laps 10 steps        NuStep x 6    SAQ x30 4 #   SLR x15 4#   QS x20   Hooklying abduction w GTB x 20   TKE's x20 grn tb in // bars   White slant board for gastroc stretch x3 @ 30 sec   Sides step grn TB x 2 laps 10 steps  Bike x6'    SAQ x30 4 #   SLR x15 4#   LAQ x20 4#   QS x20     White  balance board 2' each direction   Red tc side steps   Red TB TKEs 2x10        Bike x6'    SAQ x30 4 #   SLR x15 4#   LAQ x20 4#   Grn TB side steps x10 , x5   Grn TB Forward walk x10     Heel taps 4\" x10   Slant board x3  @1' each   Ez slide x10 hip abd/hip ext with right knee  bend  Bike x6'    SAQ x30 4 #   SLR x15 4#     Shuttle x20 with VMO 50#  Shuttle x50 # b squats x20   Single leg x20 50#    Bike x6'    Prone hang x2' 2#  Shuttle x20 with VMO 50#  Shuttle x50 # b squats x20   Single leg x20 50#   Balance board for calf stretch x3@30 sec   Balance board x2' each direction   TKE with red TB x20  Bike x6'    Prone hang x2' 2#  Shuttle x20 with VMO 50#  Shuttle x50 # b squats x20   Single leg x20 50#   Balance board for calf stretch x3@30 sec   Balance board x2' each direction   TKE with red TB x20  Bike x6'  Shuttle x20 with VMO 50#  Shuttle x50 # b squats x20     Supine:  2.5#  SLR x20   SAQ x20   LAQ x20   QS x20 emphasis on extension   Instructed in sitting HS with hip hinge x3@ 30sec       STM to PF joint x3'   Tib fib mobs to increase knee flex x3'   Tib fib mobs to increase extension    STM to PF joint x3'   Tib fib mobs to increase knee flex x3'   Tib fib mobs to increase extension  STM to PF joint x3'   Tib fib mobs to increase knee flex x3'  Tib fib mobs to increase knee flex x3'  STM to right knee PF, lateral joint line, PF mobs into med/lateral, superior inferior glide/ STM to right knee PF, lateral joint line, PF mobs into med/lateral, superior inferior glide/ PROM of righ knee with emphasis  on knee extension  STM to right knee PF, lateral joint line, PF mobs into med/lateral, superior inferior glide/                          HEP: Emileels  SL hip abduction    Charges: Ther ex x2   MT 1 Total Timed Treatment: 45 min  Total Treatment Time: 45min

## 2024-06-19 ENCOUNTER — APPOINTMENT (OUTPATIENT)
Dept: PHYSICAL THERAPY | Facility: HOSPITAL | Age: 59
End: 2024-06-19
Attending: INTERNAL MEDICINE
Payer: MEDICAID

## 2024-06-20 ENCOUNTER — OFFICE VISIT (OUTPATIENT)
Dept: PHYSICAL THERAPY | Facility: HOSPITAL | Age: 59
End: 2024-06-20
Attending: INTERNAL MEDICINE
Payer: MEDICAID

## 2024-06-20 PROCEDURE — 97140 MANUAL THERAPY 1/> REGIONS: CPT | Performed by: PHYSICAL THERAPIST

## 2024-06-20 PROCEDURE — 97110 THERAPEUTIC EXERCISES: CPT | Performed by: PHYSICAL THERAPIST

## 2024-06-20 NOTE — PROGRESS NOTES
Progress Summary  Pt has attended 10 visits in Physical Therapy.  Diagnosis:   Primary osteoarthritis of left knee (M17.12)         Referring Provider: Laurent Patterson  Date of Evaluation:    4/15/24    Precautions:  None Next MD visit:   none scheduled  Date of Surgery: 2021   Insurance Primary/Secondary: BLUE CROSS MEDICAID / N/A     # Auth Visits: 14            Subjective:  Saw the doctor today and he injected my left knee with cortisone . My right knee is feeling better.     Pain: 5/10 - no pain killers  ddi not notice any sig relief from the injection .       Objective: AROM: (* denotes performed with pain)  Hip Knee   Flexion: R nl; L nl  Extension: R nl; L nl  Abduction: R nl; L nl  ER: R nl; L nl  IR: R nl; L nl Flexion: R 110; L nl  Extension: R -7; L nl          Strength/MMT: (* denotes performed with pain)  Knee   Flexion: R 4/5; L 4/5  Extension: R 4/5; L 4/5         Gait: improved effort of initiating heel strike with gait.   Balance: SLS: R 9 sec, L 15 sec  HHA bilaterally     LEFS Score  LEFS Score: 23.75 % (4/15/2024 12:28 PM)    Post LEFS Score  Post LEFS Score: 37.5 % (6/17/2024 12:07 PM)    13.75 % improvement       Assessment:Is progressing with current program   She continues to be generally weak with overall le strength and loss of Terminal extension with knee left, and shaquille with gait .  However she is continuing to build strength, endurance for both LE's, and will continue to add CKC exercise to help encourage stability of the bilateral LE's. . Continues to  below baseline, and would benefit from additional visits for continued CKC exercise, strengthening and functional based exercises .     Goals:   Goals: (to be met in 14 visits)  Pt will improve knee extension ROM to 0 deg to allow proper heel strike during gait and terminal knee extension in stance   Pt will improve knee AROM flexion to >92 degrees to improve ability to perform home , self care, transfers    Pt will improve quad  strength to 5/5 to ascend 1 flight of stairs reciprocally without UE assist   Pt will increase hip and knee strength to grossly 4+/5 to be able to get up and down from the floor safely   Pt will demonstrate increased hip ER/ABD strength to 5/5 to perform stepping and squatting activities without excessive femoral IR/ADD   Pt will improve SLS to >5s to improve safety with gait on uneven surfaces such as grass and gravel  Pt will be independent and compliant with comprehensive HEP to maintain progress achieved in P    Plan:  Patient will be seen for 2 x/week or a total of 14 visits over a 90 day period. Treatment will include: Gait training, Manual Therapy, Neuromuscular Re-education, Self-Care Home Management, Therapeutic Activities, Therapeutic Exercise, and Home Exercise Program instruction   Date: 5/13/2024   Tx#:  7 Date: 5/21/2024   Tx#:  8 Date: 5/30/2024   Tx#:  9 Date: 6/17/2024   Tx#:  10/ Date: 6/20/2024   Tx#:  11/14   Bike x6'    SAQ x30 4 #   SLR x15 4#     Shuttle x20 with VMO 50#  Shuttle x50 # b squats x20   Single leg x20 50#    Bike x6'    Prone hang x2' 2#  Shuttle x20 with VMO 50#  Shuttle x50 # b squats x20   Single leg x20 50#   Balance board for calf stretch x3@30 sec   Balance board x2' each direction   TKE with red TB x20  Bike x6'    Prone hang x2' 2#  Shuttle x20 with VMO 50#  Shuttle x50 # b squats x20   Single leg x20 50#   Balance board for calf stretch x3@30 sec   Balance board x2' each direction   TKE with red TB x20  Bike x6'  Shuttle x20 with VMO 50#  Shuttle x50 # b squats x20     Supine:  2.5#  SLR x20   SAQ x20   LAQ x20   QS x20 emphasis on extension   Instructed in sitting HS with hip hinge x3@ 30sec    Bike x6'    Supine: 3#  SLR x20 x2  SAQ x20 x2  LAQ x20 x2    Standing grntb x walks x10 x2 sets   TKEs with ball against wall x2x10 bilateral   Wall slide to ~ 30 with ball for VMO   Bosu step ups x10 each LE    STM to right knee PF, lateral joint line, PF mobs into med/lateral,  superior inferior glide/ STM to right knee PF, lateral joint line, PF mobs into med/lateral, superior inferior glide/ PROM of righ knee with emphasis  on knee extension  STM to right knee PF, lateral joint line, PF mobs into med/lateral, superior inferior glide/  Ant tib fib mobs to increase flexion of right knee multiple bouts                  HEP: Jaelyn  SL hip abduction    Charges: Ther ex x2   MT 1 Total Timed Treatment: 45 min  Total Treatment Time: 45min

## 2024-06-24 ENCOUNTER — APPOINTMENT (OUTPATIENT)
Dept: PHYSICAL THERAPY | Facility: HOSPITAL | Age: 59
End: 2024-06-24
Attending: INTERNAL MEDICINE
Payer: MEDICAID

## 2024-06-26 ENCOUNTER — APPOINTMENT (OUTPATIENT)
Dept: PHYSICAL THERAPY | Facility: HOSPITAL | Age: 59
End: 2024-06-26
Attending: INTERNAL MEDICINE
Payer: MEDICAID

## 2024-06-26 PROCEDURE — 97110 THERAPEUTIC EXERCISES: CPT

## 2024-06-26 PROCEDURE — 97140 MANUAL THERAPY 1/> REGIONS: CPT

## 2024-06-26 NOTE — PROGRESS NOTES
Diagnosis:   Primary osteoarthritis of left knee (M17.12)         Referring Provider: Laurent Patterson  Date of Evaluation:    4/15/24    Precautions:  None Next MD visit:   none scheduled  Date of Surgery: 2021   Insurance Primary/Secondary: BLUE CROSS MEDICAID / N/A     # Auth Visits: 14            Subjective:  Pt reports the knee pain is the same today. Feels like the shots did not help the pain much.     Pain: 7/10       Objective: AROM: (* denotes performed with pain)  Hip Knee   Flexion: R nl; L nl  Extension: R nl; L nl  Abduction: R nl; L nl  ER: R nl; L nl  IR: R nl; L nl Flexion: R 110; L nl  Extension: R -7; L nl          Strength/MMT: (* denotes performed with pain)  Knee   Flexion: R 4/5; L 4/5  Extension: R 4/5; L 4/5         Gait: improved effort of initiating heel strike with gait.   Balance: SLS: R 9 sec, L 15 sec  HHA bilaterally     LEFS Score  LEFS Score: 23.75 % (4/15/2024 12:28 PM)    Post LEFS Score  Post LEFS Score: 37.5 % (6/17/2024 12:07 PM)    13.75 % improvement       Assessment: Pt has good participation in therapy session with no increases in knee pain with activities today. Continued with BLE quad strengthening in WB and non WB. Pt able to complete wall slides with ball between knees to reduce knee valgus, holding for 5 deg. Pt noted to have antalgic gait on RLE and decreased TKE when walking in therapy gym. Tibiofemoral joint is stiff in end feel with mobilizations. Continue per plan of care.     Goals:   Goals: (to be met in 14 visits)  Pt will improve knee extension ROM to 0 deg to allow proper heel strike during gait and terminal knee extension in stance   Pt will improve knee AROM flexion to >92 degrees to improve ability to perform home , self care, transfers    Pt will improve quad strength to 5/5 to ascend 1 flight of stairs reciprocally without UE assist   Pt will increase hip and knee strength to grossly 4+/5 to be able to get up and down from the floor safely   Pt will  demonstrate increased hip ER/ABD strength to 5/5 to perform stepping and squatting activities without excessive femoral IR/ADD   Pt will improve SLS to >5s to improve safety with gait on uneven surfaces such as grass and gravel  Pt will be independent and compliant with comprehensive HEP to maintain progress achieved in P    Plan:  Patient will be seen for 2 x/week or a total of 14 visits over a 90 day period. Treatment will include: Gait training, Manual Therapy, Neuromuscular Re-education, Self-Care Home Management, Therapeutic Activities, Therapeutic Exercise, and Home Exercise Program instruction   Date: 5/13/2024   Tx#:  7 Date: 5/21/2024   Tx#:  8 Date: 5/30/2024   Tx#:  9 Date: 6/17/2024   Tx#:  10/ Date: 6/20/2024   Tx#:  11/14 Date: 6/26/2024  Tx#:12/14   Bike x6'    SAQ x30 4 #   SLR x15 4#     Shuttle x20 with VMO 50#  Shuttle x50 # b squats x20   Single leg x20 50#    Bike x6'    Prone hang x2' 2#  Shuttle x20 with VMO 50#  Shuttle x50 # b squats x20   Single leg x20 50#   Balance board for calf stretch x3@30 sec   Balance board x2' each direction   TKE with red TB x20  Bike x6'    Prone hang x2' 2#  Shuttle x20 with VMO 50#  Shuttle x50 # b squats x20   Single leg x20 50#   Balance board for calf stretch x3@30 sec   Balance board x2' each direction   TKE with red TB x20  Bike x6'  Shuttle x20 with VMO 50#  Shuttle x50 # b squats x20     Supine:  2.5#  SLR x20   SAQ x20   LAQ x20   QS x20 emphasis on extension   Instructed in sitting HS with hip hinge x3@ 30sec    Bike x6'    Supine: 3#  SLR x20 x2  SAQ x20 x2  LAQ x20 x2    Standing grntb x walks x10 x2 sets   TKEs with ball against wall x2x10 bilateral   Wall slide to ~ 30 with ball for VMO   Bosu step ups x10 each LE  Therex: 30 min  Upright bike 6 min  Supine: 3#  SLR x20 x2  SAQ x20 x2  TKEs with ball against wall x2x10 bilateral   Wall slide to ~ 30 with ball for VMO, x10 5 sec hold  Standing grntb x walks x10 steps x2 sets   Side stepping with  green band, 2x10 steps     STM to right knee PF, lateral joint line, PF mobs into med/lateral, superior inferior glide/ STM to right knee PF, lateral joint line, PF mobs into med/lateral, superior inferior glide/ PROM of righ knee with emphasis  on knee extension  STM to right knee PF, lateral joint line, PF mobs into med/lateral, superior inferior glide/  Ant tib fib mobs to increase flexion of right knee multiple bouts  Manual: 10 min  A/P tibiofemoral glides, 2x20  STM to R medial knee                   HEP: Jaelyn DENNIS hip abduction    Charges: Ther ex x2   MT 1 Total Timed Treatment: 40 min  Total Treatment Time: 40 min

## 2024-07-01 ENCOUNTER — APPOINTMENT (OUTPATIENT)
Dept: PHYSICAL THERAPY | Facility: HOSPITAL | Age: 59
End: 2024-07-01
Attending: INTERNAL MEDICINE
Payer: MEDICAID

## 2024-07-01 ENCOUNTER — TELEPHONE (OUTPATIENT)
Dept: PHYSICAL THERAPY | Facility: HOSPITAL | Age: 59
End: 2024-07-01

## 2024-07-05 DIAGNOSIS — E11.9 TYPE 2 DIABETES MELLITUS WITHOUT COMPLICATION, WITHOUT LONG-TERM CURRENT USE OF INSULIN (HCC): ICD-10-CM

## 2024-07-05 DIAGNOSIS — E78.2 MIXED HYPERLIPIDEMIA: ICD-10-CM

## 2024-07-10 RX ORDER — ATORVASTATIN CALCIUM 40 MG/1
40 TABLET, FILM COATED ORAL NIGHTLY
Qty: 90 TABLET | Refills: 3 | Status: SHIPPED | OUTPATIENT
Start: 2024-07-10

## 2024-07-10 NOTE — TELEPHONE ENCOUNTER
Refill passed per [facility] protocol.  Requested Prescriptions   Pending Prescriptions Disp Refills    ATORVASTATIN 40 MG Oral Tab [Pharmacy Med Name: ATORVASTATIN 40MG TABLETS] 90 tablet 0     Sig: TAKE 1 TABLET(40 MG) BY MOUTH EVERY NIGHT       Cholesterol Medication Protocol Passed - 7/5/2024  2:21 PM        Passed - ALT < 80     Lab Results   Component Value Date    ALT 21 09/27/2023             Passed - ALT resulted within past year        Passed - Lipid panel within past 12 months     Lab Results   Component Value Date    CHOLEST 146 03/18/2024    TRIG 127 03/18/2024    HDL 51 03/18/2024    LDL 73 03/18/2024    VLDL 19 03/18/2024    NONHDLC 95 03/18/2024             Passed - In person appointment or virtual visit in the past 12 mos or appointment in next 3 mos     Recent Outpatient Visits              2 weeks ago     [facility] Gloria Guerrero    Office Visit    2 weeks ago     [facility] Lolis Szymanski, PT    Office Visit    3 weeks ago Primary osteoarthritis of left knee    [facility] [address] [facility] Laurent Patterson PA-C    Office Visit    3 weeks ago     [facility] Lolis Szymanski, PT    Office Visit    1 month ago     [facility] Lolis Szymanski, PT    Office Visit          Future Appointments         Provider Department Appt Notes    In 1 week Ashely Major PA [facility], [address] [place] [address] left ring finger pain    In 1 week Lolis Szymanski, PT [facility] 14 visits approved  4/15/24 to 7/13/24  [id number]  [employer]    In 3 weeks Mariaelena Valle DO [facility], [address] [place] [address] DM f/u and anxiety f/u    In 1 month Gavino López MD [facility]                       Recent Outpatient Visits              2 weeks ago     [facility]  Gloria Guerrero    Office Visit    2 weeks ago     [facility] Lolis Szymanski, PT    Office Visit    3 weeks ago Primary osteoarthritis of left knee    [facility], [address] [place] [address] Laurent Patterson PA-C    Office Visit    3 weeks ago     [facility] Lolis Szymasnki, PT    Office Visit    1 month ago     [facility] Lolis Szymanski, PT    Office Visit          Future Appointments         Provider Department Appt Notes    In 1 week Ashely Major PA [facility], [address] [place] [address] left ring finger pain    In 1 week Lolis Szymanski, PT [facility] Therapy 14 visits approved  4/15/24 to 7/13/24  [id number]  [employer] F[employer]    In 3 weeks Mariaelena Valle DO [facility], [address] [place] [address] DM f/u and anxiety f/u    In 1 month Gavino López MD [facility]

## 2024-07-16 ENCOUNTER — TELEPHONE (OUTPATIENT)
Dept: ORTHOPEDICS CLINIC | Facility: CLINIC | Age: 59
End: 2024-07-16

## 2024-07-16 DIAGNOSIS — M79.645 FINGER PAIN, LEFT: Primary | ICD-10-CM

## 2024-07-23 ENCOUNTER — OFFICE VISIT (OUTPATIENT)
Dept: PHYSICAL THERAPY | Facility: HOSPITAL | Age: 59
End: 2024-07-23
Attending: INTERNAL MEDICINE
Payer: MEDICAID

## 2024-07-23 PROCEDURE — 97110 THERAPEUTIC EXERCISES: CPT | Performed by: PHYSICAL THERAPIST

## 2024-07-23 PROCEDURE — 97140 MANUAL THERAPY 1/> REGIONS: CPT | Performed by: PHYSICAL THERAPIST

## 2024-07-23 NOTE — PROGRESS NOTES
Diagnosis:   Primary osteoarthritis of left knee (M17.12)         Referring Provider: Laurent Patterson  Date of Evaluation:    4/15/24    Precautions:  None Next MD visit:   none scheduled  Date of Surgery: 2021   Insurance Primary/Secondary: BLUE CROSS MEDICAID / N/A     # Auth Visits: 14            Subjective:  Has been trying to be as active as she could.  Used a wheelchair, but [place] recently went out of town for a family reunion.   Have been doing my exercise.      Pain: 6/10   Pain described as sharp, notes the pain to be weather related.   Keeping up with her exercise.   Bedroom is upstairs  so she has to go up and down the stairs a lot during the day .   Knee hasn't buckled at all.  Get shots left knee, helped.         Objective: AROM: (* denotes performed with pain)  Hip Knee   Flexion: R nl; L nl  Extension: R nl; L nl  Abduction: R nl; L nl  ER: R nl; L nl  IR: R nl; L nl Flexion: R 112; L nl  Extension: R -7; L nl          Strength/MMT: (* denotes performed with pain)  Knee   Flexion: R 4/5; L 4/5  Extension: R 4/5; L 4/5         Gait: improved effort of initiating heel strike with gait.   Balance: SLS: R 12sec, L 19 sec  HHA bilaterally     LEFS Score  LEFS Score: 23.75 % (4/15/2024 12:28 PM)    Post LEFS Score  Post LEFS Score: 37.5 % (6/17/2024 12:07 PM)    13.75 % improvement       Assessment:  is continuing to improve overall, but CKC strength, shaquille ecc control continues to be weak with strength for long endurance, stairs.  She continues with her HEP and encouraged her to progress shaquille with CKC based exercises.  She continues to be weak shaquille of the right >left LE, and would benefit from additional PT to focus on strength, shaquille CKC strength.     Goals:   Goals: (to be met in 14 visits)  Pt will improve knee extension ROM to 0 deg to allow proper heel strike during gait and terminal knee extension in stance PROGRESSING    Pt will improve knee AROM flexion to  >92 degrees to improve ability to perform home , self care, transfers  PROGRESSING    Pt will improve quad strength to 5/5 to ascend 1 flight of stairs reciprocally without UE assist   Pt will increase hip and knee strength to grossly 4+/5 to be able to get up and down from the floor safely PROGRESSING    Pt will demonstrate increased hip ER/ABD strength to 5/5 to perform stepping and squatting activities without excessive femoral IR/ADD PROGRESSING    Pt will improve SLS to >5s to improve safety with gait on uneven surfaces such as grass and gravel PROGRESSING    Pt will be independent and compliant with comprehensive HEP to maintain progress achieved in PT  PROGRESSING      Plan:  Patient will be seen for 2 x/week or a total of 10visits over a 90 day period. Treatment will include: Gait training, Manual Therapy, Neuromuscular Re-education, Self-Care Home Management, Therapeutic Activities, Therapeutic Exercise, and Home Exercise Program instruction   Date: 5/13/2024   Tx#:  7 Date: 5/21/2024   Tx#:  8 Date: 5/30/2024   Tx#:  9 Date: 6/17/2024   Tx#:  10/ Date: 6/20/2024   Tx#:  11/14 Date: 6/26/2024  Tx#:12/14 Date: 7/23/2024   Tx#:     Bike x6'    SAQ x30 4 #   SLR x15 4#     Shuttle x20 with VMO 50#  Shuttle x50 # b squats x20   Single leg x20 50#    Bike x6'    Prone hang x2' 2#  Shuttle x20 with VMO 50#  Shuttle x50 # b squats x20   Single leg x20 50#   Balance board for calf stretch x3@30 sec   Balance board x2' each direction   TKE with red TB x20  Bike x6'    Prone hang x2' 2#  Shuttle x20 with VMO 50#  Shuttle x50 # b squats x20   Single leg x20 50#   Balance board for calf stretch x3@30 sec   Balance board x2' each direction   TKE with red TB x20  Bike x6'  Shuttle x20 with VMO 50#  Shuttle x50 # b squats x20     Supine:  2.5#  SLR x20   SAQ x20   LAQ x20   QS x20 emphasis on extension   Instructed in sitting HS with hip hinge x3@ 30sec    Bike x6'    Supine: 3#  SLR x20 x2  SAQ x20 x2  LAQ x20  x2    Standing grntb x walks x10 x2 sets   TKEs with ball against wall x2x10 bilateral   Wall slide to ~ 30 with ball for VMO   Bosu step ups x10 each LE  Therex: 30 min  Upright bike 6 min  Supine: 3#  SLR x20 x2  SAQ x20 x2  TKEs with ball against wall x2x10 bilateral   Wall slide to ~ 30 with ball for VMO, x10 5 sec hold  Standing grntb x walks x10 steps x2 sets   Side stepping with green band, 2x10 steps   TKEs with ball against wall x2x10 bilateral   Side stepping with green band, 2x10 steps  Step up over down x10 6\"  Shuttle 61# x30 b VMO   X20 single leg   Standing grntb x walks x10 steps x2 sets   Side stepping with green band, 2x10 steps  Bike x5'      STM to right knee PF, lateral joint line, PF mobs into med/lateral, superior inferior glide/ STM to right knee PF, lateral joint line, PF mobs into med/lateral, superior inferior glide/ PROM of right knee with emphasis  on knee extension  STM to right knee PF, lateral joint line, PF mobs into med/lateral, superior inferior glide/  Ant tib fib mobs to increase flexion of right knee multiple bouts  Manual: 10 min  A/P tibiofemoral glides, 2x20  STM to R medial knee Manual: 10 min  A/P tibiofemoral glides, 2x20  STM to R medial knee                     HEP: Jaelyn DENNIS hip abduction    Charges: Ther ex x2   MT 1 Total Timed Treatment: 40 min  Total Treatment Time: 40 min

## 2024-08-14 DIAGNOSIS — G47.00 INSOMNIA, UNSPECIFIED TYPE: ICD-10-CM

## 2024-08-16 ENCOUNTER — OFFICE VISIT (OUTPATIENT)
Dept: PHYSICAL THERAPY | Facility: HOSPITAL | Age: 59
End: 2024-08-16
Attending: PHYSICIAN ASSISTANT
Payer: MEDICAID

## 2024-08-16 PROCEDURE — 97110 THERAPEUTIC EXERCISES: CPT | Performed by: PHYSICAL THERAPIST

## 2024-08-16 NOTE — PROGRESS NOTES
Diagnosis:   Primary osteoarthritis of left knee (M17.12)         Referring Provider: Laurent Patterson  Date of Evaluation:    4/15/24    Precautions:  None Next MD visit:   none scheduled  Date of Surgery: 2021   Insurance Primary/Secondary: BLUE CROSS MEDICAID / N/A     # Auth Visits: [id number]            Subjective:  Has not been as good with the exercises as she should be.  Have been trying to walk a little bit more but it is hard to .  Has been wearing ht e knee sleeve.      Pain: 6/10   Pain described as sharp, notes the pain to be weather related.         Objective: AROM: (* denotes performed with pain)  Hip Knee   Flexion: R nl; L nl  Extension: R nl; L nl  Abduction: R nl; L nl  ER: R nl; L nl  IR: R nl; L nl Flexion: R 112; L nl  Extension: R -7; L nl          Strength/MMT: (* denotes performed with pain)  Knee   Flexion: R 4/5; L 4/5  Extension: R 4/5; L 4/5         Gait: improved effort of initiating heel strike with gait.   Balance: SLS: R 12sec, L 19 sec  HHA bilaterally     LEFS Score  LEFS Score: 23.75 % (4/15/2024 12:28 PM)    Post LEFS Score  Post LEFS Score: 51.25 % (7/29/2024 11:49 AM)    27.5 % improvement       Assessment:  Has not been able to attend PT due to being OOT .  States that she hasn't been very consistent with her HEP and stretching program.     Goals:   Goals: (to be met in 14 visits)  Pt will improve knee extension ROM to 0 deg to allow proper heel strike during gait and terminal knee extension in stance PROGRESSING    Pt will improve knee AROM flexion to >92 degrees to improve ability to perform home , self care, transfers  PROGRESSING    Pt will improve quad strength to 5/5 to ascend 1 flight of stairs reciprocally without UE assist   Pt will increase hip and knee strength to grossly 4+/5 to be able to get up and down from the floor safely PROGRESSING    Pt will demonstrate increased hip ER/ABD strength to 5/5 to perform stepping and squatting activities without excessive femoral  IR/ADD PROGRESSING    Pt will improve SLS to >5s to improve safety with gait on uneven surfaces such as grass and gravel PROGRESSING    Pt will be independent and compliant with comprehensive HEP to maintain progress achieved in PT  PROGRESSING      Plan:  Patient will be seen for 2 x/week or a total of 10visits over a 90 day period. Treatment will include: Gait training, Manual Therapy, Neuromuscular Re-education, Self-Care Home Management, Therapeutic Activities, Therapeutic Exercise, and Home Exercise Program instruction   Date: 5/13/2024   Tx#:  7 Date: 5/21/2024   Tx#:  8 Date: 5/30/2024   Tx#:  9 Date: 6/17/2024   Tx#:  10/ Date: 6/20/2024   Tx#:  11/14 Date: 6/26/2024  Tx#:12/14 Date: 7/23/2024   Tx#:  13/14 Date: 8/16/2024   Tx#: 14/14      Bike x6'    SAQ x30 4 #   SLR x15 4#     Shuttle x20 with VMO 50#  Shuttle x50 # b squats x20   Single leg x20 50#    Bike x6'    Prone hang x2' 2#  Shuttle x20 with VMO 50#  Shuttle x50 # b squats x20   Single leg x20 50#   Balance board for calf stretch x3@30 sec   Balance board x2' each direction   TKE with red TB x20  Bike x6'    Prone hang x2' 2#  Shuttle x20 with VMO 50#  Shuttle x50 # b squats x20   Single leg x20 50#   Balance board for calf stretch x3@30 sec   Balance board x2' each direction   TKE with red TB x20  Bike x6'  Shuttle x20 with VMO 50#  Shuttle x50 # b squats x20     Supine:  2.5#  SLR x20   SAQ x20   LAQ x20   QS x20 emphasis on extension   Instructed in sitting HS with hip hinge x3@ 30sec    Bike x6'    Supine: 3#  SLR x20 x2  SAQ x20 x2  LAQ x20 x2    Standing grntb x walks x10 x2 sets   TKEs with ball against wall x2x10 bilateral   Wall slide to ~ 30 with ball for VMO   Bosu step ups x10 each LE  Therex: 30 min  Upright bike 6 min  Supine: 3#  SLR x20 x2  SAQ x20 x2  TKEs with ball against wall x2x10 bilateral   Wall slide to ~ 30 with ball for VMO, x10 5 sec hold  Standing grntb x walks x10 steps x2 sets   Side stepping with green band, 2x10  steps   TKEs with ball against wall x2x10 bilateral   Side stepping with green band, 2x10 steps  Step up over down x10 6\"  Shuttle 61# x30 b VMO   X20 single leg   Standing grntb x walks x10 steps x2 sets   Side stepping with green band, 2x10 steps  Bike x5'    TKEs with ball against wall x2x10 bilateral   Side stepping with green band, 2x10 steps  Bosu step ups x10 each   Bosu lunges x10 ea   Shuttle 61# x30 b VMO   X20 single leg   Heel r  Standing grntb x walks x10 steps x2 sets   Side stepping with green band, 2x10 steps  Bike x5'   TKE with ball at wall 2x10     STM to right knee PF, lateral joint line, PF mobs into med/lateral, superior inferior glide/ STM to right knee PF, lateral joint line, PF mobs into med/lateral, superior inferior glide/ PROM of right knee with emphasis  on knee extension  STM to right knee PF, lateral joint line, PF mobs into med/lateral, superior inferior glide/  Ant tib fib mobs to increase flexion of right knee multiple bouts  Manual: 10 min  A/P tibiofemoral glides, 2x20  STM to R medial knee Manual: 10 min  A/P tibiofemoral glides, 2x20  STM to R medial knee                           HEP: Jaelyn DENNIS hip abduction  Charges: Ther ex x3   Total Timed Treatment: 40 min  Total Treatment Time: 40 min

## 2024-08-19 RX ORDER — AMLODIPINE BESYLATE 5 MG/1
5 TABLET ORAL DAILY
Qty: 90 TABLET | Refills: 3 | Status: SHIPPED | OUTPATIENT
Start: 2024-08-19

## 2024-08-19 RX ORDER — ZOLPIDEM TARTRATE 10 MG/1
10 TABLET ORAL NIGHTLY PRN
Qty: 30 TABLET | Refills: 0 | Status: SHIPPED | OUTPATIENT
Start: 2024-08-19 | End: 2024-10-07

## 2024-08-19 NOTE — TELEPHONE ENCOUNTER
Please review. Protocol Failed; No Protocol      Recent fills: 4/17/2024, 5/14/2024, 6/14/2024  Last Rx written: 6/14/2024  Last office visit: 5/1/2024      Future Appointments  Date Type Provider Dept   09/13/24 Appointment Mariaelena Valle DO [facility] [address]   Showing future appointments within next 150 days with a meds authorizing provider and meeting all other requirements      Requested Prescriptions   Pending Prescriptions Disp Refills    ZOLPIDEM 10 MG Oral Tab [Pharmacy Med Name: ZOLPIDEM 10MG TABLETS] 30 tablet 0     Sig: TAKE 1 TABLET(10 MG) BY MOUTH EVERY NIGHT AS NEEDED FOR SLEEP       Controlled Substance Medication Failed - 8/14/2024  9:17 PM        Failed - This medication is a controlled substance - forward to provider to refill         Signed Prescriptions Disp Refills    amLODIPine 5 MG Oral Tab 90 tablet 3     Sig: TAKE 1 TABLET(5 MG) BY MOUTH DAILY       Hypertension Medications Protocol Passed - 8/14/2024  9:17 PM        Passed - CMP or BMP in past 12 months        Passed - Last BP reading less than 140/90     BP Readings from Last 1 Encounters:   05/29/24 128/74               Passed - In person appointment or virtual visit in the past 12 mos or appointment in next 3 mos     Recent Outpatient Visits              3 days ago     [facility] Lolis Szymanski, PT    Office Visit    3 weeks ago     [facility] Lolis Szymanski, PT    Office Visit    1 month ago     [facility] Gloria Guerrero    Office Visit    2 months ago     [facility] Lolis Szymanski, PT    Office Visit    2 months ago Primary osteoarthritis of left knee    [facility], [address] [place] [address] Laurent Patterson PA-C    Office Visit          Future Appointments         Provider Department Appt Notes    Tomorrow Gavino López MD [facility], [address], [place] throat    Tomorrow Lolis Szymanski,  PT [facility] 4v 7/23 to 9/21  [id number]  [employer]    In 4 days Ashely Major PA [address] RING FINGER PAIN; XR FIRST    In 4 days Lolis Szymanski, PT [facility] 4v 7/23 to 9/21  [id number]  [employer]    In 3 weeks Mariaelena Valle, [address] DM f/u and anxiety f/u                    Passed - EGFRCR or GFRAA > 50     GFR Evaluation  EGFRCR: 94 , resulted on 3/22/2024                 Future Appointments         Provider Department Appt Notes    Tomorrow Gavino López MD [facility], [facility], [place] throat    Tomorrow Lolis Szymanski, PT [facility] 4v 7/23 to 9/21  [id number]  [employer]    In 4 days Ashely Major PA [address] LT RING FINGER PAIN; XR FIRST    In 4 days Lolis Szymanski, PT [facility] 4v 7/23 to 9/21  [id number]  [employer]    In 3 weeks Mariaelena Valle, [address] DM f/u and anxiety f/u          Recent Outpatient Visits              3 days ago     [facility] Lolis Szymanski, PT    Office Visit    3 weeks ago     [facility] Lolis Szymanski, PT    Office Visit    1 month ago     [facility] Gloria Guerrero    Office Visit    2 months ago     [facility] Lolis Szymanski, PT    Office Visit    2 months ago Primary osteoarthritis of left knee    [facility], [address] [place] [address] Laurent Patterson PA-C    Office Visit

## 2024-08-20 ENCOUNTER — OFFICE VISIT (OUTPATIENT)
Dept: PHYSICAL THERAPY | Facility: HOSPITAL | Age: 59
End: 2024-08-20
Attending: INTERNAL MEDICINE
Payer: MEDICAID

## 2024-08-20 PROCEDURE — 97110 THERAPEUTIC EXERCISES: CPT | Performed by: PHYSICAL THERAPIST

## 2024-08-20 NOTE — PROGRESS NOTES
Diagnosis:   Primary osteoarthritis of left knee (M17.12)         Referring Provider: Laurent Patterson  Date of Evaluation:    4/15/24    Precautions:  None Next MD visit:   none scheduled  Date of Surgery: 2021   Insurance Primary/Secondary: BLUE CROSS MEDICAID / N/A     # Auth Visits: 14            Subjective:  eel stiff and sore today .  Had a hard time putting on my shoes too. Easier to wear slip ons     Pain: 6/10   Pain described as sharp, notes the pain to be weather related.         Objective: AROM: (* denotes performed with pain)  Hip Knee   Flexion: R nl; L nl  Extension: R nl; L nl  Abduction: R nl; L nl  ER: R nl; L nl  IR: R nl; L nl Flexion: R 112; L nl  Extension: R -7; L nl          Strength/MMT: (* denotes performed with pain)  Knee   Flexion: R 4/5; L 4/5  Extension: R 4-/5; L 4/5         Gait: improved effort of initiating heel strike with gait.   Balance: SLS: R 12sec, L 19 sec  HHA bilaterally     LEFS Score  LEFS Score: 23.75 % (4/15/2024 12:28 PM)    Post LEFS Score  Post LEFS Score: 51.25 % (7/29/2024 11:49 AM)    27.5 % improvement       Assessment:  Is progressing well with current treatment plan but continues to be weak, functional stability of the knee is weak . Needs continued emphasis on HEP and functional strength for hip>knee>ankle .      Goals:   Goals: (to be met in 14 visits)  Pt will improve knee extension ROM to 0 deg to allow proper heel strike during gait and terminal knee extension in stance PROGRESSING    Pt will improve knee AROM flexion to >92 degrees to improve ability to perform home , self care, transfers  PROGRESSING    Pt will improve quad strength to 5/5 to ascend 1 flight of stairs reciprocally without UE assist   Pt will increase hip and knee strength to grossly 4+/5 to be able to get up and down from the floor safely PROGRESSING    Pt will demonstrate increased hip ER/ABD strength to 5/5 to perform stepping and squatting activities without excessive femoral  IR/ADD PROGRESSING    Pt will improve SLS to >5s to improve safety with gait on uneven surfaces such as grass and gravel PROGRESSING    Pt will be independent and compliant with comprehensive HEP to maintain progress achieved in PT  PROGRESSING      Plan:  Patient will be seen for 2 x/week or a total of 10visits over a 90 day period. Treatment will include: Gait training, Manual Therapy, Neuromuscular Re-education, Self-Care Home Management, Therapeutic Activities, Therapeutic Exercise, and Home Exercise Program instruction   Date: 5/13/2024   Tx#:  7 Date: 5/21/2024   Tx#:  8 Date: 5/30/2024   Tx#:  9 Date: 6/17/2024   Tx#:  10/ Date: 6/20/2024   Tx#:  11/14 Date: 6/26/2024  Tx#:12/14 Date: 7/23/2024   Tx#:  13/14 Date: 8/16/2024   Tx#: 14/14   Date: 8/20/2024   Tx#:     Bike x6'    SAQ x30 4 #   SLR x15 4#     Shuttle x20 with VMO 50#  Shuttle x50 # b squats x20   Single leg x20 50#    Bike x6'    Prone hang x2' 2#  Shuttle x20 with VMO 50#  Shuttle x50 # b squats x20   Single leg x20 50#   Balance board for calf stretch x3@30 sec   Balance board x2' each direction   TKE with red TB x20  Bike x6'    Prone hang x2' 2#  Shuttle x20 with VMO 50#  Shuttle x50 # b squats x20   Single leg x20 50#   Balance board for calf stretch x3@30 sec   Balance board x2' each direction   TKE with red TB x20  Bike x6'  Shuttle x20 with VMO 50#  Shuttle x50 # b squats x20     Supine:  2.5#  SLR x20   SAQ x20   LAQ x20   QS x20 emphasis on extension   Instructed in sitting HS with hip hinge x3@ 30sec    Bike x6'    Supine: 3#  SLR x20 x2  SAQ x20 x2  LAQ x20 x2    Standing grntb x walks x10 x2 sets   TKEs with ball against wall x2x10 bilateral   Wall slide to ~ 30 with ball for VMO   Bosu step ups x10 each LE  Therex: 30 min  Upright bike 6 min  Supine: 3#  SLR x20 x2  SAQ x20 x2  TKEs with ball against wall x2x10 bilateral   Wall slide to ~ 30 with ball for VMO, x10 5 sec hold  Standing grntb x walks x10 steps x2 sets   Side stepping  with green band, 2x10 steps   TKEs with ball against wall x2x10 bilateral   Side stepping with green band, 2x10 steps  Step up over down x10 6\"  Shuttle 61# x30 b VMO   X20 single leg   Standing grntb x walks x10 steps x2 sets   Side stepping with green band, 2x10 steps  Bike x5'    TKEs with ball against wall x2x10 bilateral   Side stepping with green band, 2x10 steps  Bosu step ups x10 each   Bosu lunges x10 ea   Shuttle 61# x30 b VMO   X20 single leg   Heel r  Standing grntb x walks x10 steps x2 sets   Side stepping with green band, 2x10 steps  Bike x5'   TKE with ball at wall 2x10  Bike x5' res 4     TKEs with ball against wall x2x10 bilateral     Bosu step ups x10   Bosu lunges x10   Side step x5 red TC   BW red T Cx10     Shuttle 61# x30 b VMO   X20 single leg   Prone:   HS curls x15 5#   HS curls x10 without weight   Sitting:  Knee flexion 2x10 5#  Heel raises on airex x10   Mini knee bends on airex x10    STM to right knee PF, lateral joint line, PF mobs into med/lateral, superior inferior glide/ STM to right knee PF, lateral joint line, PF mobs into med/lateral, superior inferior glide/ PROM of right knee with emphasis  on knee extension  STM to right knee PF, lateral joint line, PF mobs into med/lateral, superior inferior glide/  Ant tib fib mobs to increase flexion of right knee multiple bouts  Manual: 10 min  A/P tibiofemoral glides, 2x20  STM to R medial knee Manual: 10 min  A/P tibiofemoral glides, 2x20  STM to R medial knee                           HEP: Jaelyn    Charges: Ther ex x3   Total Timed Treatment: 40 min  Total Treatment Time: 40 min

## 2024-08-23 ENCOUNTER — APPOINTMENT (OUTPATIENT)
Dept: PHYSICAL THERAPY | Facility: HOSPITAL | Age: 59
End: 2024-08-23
Attending: INTERNAL MEDICINE
Payer: MEDICAID

## 2024-08-23 ENCOUNTER — HOSPITAL ENCOUNTER (OUTPATIENT)
Dept: GENERAL RADIOLOGY | Age: 59
Discharge: HOME | End: 2024-08-23
Attending: PHYSICIAN ASSISTANT
Payer: MEDICAID

## 2024-08-23 ENCOUNTER — OFFICE VISIT (OUTPATIENT)
Dept: ORTHOPEDICS CLINIC | Facility: CLINIC | Age: 59
End: 2024-08-23
Payer: MEDICAID

## 2024-08-23 VITALS — BODY MASS INDEX: 27.94 KG/M2 | WEIGHT: 178 LBS | HEIGHT: 67 IN

## 2024-08-23 DIAGNOSIS — M79.645 FINGER PAIN, LEFT: ICD-10-CM

## 2024-08-23 DIAGNOSIS — M67.449 MUCOUS CYST OF FINGER: Primary | ICD-10-CM

## 2024-08-23 PROCEDURE — 73140 X-RAY EXAM OF FINGER(S): CPT | Performed by: PHYSICIAN ASSISTANT

## 2024-08-23 NOTE — H&P
Clinic Note [facility]     Assessment/Plan:  59 year old female    Left ring finger mucous cyst-we discussed conservative management continuing to live with it versus surgical intervention.  She would like to have it removed.  I will refer her to Dr. Mayer for that.  She will see him and discuss it further but we did discuss pros and cons and risk benefits as well as recovery time and expected outcomes.  All of her questions were answered.    No diagnosis found.     Follow Up: With Dr. Mayer for surgical discussion    Diagnostic Studies:  X-rays are negative for acute fractures occasions or deformities    Physical Exam:    Ht 5' 7\" (1.702 m)   Wt 178 lb (80.7 kg)   BMI 27.88 kg/m²     Constitutional: NAD. AOx3. Well-developed and Well-nourished.   Psychiatric: Normal mood/ affect/ behavior. Judgment and thought content normal.     Left upper Extremity:   Inspection: Skin Intact. No skin lesions. No gross deformity.   Palpation: Mildly tender along the mucous cyst on the ulnar aspect of the DIP joint   Motion: Elbow: normal bilateral symmetric ext/flex  Wrist: normal bilateral symmetric ext/flex/sup/pro  Finger: full composite fist       CC: Hand Pain (LT RING FINGER BUMP; OVER A YEAR; NO INJURY )        HPI: This 59 year old female presents with complaints of a mass to her ring finger.  It has been there over a year no injury.  Does cause her some discomfort.  Sharp shooting pain can be sometimes severe.    @[email or web address]@      Past Medical History:    Diabetes (HCC)    Diverticulosis of large intestine    Esophageal reflux    Essential hypertension    High blood pressure    High cholesterol    Osteoarthritis    Pneumonia due to organism    Stroke (HCC)    2008    Visual impairment    READING GLASSES       Past Surgical History:   Procedure Laterality Date    Colonoscopy N/A 9/7/2023    Procedure: COLONOSCOPY;  Surgeon: Evelyn Coles MD;  Location:  ENDOSCOPY    Exploratory of abdomen       Knee replacement surgery         Current Outpatient Medications   Medication Sig Dispense Refill    amLODIPine 5 MG Oral Tab TAKE 1 TABLET(5 MG) BY MOUTH DAILY 90 tablet 3    zolpidem 10 MG Oral Tab Take 1 tablet (10 mg total) by mouth nightly as needed. 30 tablet 0    atorvastatin 40 MG Oral Tab Take 1 tablet (40 mg total) by mouth nightly. 90 tablet 3    gabapentin 100 MG Oral Cap Take 1 capsule (100 mg total) by mouth 3 (three) times daily. 90 capsule 0    HYDROcodone-acetaminophen (NORCO) 5-325 MG Oral Tab Take 1 tablet by mouth every 6 (six) hours as needed for Pain. 15 tablet 0    ergocalciferol 1.25 MG (76451 UT) Oral Cap Take 1 capsule (50,000 Units total) by mouth once a week. 6 capsule 0    metFORMIN  MG Oral Tablet 24 Hr Take 1 tablet (500 mg total) by mouth daily. 90 tablet 3    docusate sodium 100 MG Oral Cap Take 1 capsule (100 mg total) by mouth 2 (two) times daily. 60 capsule 2    Triamterene-HCTZ 37.5-25 MG Oral Tab Take 1 tablet by mouth daily. 90 tablet 0    albuterol 108 (90 Base) MCG/ACT Inhalation Aero Soln Inhale 2 puffs into the lungs 4 (four) times daily as needed. 1 each 0       Allergies   Allergen Reactions    Seasonal OTHER (SEE COMMENTS)     Watery eyes, sneezing        Family History   Problem Relation Age of Onset    Heart Disorder Father     Cancer Mother     Hypertension Mother        Social History     Occupational History    Not on file   Tobacco Use    Smoking status: Every Day     Current packs/day: 0.10     Types: Cigarettes    Smokeless tobacco: Never   Vaping Use    Vaping status: Never Used   Substance and Sexual Activity    Alcohol use: Yes     Comment: occasional    Drug use: Never    Sexual activity: Not on file        Review of Systems (negative unless bolded):  General: fevers, chills, fatigue  CV:  chest pain, palpitations, leg swelling  Msk: bodyaches, neck pain, neck stiffness  Skin: rashes, open wounds, nonhealing ulcers  Hem: bleeds easily, bruise easily,  immunocompromised  Neuro: dizziness, light headedness, headaches  Psych: anxious, depressed, anger issues  Ashely Major PA-C  Hand, Wrist, & Elbow Surgery  Physician Assistant to Dr. [email or web address]  t: [phone]  f: [phone]

## 2024-09-09 ENCOUNTER — TELEPHONE (OUTPATIENT)
Dept: INTERNAL MEDICINE CLINIC | Facility: CLINIC | Age: 59
End: 2024-09-09

## 2024-09-12 ENCOUNTER — TELEPHONE (OUTPATIENT)
Dept: PHYSICAL THERAPY | Facility: HOSPITAL | Age: 59
End: 2024-09-12

## 2024-09-13 ENCOUNTER — TELEPHONE (OUTPATIENT)
Dept: ORTHOPEDICS CLINIC | Facility: CLINIC | Age: 59
End: 2024-09-13

## 2024-09-16 ENCOUNTER — OFFICE VISIT (OUTPATIENT)
Facility: CLINIC | Age: 59
End: 2024-09-16
Payer: MEDICAID

## 2024-09-16 VITALS — WEIGHT: 178 LBS | HEIGHT: 67 IN | BODY MASS INDEX: 27.94 KG/M2

## 2024-09-16 DIAGNOSIS — M17.12 PRIMARY OSTEOARTHRITIS OF LEFT KNEE: Primary | ICD-10-CM

## 2024-09-16 RX ORDER — TRIAMCINOLONE ACETONIDE 40 MG/ML
40 INJECTION, SUSPENSION INTRA-ARTICULAR; INTRAMUSCULAR ONCE
Status: COMPLETED | OUTPATIENT
Start: 2024-09-16 | End: 2024-09-16

## 2024-09-16 RX ORDER — METHYLPREDNISOLONE 4 MG
TABLET, DOSE PACK ORAL
Qty: 21 EACH | Refills: 0 | Status: SHIPPED | OUTPATIENT
Start: 2024-09-16

## 2024-09-16 RX ADMIN — TRIAMCINOLONE ACETONIDE 40 MG: 40 INJECTION, SUSPENSION INTRA-ARTICULAR; INTRAMUSCULAR at 10:51:00

## 2024-09-16 NOTE — PROCEDURES
Patient going out of town and requested medications for pain of her knees and right lateral leg.  I discussed with the patient that our office would not prescribe narcotic medications at this point in time since she has not recently undergone surgery by our office, nor is there any indication for surgery at this juncture.  Unfortunately, radiofrequency ablation by Dr. Bipin Brown did not help with the patient's symptoms.  I did provide the patient with a foot and ankle conditioning packet and sent a Medrol Dosepak to her pharmacy.  She did inquire about Synvisc series of 3 as she felt good relief from this reportedly in the past despite Synvisc 1 not offering as much relief.  Will submit for prior authorization.    Risks and benefits of knee injection discussed with the patient, with risks including but not limited to pain and swelling at the injection site and/or within the knee joint, infection, elevation in blood pressure and/or glucose levels, facial flushing. After informed consent, the patient's left knee was marked, locally anesthetized with skin refrigerant, prepped with topical antiseptic, and injected with a mixture of 1mL 40mg/mL Kenalog, 2mL 1% lidocaine and 2mL 0.5% marcaine through the inferolateral portal.  A band-aid was applied.  The patient tolerated the procedure well.    Laurent Patterson PA-C  [facility]

## 2024-09-17 ENCOUNTER — OFFICE VISIT (OUTPATIENT)
Dept: INTERNAL MEDICINE CLINIC | Facility: CLINIC | Age: 59
End: 2024-09-17
Payer: MEDICAID

## 2024-09-17 VITALS
HEART RATE: 71 BPM | BODY MASS INDEX: 27.94 KG/M2 | HEIGHT: 67 IN | WEIGHT: 178 LBS | TEMPERATURE: 97 F | OXYGEN SATURATION: 98 % | SYSTOLIC BLOOD PRESSURE: 110 MMHG | DIASTOLIC BLOOD PRESSURE: 65 MMHG

## 2024-09-17 DIAGNOSIS — I10 PRIMARY HYPERTENSION: ICD-10-CM

## 2024-09-17 DIAGNOSIS — M15.0 PRIMARY OSTEOARTHRITIS INVOLVING MULTIPLE JOINTS: ICD-10-CM

## 2024-09-17 DIAGNOSIS — E11.9 TYPE 2 DIABETES MELLITUS WITHOUT COMPLICATION, WITHOUT LONG-TERM CURRENT USE OF INSULIN (HCC): Primary | ICD-10-CM

## 2024-09-17 DIAGNOSIS — Z12.31 ENCOUNTER FOR SCREENING MAMMOGRAM FOR MALIGNANT NEOPLASM OF BREAST: ICD-10-CM

## 2024-09-17 DIAGNOSIS — E78.2 MIXED HYPERLIPIDEMIA: ICD-10-CM

## 2024-09-17 DIAGNOSIS — Z87.891 HISTORY OF CIGARETTE SMOKING: ICD-10-CM

## 2024-09-17 PROBLEM — E78.00 HYPERCHOLESTEREMIA: Status: RESOLVED | Noted: 2023-06-05 | Resolved: 2024-09-17

## 2024-09-17 PROBLEM — K57.92 DIVERTICULITIS: Status: RESOLVED | Noted: 2024-03-20 | Resolved: 2024-09-17

## 2024-09-17 PROBLEM — E11.65 TYPE 2 DIABETES MELLITUS WITH HYPERGLYCEMIA, WITHOUT LONG-TERM CURRENT USE OF INSULIN (HCC): Status: RESOLVED | Noted: 2023-06-05 | Resolved: 2024-09-17

## 2024-09-17 PROBLEM — Z23 NEED FOR DIPHTHERIA-TETANUS-PERTUSSIS (TDAP) VACCINE: Status: RESOLVED | Noted: 2024-02-16 | Resolved: 2024-09-17

## 2024-09-17 LAB — HEMOGLOBIN A1C: 6.7 % (ref 4.3–5.6)

## 2024-09-17 PROCEDURE — 99214 OFFICE O/P EST MOD 30 MIN: CPT | Performed by: INTERNAL MEDICINE

## 2024-09-17 PROCEDURE — 83036 HEMOGLOBIN GLYCOSYLATED A1C: CPT | Performed by: INTERNAL MEDICINE

## 2024-09-17 RX ORDER — HYDROCODONE BITARTRATE AND ACETAMINOPHEN 5; 325 MG/1; MG/1
1 TABLET ORAL DAILY PRN
Qty: 15 TABLET | Refills: 0 | Status: SHIPPED | OUTPATIENT
Start: 2024-09-17 | End: 2024-09-18

## 2024-09-17 RX ORDER — ALBUTEROL SULFATE 90 UG/1
2 INHALANT RESPIRATORY (INHALATION) 4 TIMES DAILY PRN
Qty: 1 EACH | Refills: 0 | Status: SHIPPED | OUTPATIENT
Start: 2024-09-17

## 2024-09-17 NOTE — PROGRESS NOTES
Sandra Roche  2/1/1965    Chief Complaint   Patient presents with    Diabetes     Rm 12 SS. Diabetic follow up. Pt said will schedule mammogram and go to lab after visit. Travelling soon.      SUBJECTIVE   Sandra Roche is a 59 year old female who presents for follow up.    POC A1c 6.7% which is a very significant improvement from 7.6% in Feb.    She will be traveling to [place], [place] for ongoing trial regarding her son's very unfortunate death in 2021.     Needs refill of qualifyor for traveling. Anticipates arthritic pain from getting in and out of plane, etc.    Review of Systems   Review of Systems   No f/c/chest pain or sob. No cough. No abd pain/n/v/d. No ha or dizziness. No numbness, tingling, or weakness.  OBJECTIVE:   /65   Pulse 71   Temp 97 °F (36.1 °C) (Temporal)   Ht 5' 7\" (1.702 m)   Wt 178 lb (80.7 kg)   SpO2 98%   BMI 27.88 kg/m²   Physical Exam   Constitutional: Oriented to person, place, and time. No distress.   Cardiovascular: Normal rate, regular rhythm and intact distal pulses.  No murmur, rubs or gallops.   Pulmonary/Chest: Effort normal and breath sounds normal. No respiratory distress.  Skin: Skin is warm and dry. No rash.    Lab Results   Component Value Date     (H) 03/22/2024    BUN 7 (L) 03/22/2024    CREATSERUM 0.73 03/22/2024    ANIONGAP 0 03/22/2024    CA 9.1 03/22/2024     03/22/2024    K 3.5 03/22/2024     03/22/2024    CO2 27.0 03/22/2024    OSMOCALC 287 03/22/2024      Lab Results   Component Value Date    WBC 10.0 03/22/2024    RBC 3.93 03/22/2024    HGB 10.4 (L) 03/22/2024    HCT 33.0 (L) 03/22/2024    MCV 84.0 03/22/2024    MCH 26.5 03/22/2024    MCHC 31.5 03/22/2024    RDW 13.6 03/22/2024    .0 03/22/2024      Lab Results   Component Value Date    TSH 2.760 09/27/2023        ASSESSMENT AND PLAN:       ICD-10-CM    1. Type 2 diabetes mellitus without complication, without long-term current use of insulin (HCC)  E11.9 POC Hemoglobin  A1C  A1c  is 6.7% which is a significant improvement. Continue Metformin  mg. Instructed to call insurance to find ophtho for eye examination.      2. History of cigarette smoking  Z87.891 albuterol 108 (90 Base) MCG/ACT Inhalation Aero Soln      3. Encounter for screening mammogram for malignant neoplasm of breast  Z12.31 [facility] JODY 2D+3D SCREENING BILAT (CPT=77067/09101)      4. Primary osteoarthritis involving multiple joints  M15.0 HYDROcodone-acetaminophen (NORCO) 5-325 MG Oral Tab      5. Primary hypertension  I10 BP is excellently controlled today. Continue Amlodipine 5 mg and Triamterene-hydrochlorothiazide 37.5-25 mg for now.      6. Mixed hyperlipidemia  E78.2 Most recent lipid panel looks great. Discussed continuing given history of prior stroke and diabetes. Patient is agreeable to plan.          The patient indicates understanding of these issues and agrees to the plan.  The patient is asked to return or present to the emergency room for worsening of symptoms.    TODAY'S ORDERS     No orders of the defined types were placed in this encounter.      Meds & Refills:  Requested Prescriptions      No prescriptions requested or ordered in this encounter       Imaging & Consults:  None    No follow-ups on file.  There are no Patient Instructions on file for this visit.    All questions were answered and the patient agrees with the plan.     Thank you,  Mariaelena Valle, DO

## 2024-09-18 ENCOUNTER — TELEPHONE (OUTPATIENT)
Dept: INTERNAL MEDICINE CLINIC | Facility: CLINIC | Age: 59
End: 2024-09-18

## 2024-09-18 RX ORDER — HYDROCODONE BITARTRATE AND ACETAMINOPHEN 5; 325 MG/1; MG/1
1 TABLET ORAL EVERY 6 HOURS PRN
Qty: 20 TABLET | Refills: 0 | Status: SHIPPED | OUTPATIENT
Start: 2024-09-18 | End: 2024-09-23

## 2024-09-18 NOTE — TELEPHONE ENCOUNTER
Spoke to patient to let her know DR DONI CHRISTIANSON updated her prescription for Norco due to Insurance criteria and sent to [facility] on file.  Pt was called to let her know that she should be taking the Norco as needed.  Pt verbalizes understanding.

## 2024-10-01 ENCOUNTER — APPOINTMENT (OUTPATIENT)
Dept: PHYSICAL THERAPY | Facility: HOSPITAL | Age: 59
End: 2024-10-01
Attending: INTERNAL MEDICINE
Payer: MEDICAID

## 2024-10-03 ENCOUNTER — APPOINTMENT (OUTPATIENT)
Dept: PHYSICAL THERAPY | Facility: HOSPITAL | Age: 59
End: 2024-10-03
Payer: MEDICAID

## 2024-10-07 ENCOUNTER — OFFICE VISIT (OUTPATIENT)
Dept: PHYSICAL THERAPY | Facility: HOSPITAL | Age: 59
End: 2024-10-07
Attending: PHYSICIAN ASSISTANT
Payer: MEDICAID

## 2024-10-07 DIAGNOSIS — G47.00 INSOMNIA, UNSPECIFIED TYPE: ICD-10-CM

## 2024-10-07 PROCEDURE — 97112 NEUROMUSCULAR REEDUCATION: CPT | Performed by: PHYSICAL THERAPIST

## 2024-10-07 PROCEDURE — 97164 PT RE-EVAL EST PLAN CARE: CPT | Performed by: PHYSICAL THERAPIST

## 2024-10-07 PROCEDURE — 97110 THERAPEUTIC EXERCISES: CPT | Performed by: PHYSICAL THERAPIST

## 2024-10-07 RX ORDER — ZOLPIDEM TARTRATE 10 MG/1
10 TABLET ORAL NIGHTLY PRN
Qty: 30 TABLET | Refills: 0 | Status: SHIPPED | OUTPATIENT
Start: 2024-10-07 | End: 2024-11-11

## 2024-10-07 NOTE — TELEPHONE ENCOUNTER
Recent Fills: 05/14/2024, 06/14/2024, 08/20/2024    Last Rx Written: 08/19/2024    Last Office Visit: 09/17/2024

## 2024-10-07 NOTE — TELEPHONE ENCOUNTER
Atorvastatin 40m year supply sent to [facility] in [place] on 07/10/2024    Amlodipine 5m year supply sent to [facility] in [place] on 07/10/2024

## 2024-10-07 NOTE — TELEPHONE ENCOUNTER
Sandra Roche requesting Medication Refill for:    Medication name and dose (copy and paste from medication list):   amLODIPine 5 MG Oral Tab 90 tablet 3 8/19/2024 --   Sig:   TAKE 1 TABLET(5 MG) BY MOUTH DAILY     Route:   Oral     Order #:   [id number]         If medication is not on medication list - transfer patient to RN queue for triage    Preferred Pharmacy:   [id number] - [place], [place] - [address] AT [address], [phone], [phone]   [address] [place] [address]   Phone: [phone] Fax: [phone]   Hours: Open 24 hours         Sandra Roche requesting Medication Refill for:    Medication name and dose (copy and paste from medication list):     Medication Detail    Medication Quantity Refills Start End   atorvastatin 40 MG Oral Tab 90 tablet 3 7/10/2024 --   Sig:   Take 1 tablet (40 mg total) by mouth nightly.     Route:   Oral     Order #:   [id number]       If medication is not on medication list - transfer patient to RN queue for triage      Medication Quantity Refills Start End   zolpidem 10 MG Oral Tab 30 tablet 0 8/19/2024 --   Sig:   Take 1 tablet (10 mg total) by mouth nightly as needed.     Route:   Oral     Order #:   [id number]         LOV: 9/17/2024   Last Refill date: 7/10/2024  Next Scheduled appointment: no upcoming appointment.         LOV: 9/17/2024   Last Refill date: 8/19/2024  Next Scheduled appointment:

## 2024-10-07 NOTE — PROGRESS NOTES
Diagnosis:   Primary osteoarthritis of left knee (M17.12)         Referring Provider: Laurent Patterson  Date of Evaluation:    4/15/24    Precautions:  None Next MD visit:   none scheduled  Date of Surgery: 2021   Insurance Primary/Secondary: [employer]ICA[employer] / N/A     # Auth Visits: 14            Subjective:  left knee is getting bad, had to get cortisone shot, is going to try the three type of knee injection .  Right knee is the same. Has been out of town due to family business and has note been able to attend PT due to scheduling , family conflicts.  Have been trying to keep up with my exercise.   During the time of being OOT she had been trying complete her HEP . Hoping the shots to the left knee will help the pain .  Not sure what they can do for the right knee.   Pain: 8 /10 right knee   8/10 pain when  I am going upstairs .  Coming down more pain with going down vs. Up .     Pain described as sharp, notes the pain to be weather related.         Objective: AROM: (* denotes performed with pain)  Hip Knee   Flexion: R nl; L nl  Extension: R nl; L nl  Abduction: R nl; L nl  ER: R nl; L nl  IR: R nl; L nl Flexion: R 99; L 115  Extension: R -3; L -10         Strength/MMT: (* denotes performed with pain)  Knee   Flexion: R 4/5; L 4/5  Extension: R 4-/5; L 4/5         Gait: improved effort of initiating heel strike with gait.   Balance: SLS: R 2sec, L 5sec  HHA bilaterally   Posture: bilateral genu valgus left>right   LEFS Score  LEFS Score: 23.75 % (4/15/2024 12:28 PM)    Post LEFS Score  Post LEFS Score: 51.25 % (7/29/2024 11:49 AM)    27.5 % improvement     LEFS Score  LEFS Score: 23.75 % (4/15/2024 12:28 PM)    Post LEFS Score  Post LEFS Score: 63.75 % (10/7/2024 10:33 AM)    40 % improvement     Assessment:  Has been unable to attend PT due to family and scheduling conflicts, and has noted pain and discomfort to be fluctuating .  She states that she has been trying to keep up with exercise.  We reviewed  her HEP and emphasis on CKC exercise, and added using step up downs with steps at home for HEP shaquille with emphasis on function shaquille with stairs.  Feel that she is continuing to be below baseline and would benefit from continued PT for strength, shaquille functional strength and stairs, endurance for bilateral LE's     Goals:   Goals: (to be met in 14 visits)  Pt will improve knee extension ROM to 0 deg to allow proper heel strike during gait and terminal knee extension in stance PROGRESSING    Pt will improve knee AROM flexion to >92 degrees to improve ability to perform home , self care, transfers  PROGRESSING    Pt will improve quad strength to 5/5 to ascend 1 flight of stairs reciprocally without UE assist   Pt will increase hip and knee strength to grossly 4+/5 to be able to get up and down from the floor safely PROGRESSING    Pt will demonstrate increased hip ER/ABD strength to 5/5 to perform stepping and squatting activities without excessive femoral IR/ADD PROGRESSING    Pt will improve SLS to >5s to improve safety with gait on uneven surfaces such as grass and gravel PROGRESSING    Pt will be independent and compliant with comprehensive HEP to maintain progress achieved in PT  PROGRESSING      Plan:  Patient will be seen for 2 x/week or a total of 10visits over a 90 day period. Treatment will include: Gait training, Manual Therapy, Neuromuscular Re-education, Self-Care Home Management, Therapeutic Activities, Therapeutic Exercise, and Home Exercise Program instruction   Date: 5/13/2024   Tx#:  7 Date: 5/21/2024   Tx#:  8 Date: 5/30/2024   Tx#:  9 Date: 6/17/2024   Tx#:  10/ Date: 6/20/2024   Tx#:  11/14 Date: 6/26/2024  Tx#:12/14 Date: 7/23/2024   Tx#:  13/14 Date: 8/16/2024   Tx#: 14/14   Date: 8/20/2024   Tx#:   Date: 10/7/2024   Tx#:     Bike x6'    SAQ x30 4 #   SLR x15 4#     Shuttle x20 with VMO 50#  Shuttle x50 # b squats x20   Single leg x20 50#    Bike x6'    Prone hang x2' 2#  Shuttle x20 with VMO  50#  Shuttle x50 # b squats x20   Single leg x20 50#   Balance board for calf stretch x3@30 sec   Balance board x2' each direction   TKE with red TB x20  Bike x6'    Prone hang x2' 2#  Shuttle x20 with VMO 50#  Shuttle x50 # b squats x20   Single leg x20 50#   Balance board for calf stretch x3@30 sec   Balance board x2' each direction   TKE with red TB x20  Bike x6'  Shuttle x20 with VMO 50#  Shuttle x50 # b squats x20     Supine:  2.5#  SLR x20   SAQ x20   LAQ x20   QS x20 emphasis on extension   Instructed in sitting HS with hip hinge x3@ 30sec    Bike x6'    Supine: 3#  SLR x20 x2  SAQ x20 x2  LAQ x20 x2    Standing grntb x walks x10 x2 sets   TKEs with ball against wall x2x10 bilateral   Wall slide to ~ 30 with ball for VMO   Bosu step ups x10 each LE  Therex: 30 min  Upright bike 6 min  Supine: 3#  SLR x20 x2  SAQ x20 x2  TKEs with ball against wall x2x10 bilateral   Wall slide to ~ 30 with ball for VMO, x10 5 sec hold  Standing grntb x walks x10 steps x2 sets   Side stepping with green band, 2x10 steps   TKEs with ball against wall x2x10 bilateral   Side stepping with green band, 2x10 steps  Step up over down x10 6\"  Shuttle 61# x30 b VMO   X20 single leg   Standing grntb x walks x10 steps x2 sets   Side stepping with green band, 2x10 steps  Bike x5'    TKEs with ball against wall x2x10 bilateral   Side stepping with green band, 2x10 steps  Bosu step ups x10 each   Bosu lunges x10 ea   Shuttle 61# x30 b VMO   X20 single leg   Heel r  Standing grntb x walks x10 steps x2 sets   Side stepping with green band, 2x10 steps  Bike x5'   TKE with ball at wall 2x10  Bike x5' res 4     TKEs with ball against wall x2x10 bilateral     Bosu step ups x10   Bosu lunges x10   Side step x5 red TC   BW red T Cx10     Shuttle 61# x30 b VMO   X20 single leg   Prone:   HS curls x15 5#   HS curls x10 without weight   Sitting:  Knee flexion 2x10 5#  Heel raises on airex x10   Mini knee bends on airex x10  Recheck HEP   Re-eval      Step up down   X5 each knee each direction using stair at home     BKFO x20 Grn TB   Band with ball grn tb x20   SLB at table /HEP   Heel raises x10 (HEP )    STM to right knee PF, lateral joint line, PF mobs into med/lateral, superior inferior glide/ STM to right knee PF, lateral joint line, PF mobs into med/lateral, superior inferior glide/ PROM of right knee with emphasis  on knee extension  STM to right knee PF, lateral joint line, PF mobs into med/lateral, superior inferior glide/  Ant tib fib mobs to increase flexion of right knee multiple bouts  Manual: 10 min  A/P tibiofemoral glides, 2x20  STM to R medial knee Manual: 10 min  A/P tibiofemoral glides, 2x20  STM to R medial knee                              HEP: Jaelyn    Charges: Ther ex x3   Total Timed Treatment: 40 min  Total Treatment Time: 40 min

## 2024-10-08 ENCOUNTER — TELEPHONE (OUTPATIENT)
Dept: RHEUMATOLOGY | Facility: CLINIC | Age: 59
End: 2024-10-08

## 2024-10-08 ENCOUNTER — OFFICE VISIT (OUTPATIENT)
Facility: CLINIC | Age: 59
End: 2024-10-08
Payer: MEDICAID

## 2024-10-08 DIAGNOSIS — M17.12 PRIMARY OSTEOARTHRITIS OF LEFT KNEE: Primary | ICD-10-CM

## 2024-10-08 DIAGNOSIS — Z96.651 CHRONIC KNEE PAIN AFTER TOTAL REPLACEMENT OF RIGHT KNEE JOINT: ICD-10-CM

## 2024-10-08 DIAGNOSIS — G89.29 CHRONIC KNEE PAIN AFTER TOTAL REPLACEMENT OF RIGHT KNEE JOINT: ICD-10-CM

## 2024-10-08 DIAGNOSIS — M25.561 CHRONIC KNEE PAIN AFTER TOTAL REPLACEMENT OF RIGHT KNEE JOINT: ICD-10-CM

## 2024-10-08 PROCEDURE — 20610 DRAIN/INJ JOINT/BURSA W/O US: CPT | Performed by: PHYSICIAN ASSISTANT

## 2024-10-08 NOTE — TELEPHONE ENCOUNTER
Patient had surgery on 3/20/24 for ROBOTIC LOW ANTERIOR COLON RESECTION.  Patient states she is having a lot of stomach and abdominal pain.  She has been scheduled to see Dr. Whatley on 12/5/24 but states she needs to get in sooner so see him.  Please call patient and advise if we can get her in sooner.    Call back number is [phone]

## 2024-10-08 NOTE — PROCEDURES
Patient asked about obtaining right knee X-rays at the next visit. This will be ordered and patient can get X-Rays done at next visit.    Risks and benefits of knee injection discussed with the patient, with risks including but not limited to pain and swelling at the injection site and/or within the knee joint, infection, elevation in blood pressure and/or glucose levels, facial flushing. After informed consent, the patient's left knee was marked, locally anesthetized with skin refrigerant, prepped with topical antiseptic, and injected with 2mL of 16mg/2mL Synvisc through the inferolateral portal.  A band-aid was applied.  The patient tolerated the procedure well.    Laurent Patterson PA-C  [facility]

## 2024-10-08 NOTE — TELEPHONE ENCOUNTER
Spoke with patient, reports sharp pain in center of stomach x 1 week. Mild nausea at times.  Denies change in meds, diet, or lifestyle.  States she tried omeprazole once, which made her vomit. Has not tried any other OTC meds.  Denies constipation/diarrhea, fever, or localized swelling.  Scheduled w/CRISTINA 10/24/24.

## 2024-10-10 ENCOUNTER — APPOINTMENT (OUTPATIENT)
Dept: PHYSICAL THERAPY | Facility: HOSPITAL | Age: 59
End: 2024-10-10
Attending: INTERNAL MEDICINE
Payer: MEDICAID

## 2024-10-15 ENCOUNTER — HOSPITAL ENCOUNTER (OUTPATIENT)
Dept: GENERAL RADIOLOGY | Age: 59
Discharge: HOME | End: 2024-10-15
Attending: PHYSICIAN ASSISTANT
Payer: MEDICAID

## 2024-10-15 ENCOUNTER — OFFICE VISIT (OUTPATIENT)
Facility: CLINIC | Age: 59
End: 2024-10-15
Payer: MEDICAID

## 2024-10-15 ENCOUNTER — HOSPITAL ENCOUNTER (OUTPATIENT)
Dept: MAMMOGRAPHY | Age: 59
Discharge: HOME | End: 2024-10-15
Attending: INTERNAL MEDICINE
Payer: MEDICAID

## 2024-10-15 DIAGNOSIS — Z12.31 ENCOUNTER FOR SCREENING MAMMOGRAM FOR MALIGNANT NEOPLASM OF BREAST: ICD-10-CM

## 2024-10-15 DIAGNOSIS — M17.12 PRIMARY OSTEOARTHRITIS OF LEFT KNEE: Primary | ICD-10-CM

## 2024-10-15 DIAGNOSIS — G89.29 CHRONIC KNEE PAIN AFTER TOTAL REPLACEMENT OF RIGHT KNEE JOINT: ICD-10-CM

## 2024-10-15 DIAGNOSIS — Z96.651 CHRONIC KNEE PAIN AFTER TOTAL REPLACEMENT OF RIGHT KNEE JOINT: ICD-10-CM

## 2024-10-15 DIAGNOSIS — M25.561 CHRONIC KNEE PAIN AFTER TOTAL REPLACEMENT OF RIGHT KNEE JOINT: ICD-10-CM

## 2024-10-15 PROCEDURE — 77063 BREAST TOMOSYNTHESIS BI: CPT | Performed by: INTERNAL MEDICINE

## 2024-10-15 PROCEDURE — 77067 SCR MAMMO BI INCL CAD: CPT | Performed by: INTERNAL MEDICINE

## 2024-10-15 PROCEDURE — 20610 DRAIN/INJ JOINT/BURSA W/O US: CPT | Performed by: PHYSICIAN ASSISTANT

## 2024-10-15 PROCEDURE — 73562 X-RAY EXAM OF KNEE 3: CPT | Performed by: PHYSICIAN ASSISTANT

## 2024-10-15 NOTE — PROCEDURES
Reviewed radiographs of the right knee with the patient which appear stable in comparison to radiographs obtained in June 2023.    Risks and benefits of knee injection discussed with the patient, with risks including but not limited to pain and swelling at the injection site and/or within the knee joint, infection, elevation in blood pressure and/or glucose levels, facial flushing. After informed consent, the patient's left knee was marked, locally anesthetized with skin refrigerant, prepped with topical antiseptic, and injected with 2mL of 16mg/2mL Synvisc through the inferolateral portal.  A band-aid was applied.  The patient tolerated the procedure well.    Laurent Patterson PA-C  [facility]

## 2024-10-17 ENCOUNTER — APPOINTMENT (OUTPATIENT)
Dept: PHYSICAL THERAPY | Facility: HOSPITAL | Age: 59
End: 2024-10-17
Payer: MEDICAID

## 2024-10-17 ENCOUNTER — TELEPHONE (OUTPATIENT)
Dept: PHYSICAL THERAPY | Facility: HOSPITAL | Age: 59
End: 2024-10-17

## 2024-10-17 ENCOUNTER — TELEPHONE (OUTPATIENT)
Dept: PHYSICAL THERAPY | Facility: HOSPITAL | Age: 59
End: 2024-10-17
Attending: INTERNAL MEDICINE
Payer: MEDICAID

## 2024-10-17 DIAGNOSIS — M17.12 PRIMARY OSTEOARTHRITIS OF LEFT KNEE: Primary | ICD-10-CM

## 2024-10-22 ENCOUNTER — OFFICE VISIT (OUTPATIENT)
Facility: CLINIC | Age: 59
End: 2024-10-22
Payer: MEDICAID

## 2024-10-22 DIAGNOSIS — M17.12 PRIMARY OSTEOARTHRITIS OF LEFT KNEE: Primary | ICD-10-CM

## 2024-10-22 PROCEDURE — 20610 DRAIN/INJ JOINT/BURSA W/O US: CPT | Performed by: PHYSICIAN ASSISTANT

## 2024-10-22 NOTE — PROCEDURES
Risks and benefits of knee injection discussed with the patient, with risks including but not limited to pain and swelling at the injection site and/or within the knee joint, infection, elevation in blood pressure and/or glucose levels, facial flushing. After informed consent, the patient's left knee was marked, locally anesthetized with skin refrigerant, prepped with topical antiseptic, and injected with 2mL of 16mg/2mL Synvisc through the inferolateral portal.  A band-aid was applied.  The patient tolerated the procedure well.    Laurent Patterson PA-C  [facility]

## 2024-10-30 ENCOUNTER — TELEPHONE (OUTPATIENT)
Dept: INTERNAL MEDICINE CLINIC | Facility: CLINIC | Age: 59
End: 2024-10-30

## 2024-10-31 ENCOUNTER — TELEPHONE (OUTPATIENT)
Dept: PHYSICAL THERAPY | Facility: HOSPITAL | Age: 59
End: 2024-10-31

## 2024-10-31 ENCOUNTER — APPOINTMENT (OUTPATIENT)
Dept: PHYSICAL THERAPY | Facility: HOSPITAL | Age: 59
End: 2024-10-31
Attending: INTERNAL MEDICINE
Payer: MEDICAID

## 2024-10-31 NOTE — TELEPHONE ENCOUNTER
Spoke to pt. Regarding missed appt.  She states that she wasn't sure if appt would be covered thru insurance, and was waiting to hear from us to see if it was approved.  Stated that her next visit, 11/7/24 will be an evaluation and we will submit to [employer] for additional authorization visits after the IE.

## 2024-11-04 ENCOUNTER — APPOINTMENT (OUTPATIENT)
Dept: PHYSICAL THERAPY | Facility: HOSPITAL | Age: 59
End: 2024-11-04
Payer: MEDICAID

## 2024-11-07 ENCOUNTER — OFFICE VISIT (OUTPATIENT)
Dept: PHYSICAL THERAPY | Facility: HOSPITAL | Age: 59
End: 2024-11-07
Attending: INTERNAL MEDICINE
Payer: MEDICAID

## 2024-11-07 DIAGNOSIS — M17.12 PRIMARY OSTEOARTHRITIS OF LEFT KNEE: Primary | ICD-10-CM

## 2024-11-07 PROCEDURE — 97110 THERAPEUTIC EXERCISES: CPT | Performed by: PHYSICAL THERAPIST

## 2024-11-07 PROCEDURE — 97162 PT EVAL MOD COMPLEX 30 MIN: CPT | Performed by: PHYSICAL THERAPIST

## 2024-11-07 NOTE — PROGRESS NOTES
LOWER EXTREMITY EVALUATION:     Diagnosis:   Primary osteoarthritis of left knee (M17.12)         Referring Provider: Laurent Patterson  Date of Evaluation:    11/7/2024    Precautions:  None Next MD visit:   none scheduled  Date of Surgery: n/a     PATIENT SUMMARY   Sandra Roche is a 59 year old female who presents to therapy today with complaints of bilateral knee pain .  S/p right TKR , and now with left knee pain , OA and s/p multiple injection for relief. Right >left knee pain , can't describe pain , some days are good, some are bad.  Rainy days/weather affect.    Pain with completing  ADL's , climbing stairs.  Climbs Step at a time, ahcey tires to switch it with each leg when coming down.   Not driving at present     No sig. Discomfort with dressing , has learned to modify the pattern and sequence.   Chair transfers ok , when I sit for a long time , pain increases/  has to stand for a short period of time before walking to start knees moving .   Knees is completing exercise, sidelying is the most effective, is trying to keep up with them .   Babysit's grandchildren so keeping up with them is hard.     Pt describes pain level current 4/10, at best 4/10, at worst 4/10.   Current functional limitations include limited with most ADL;s including home , self care, climbing stairs, transfers from sit>stand shaquille car.  Walking tow blocks .     Sandra describes prior level of function independent with all adls;. Pt goals include to get my knees stronger .  Past medical history was reviewed with Sandra. Significant findings include   Past Medical History:    Diabetes (HCC)    Diverticulosis of large intestine    Esophageal reflux    Essential hypertension    High blood pressure    High cholesterol    Osteoarthritis    Pneumonia due to organism    Stroke (HCC)    2008    Visual impairment    READING GLASSES        ASSESSMENT  Sandra presents to physical therapy evaluation with primary c/o left >right knee pain.  She  is s/p right TKR and has had persistent weakness, loss of ROM and functional strength of the right LE.  She has been treated with PT int the past , with primary focus on HEP .Currently she is now having left knee pain ,and has been working with ortho for injections and pain management ;  the MD has recommended OP PT for additional strengthening , ROM , functional ROM and strengthen to left knee. . The results of the objective tests and measures show overall loss of functional strength, decreased stance time with SLB and with pain with activities shaquille climbing stairs, helping to care for grandchildren. .  .  Signs and symptoms are consistent with diagnosis of left knee pain, OA . Pt and PT discussed evaluation findings, pathology, POC and HEP.  Pt voiced understanding and performs HEP correctly without reported pain. Skilled Physical Therapy is medically necessary to address the above impairments and reach functional goals.     OBJECTIVE:   Observation: stands in genu valgus bilaterally, right >left.  Short stance phase with decreased hip extension to the right .  Palpation: TTP along left ITB, fat pad, MCL/LCL ligaments Crepitus with PF mobility , AROM bilaterally   Sensation: intact     AROM: (* denotes performed with pain)  Hip Knee Foot/Ankle   Flexion: R nl; L nl  Extension: R nl; L nl  Abduction: R nl; L nl  ER: R nl; L nl  IR: R nl; L nl Flexion: R 102; L 110  Extension: R -10; L -10    DF: R15; L 15  PF: R 25; L 25  INV: R WNL; L WNL  EV: R WNL; L WNL  Great toe ext: R wnl; L wnl     Accessory motion: tightness with PF glide.     Flexibility:    Hamstrings: R 35; L 35    Strength/MMT: (* denotes performed with pain)  Hip Knee Foot/Ankle   Flexion: R 4/5; L 4-/5  Extension: R 3/5; L 3/5  Abduction: R 3/5; L 3/5  ER: R 3/5; L 3/5  IR: R 3/5; L 3/5 Flexion: R 4/5; L 4/5  Extension: R 4/5; L 4/5    DF: R 4/5; L 4/5  PF: R 4/5; L 4/5  INV: R 4/5; L 4/5  EV: R 4/5; L 4/5  Great toe ext: R 4/5; L 4/5     Special  tests:   -SLR   XRAY:       FINDINGS:  Extensive postoperative changes of the right knee.  No acute displaced osseous fracture is identified.  Severe osteoarthritis at the left patellofemoral compartment.  No significant left elbow joint effusion.     Gait: pt ambulates on level ground with antalgia, decreased step length bilaterally, right >left , decreased stance phase right >left , shuffle, and decreased foot clearance bilaterally   Balance: SLS: R 5 sec, L 8 sec    Today’s Treatment and Response:   Pt education was provided on exam findings, treatment diagnosis, treatment plan, expectations, and prognosis. Pt was also provided recommendations for activity modifications, possible soreness after evaluation, modalities as needed [ice/heat], postural corrections, ergonomics, pain science education , detrimental fear avoidance behaviors, importance of remaining active, and strategies to reduce fall risk at home.  Patient was instructed in and issued a HEP for:     Access Code: [email or web address]  URL: [email or web address]  Date: 11/08/2024  Prepared by: Lolis Szymanski    Exercises  - Clamshell  - 1 x daily - 7 x weekly - 1 sets - 10 reps  - Supine Bridge  - 1 x daily - 7 x weekly - 1 sets - 10 reps  - Supine Active Straight Leg Raise  - 1 x daily - 7 x weekly - 1 sets - 10 reps  - Seated Long Arc Quad  - 1 x daily - 7 x weekly - 2 sets - 10 reps  - Single Leg Stance with Support  - 1 x daily - 7 x weekly - 1 sets - 10 reps  - Heel Raises with Counter Support  - 1 x daily - 7 x weekly - 1 sets - 10 reps  - Forward Step Down with Counter Support at Side  - 1 x daily - 7 x weekly - 1 sets - 10 reps  - Forward Step Up  - 1 x daily - 7 x weekly - 1 sets - 10 reps  Charges: PT Eval Moderate Complexity, TE1       Total Timed Treatment: 45 min     Total Treatment Time: 45 min     Based on clinical rationale and outcome measures, this evaluation involved Moderate Complexity decision making due to 3+ personal  factors/comorbidities, 3 body structures involved/activity limitations, and evolving symptoms including changing pain levels.  PLAN OF CARE:    Goals: (to be met in 10 visits)  Pt will improve knee extension ROM to 0 deg to allow proper heel strike during gait and terminal knee extension in stance   Pt will improve knee AROM flexion to >120 degrees to improve ability to perform stairs in reciprocal manner ,  car transfers   Pt will improve quad strength to 5/5 to ascend 1 flight of stairs reciprocally without UE assist   Pt will increase hip and knee strength to grossly 4+/5 to be able to get up and down from the floor safely   Pt will demonstrate increased hip ER/ABD strength to 5-/5 to perform stepping and squatting activities without excessive femoral IR/ADD   Pt will improve SLS to > 10s to improve safety with gait on uneven surfaces such as grass and gravel  Pt will be independent and compliant with comprehensive HEP to maintain progress achieved in PT      Frequency / Duration: Patient will be seen for 1-2 x/week or a total of 10 visits over a 90 day period. Treatment will include: Gait training, Manual Therapy, Neuromuscular Re-education, Self-Care Home Management, Therapeutic Activities, Therapeutic Exercise, and Home Exercise Program instruction    Education or treatment limitation: None  Rehab Potential:excellent    LEFS Score  LEFS Score: (Patient-Rptd) 57.5 % (11/7/2024 12:06 PM)      Patient/Family/Caregiver was advised of these findings, precautions, and treatment options and has agreed to actively participate in planning and for this course of care.    Thank you for your referral. Please co-sign or sign and return this letter via fax as soon as possible to [phone]. If you have any questions, please contact me at Dept: [phone]    Sincerely,  Electronically signed by therapist: Lolis Szymanski, PT  Physician's certification required: Yes  I certify the need for these services furnished under this  plan of treatment and while under my care.    X___________________________________________________ Date____________________    Certification From: 11/7/2024  To:2/5/2025

## 2024-11-11 ENCOUNTER — OFFICE VISIT (OUTPATIENT)
Dept: PHYSICAL THERAPY | Facility: HOSPITAL | Age: 59
End: 2024-11-11
Attending: INTERNAL MEDICINE
Payer: MEDICAID

## 2024-11-11 DIAGNOSIS — G47.00 INSOMNIA, UNSPECIFIED TYPE: ICD-10-CM

## 2024-11-11 PROCEDURE — 97110 THERAPEUTIC EXERCISES: CPT | Performed by: PHYSICAL THERAPIST

## 2024-11-11 NOTE — TELEPHONE ENCOUNTER
Sandra Roche requesting Medication Refill for:    Medication name and dose (copy and paste from medication list):   zolpidem 10 MG Oral Tab 30 tablet 0 10/7/2024 --   Sig:   Take 1 tablet (10 mg total) by mouth nightly as needed.     Route:   Oral         If medication is not on medication list - transfer patient to RN queue for triage    [facility]    LOV: 9/17/2024   Last Refill date: 10/7/24  Next Scheduled appointment:   Future Appointments   Date Time Provider Department Center   11/11/2024 10:15 AM Lolis Szymanski, PT  PHYS  [facility]   11/14/2024 11:00 AM Lolis Szymanski, PT  PHYS [facility]   12/5/2024 10:30 AM Paulo Kearney MD [email or web address]

## 2024-11-11 NOTE — PROGRESS NOTES
Diagnosis:   Primary osteoarthritis of left knee (M17.12)      Referring Provider: Laurent Patterson  Date of Evaluation:    11/7/24    Precautions:  None Next MD visit:   none scheduled  Date of Surgery: n/a   Insurance Primary/Secondary: BLUE CROSS MEDICAID / N/A     # Auth Visits: 4            Subjective: have been trying to get my exercise done .  Its hard to do shaquille as I am watching my grand kids     Pain: 4/10      Objective: seen for first treatment.   Hip Knee Foot/Ankle   Flexion: R 4/5; L 4-/5  Extension: R 3/5; L 3/5  Abduction: R 3/5; L 3/5  ER: R 3/5; L 3/5  IR: R 3/5; L 3/5 Flexion: R 4/5; L 4/5  Extension: R 4/5; L 4/5    DF: R 4/5; L 4/5  PF: R 4/5; L 4/5  INV: R 4/5; L 4/5  EV: R 4/5; L 4/5  Great toe ext: R 4/5; L 4/5         Assessment: Focus on treatment will be on CKC, HEP with functional emphasis for bilateral LE strength.  She has a limited amount of authorized visits, and continues to be below baseline for functional activities.       Goals:   Pt will improve knee extension ROM to 0 deg to allow proper heel strike during gait and terminal knee extension in stance   Pt will improve knee AROM flexion to >120 degrees to improve ability to perform stairs in reciprocal manner ,  car transfers   Pt will improve quad strength to 5/5 to ascend 1 flight of stairs reciprocally without UE assist   Pt will increase hip and knee strength to grossly 4+/5 to be able to get up and down from the floor safely   Pt will demonstrate increased hip ER/ABD strength to 5-/5 to perform stepping and squatting activities without excessive femoral IR/ADD   Pt will improve SLS to > 10s to improve safety with gait on uneven surfaces such as grass and gravel  Pt will be independent and compliant with comprehensive HEP to maintain progress achieved in PT     Plan: Progress as outline in POC  Date: 11/11/2024  TX#: 2/4 Date:                 TX#: 3/ Date:                 TX#: 4/ Date:                 TX#: 5/ Date:   Tx#:  6/   Bike x5'        TE:   SAQ x20 2# bilaterally   LAQ x20 b   BW red TC x10   Side step x10 red TC   Squat>heel raise x10 airex FT   Step up >over>down 4\" bilaterally LEs                      HEP: Access Code: [email or web address]  URL: [email or web address]  Date: 11/08/2024  Prepared by: Lolis Szymanski    Exercises  - Clamshell  - 1 x daily - 7 x weekly - 1 sets - 10 reps  - Supine Bridge  - 1 x daily - 7 x weekly - 1 sets - 10 reps  - Supine Active Straight Leg Raise  - 1 x daily - 7 x weekly - 1 sets - 10 reps  - Seated Long Arc Quad  - 1 x daily - 7 x weekly - 2 sets - 10 reps  - Single Leg Stance with Support  - 1 x daily - 7 x weekly - 1 sets - 10 reps  - Heel Raises with Counter Support  - 1 x daily - 7 x weekly - 1 sets - 10 reps  - Forward Step Down with Counter Support at Side  - 1 x daily - 7 x weekly - 1 sets - 10 reps  - Forward Step Up  - 1 x daily - 7 x weekly - 1 sets - 10 reps    Charges: 3 TE        Total Timed Treatment: 45 min  Total Treatment Time: 45 min

## 2024-11-14 ENCOUNTER — OFFICE VISIT (OUTPATIENT)
Dept: PHYSICAL THERAPY | Facility: HOSPITAL | Age: 59
End: 2024-11-14
Attending: INTERNAL MEDICINE
Payer: MEDICAID

## 2024-11-14 PROCEDURE — 97110 THERAPEUTIC EXERCISES: CPT | Performed by: PHYSICAL THERAPIST

## 2024-11-14 NOTE — PROGRESS NOTES
Diagnosis:   Primary osteoarthritis of left knee (M17.12)      Referring Provider: Laurent Patterson  Date of Evaluation:    11/7/24    Precautions:  None Next MD visit:   none scheduled  Date of Surgery: n/a   Insurance Primary/Secondary: BLUE CROSS MEDICAID / N/A     # Auth Visits: 4            Subjective: think I am going to get a cortisone shot in my left knee next week. Hope it will help .  Trying to do my exercise at home .   Pain: 4/10      Objective: seen for  treatment.   Hip Knee Foot/Ankle   Flexion: R 4/5; L 4-/5  Extension: R 3/5; L 3/5  Abduction: R 3/5; L 3/5  ER: R 3/5; L 3/5  IR: R 3/5; L 3/5 Flexion: R 4/5; L 4/5  Extension: R 4/5; L 4/5    DF: R 4/5; L 4/5  PF: R 4/5; L 4/5  INV: R 4/5; L 4/5  EV: R 4/5; L 4/5  Great toe ext: R 4/5; L 4/5         Assessment: Needs overall bilateral LE strength emphasis , shaquille with both open and close chain exercise , including LAQ, SAQ, balance and shuttle/functional activities. Needs cues to perform exercise with avoiding genu valgus positions .     Goals:   Pt will improve knee extension ROM to 0 deg to allow proper heel strike during gait and terminal knee extension in stance   Pt will improve knee AROM flexion to >120 degrees to improve ability to perform stairs in reciprocal manner ,  car transfers   Pt will improve quad strength to 5/5 to ascend 1 flight of stairs reciprocally without UE assist   Pt will increase hip and knee strength to grossly 4+/5 to be able to get up and down from the floor safely   Pt will demonstrate increased hip ER/ABD strength to 5-/5 to perform stepping and squatting activities without excessive femoral IR/ADD   Pt will improve SLS to > 10s to improve safety with gait on uneven surfaces such as grass and gravel  Pt will be independent and compliant with comprehensive HEP to maintain progress achieved in PT     Plan: Progress as outline in POC  Date: 11/11/2024  TX#: 2/4 Date: 11/14/2024                TX#: 3/ Date:                  TX#: 4/ Date:                 TX#: 5/ Date:   Tx#: 6/   Bike x5'  Bike x5'       TE:   SAQ x20 2# bilaterally   LAQ x20 b   BW red TC x10   Side step x10 red TC   Squat>heel raise x10 airex FT   Step up >over>down 4\" bilaterally LEs  SAQ x20 3# bilaterally   LAQ x20 b 3#    Shuttle x20 With ball   56 # \"  single leg x20 each     BW red TC x10   Side steps x10   Squat>heel raise x10 airex FT                       HEP: Access Code: [email or web address]  URL: [email or web address]  Date: 11/08/2024  Prepared by: Lolis Szymanski    Exercises  - Clamshell  - 1 x daily - 7 x weekly - 1 sets - 10 reps  - Supine Bridge  - 1 x daily - 7 x weekly - 1 sets - 10 reps  - Supine Active Straight Leg Raise  - 1 x daily - 7 x weekly - 1 sets - 10 reps  - Seated Long Arc Quad  - 1 x daily - 7 x weekly - 2 sets - 10 reps  - Single Leg Stance with Support  - 1 x daily - 7 x weekly - 1 sets - 10 reps  - Heel Raises with Counter Support  - 1 x daily - 7 x weekly - 1 sets - 10 reps  - Forward Step Down with Counter Support at Side  - 1 x daily - 7 x weekly - 1 sets - 10 reps  - Forward Step Up  - 1 x daily - 7 x weekly - 1 sets - 10 reps    Charges: 3 TE        Total Timed Treatment: 45 min  Total Treatment Time: 45 min

## 2024-11-15 RX ORDER — ZOLPIDEM TARTRATE 10 MG/1
10 TABLET ORAL NIGHTLY PRN
Qty: 30 TABLET | Refills: 0 | Status: SHIPPED | OUTPATIENT
Start: 2024-11-15

## 2024-11-15 NOTE — TELEPHONE ENCOUNTER
Please review. Protocol Failed; No Protocol      Recent fills: 6/14/2024, 8/20/2024, 10/7/2024  Last Rx written: 10/7/2024  Last office visit: 9/17/2024          Requested Prescriptions   Pending Prescriptions Disp Refills    zolpidem 10 MG Oral Tab 30 tablet 0     Sig: Take 1 tablet (10 mg total) by mouth nightly as needed.       Controlled Substance Medication Failed - 11/15/2024  8:41 AM        Failed - This medication is a controlled substance - forward to provider to refill               Future Appointments         Provider Department Appt Notes    In 2 weeks Laurent Patterson PA-C [facility], [address] [place] [address] injection    In 2 weeks Pualo Kearney MD [facility], [facility] EP FU FROM 3/20/24 SURGERY, ROBOTIC LOW ANTERIOR COLON RESECTION          Recent Outpatient Visits              Yesterday     [facility] Physical Therapy Lolis Szymanski, PT    Office Visit    4 days ago     [facility] Physical Therapy Lolis Szymanski, PT    Office Visit    1 week ago Primary osteoarthritis of left knee    [facility] Physical Therapy Lolis Szymanski, PT    Office Visit    3 weeks ago Primary osteoarthritis of left knee    [facility], [address] [place] [address] Laurent Patterson PA-C    Office Visit    1 month ago Primary osteoarthritis of left knee    [facility], [address] [place] [address] Laurent Patterson PA-C    Office Visit

## 2024-12-03 ENCOUNTER — OFFICE VISIT (OUTPATIENT)
Dept: ORTHOPEDICS CLINIC | Facility: CLINIC | Age: 59
End: 2024-12-03
Payer: MEDICAID

## 2024-12-03 VITALS — WEIGHT: 178 LBS | HEIGHT: 67 IN | BODY MASS INDEX: 27.94 KG/M2

## 2024-12-03 DIAGNOSIS — L60.0 ONYCHOCRYPTOSIS: ICD-10-CM

## 2024-12-03 DIAGNOSIS — M79.674 PAIN OF TOE OF RIGHT FOOT: Primary | ICD-10-CM

## 2024-12-03 DIAGNOSIS — M20.10 ACQUIRED HALLUX INTERPHALANGEUS, UNSPECIFIED LATERALITY: ICD-10-CM

## 2024-12-03 DIAGNOSIS — E11.9 TYPE 2 DIABETES MELLITUS WITHOUT COMPLICATION, WITHOUT LONG-TERM CURRENT USE OF INSULIN (HCC): ICD-10-CM

## 2024-12-03 PROCEDURE — 99213 OFFICE O/P EST LOW 20 MIN: CPT | Performed by: PODIATRIST

## 2024-12-03 NOTE — PROGRESS NOTES
[facility] Progress Note    Subjective:     The patient is here with continued problems about the right great toe and specifically the nail.        Objective:     Hallux interphalangeus with a little elevation in the of the toe/nail at the DIP joint level  No signs of infection        Imaging: No        Assessment/Plan:     Diagnoses and all orders for this visit:    Pain of toe of right foot    Acquired hallux interphalangeus, unspecified laterality    Type 2 diabetes mellitus without complication, without long-term current use of insulin (HCC)    Onychocryptosis        Going to see Dr Dudley in [place] closer to her home  Ingrown nail procedure  We discussed position of her toe and hallux interphalangeus - accentuates the nail rubbing    Post op shoe as regular shoe hurts too much          Barbara Inman, MPodiatric Surgery  [facility]  [address] [place] [address], [address], [place] [address] [address] [place], [place] [address] [place] [email or web address]  [email or web address]  t: [phone]   f: [phone]            Dragon speech recognition software was used to prepare this note. If a word or phrase is confusing, it is likely do to a failure of recognition. Please contact me with any questions or clarifications.

## 2024-12-04 ENCOUNTER — TELEPHONE (OUTPATIENT)
Dept: PODIATRY CLINIC | Facility: CLINIC | Age: 59
End: 2024-12-04

## 2024-12-04 ENCOUNTER — OFFICE VISIT (OUTPATIENT)
Dept: ORTHOPEDICS CLINIC | Facility: CLINIC | Age: 59
End: 2024-12-04
Payer: MEDICAID

## 2024-12-04 ENCOUNTER — TELEPHONE (OUTPATIENT)
Dept: ORTHOPEDICS CLINIC | Facility: CLINIC | Age: 59
End: 2024-12-04

## 2024-12-04 DIAGNOSIS — M17.12 PRIMARY OSTEOARTHRITIS OF LEFT KNEE: ICD-10-CM

## 2024-12-04 PROCEDURE — 99212 OFFICE O/P EST SF 10 MIN: CPT | Performed by: PHYSICIAN ASSISTANT

## 2024-12-04 RX ORDER — METHYLPREDNISOLONE 4 MG/1
TABLET ORAL
Qty: 21 EACH | Refills: 0 | Status: SHIPPED | OUTPATIENT
Start: 2024-12-04

## 2024-12-04 NOTE — TELEPHONE ENCOUNTER
Called patient and she was seen by Dr Inman on 12/3 for right hallux pain and was told she has ingrown toenail and to see Dr Dudley. Appointment scheduled on 12/10 at 845am with Dr Dudley. Address provided. She had no further concerns.

## 2024-12-04 NOTE — TELEPHONE ENCOUNTER
Patient called referred to Dr. Dudley, for right big toe pain. Offered 1/14/25 appointment, patient would like to be seen sooner due to pain. Please call back.

## 2024-12-04 NOTE — PROGRESS NOTES
[facility] Progress Note    Subjective: Patient arrives back to clinic for reassessment of the left knee.  She had initially intended to receive a corticosteroid injection into the left knee, however given that the most recent corticosteroid injection was administered on 9/16/2024, she is too early to receive an injection.  She feels that the Synvisc series failed to offer significant relief.  She also has issues with the right foot for which she will be seeing Dr. Dudley.    Objective: Patient ambulating with a walker in clinic today.  Alert and oriented.  No acute distress.  Left knee without any redness, warmth, or effusion noted.    Assessment/Plan: Patient with known underlying severe osteoarthritis of the left knee patellofemoral region with pain refractory to viscosupplementation, still too soon to perform corticosteroid injection. We discussed Zilretta as a potential option to provide longer lasting relief which the patient expressed interest in. This has been ordered.  In the meantime, I sent a Medrol Dosepak to her pharmacy for her to take in the interim while awaiting Zilretta injection into the left knee.  Her questions were sought and answered satisfactorily.  She is happy with the plan will follow as advised.      Laurent Patterson PA-C  [facility] Orthopedic Surgery    This note was dictated using Dragon software.  While it was briefly proofread prior to completion, some grammatical, spelling, and word choice errors due to dictation may still occur.

## 2024-12-05 ENCOUNTER — OFFICE VISIT (OUTPATIENT)
Facility: LOCATION | Age: 59
End: 2024-12-05
Payer: MEDICAID

## 2024-12-05 VITALS
OXYGEN SATURATION: 97 % | RESPIRATION RATE: 20 BRPM | HEART RATE: 68 BPM | TEMPERATURE: 98 F | DIASTOLIC BLOOD PRESSURE: 68 MMHG | SYSTOLIC BLOOD PRESSURE: 104 MMHG

## 2024-12-05 DIAGNOSIS — Z87.19 HISTORY OF DIVERTICULITIS: Primary | ICD-10-CM

## 2024-12-05 PROCEDURE — 99212 OFFICE O/P EST SF 10 MIN: CPT | Performed by: STUDENT IN AN ORGANIZED HEALTH CARE EDUCATION/TRAINING PROGRAM

## 2024-12-10 ENCOUNTER — OFFICE VISIT (OUTPATIENT)
Facility: LOCATION | Age: 59
End: 2024-12-10
Payer: MEDICAID

## 2024-12-10 VITALS — SYSTOLIC BLOOD PRESSURE: 112 MMHG | DIASTOLIC BLOOD PRESSURE: 80 MMHG

## 2024-12-10 DIAGNOSIS — M79.674 PAIN AROUND TOENAIL, RIGHT FOOT: ICD-10-CM

## 2024-12-10 DIAGNOSIS — B35.1 ONYCHOMYCOSIS: ICD-10-CM

## 2024-12-10 DIAGNOSIS — L60.3 NAIL DYSTROPHY: Primary | ICD-10-CM

## 2024-12-10 PROCEDURE — 99203 OFFICE O/P NEW LOW 30 MIN: CPT | Performed by: PODIATRIST

## 2024-12-10 PROCEDURE — 11730 AVULSION NAIL PLATE SIMPLE 1: CPT | Performed by: PODIATRIST

## 2024-12-10 PROCEDURE — 87101 SKIN FUNGI CULTURE: CPT | Performed by: PODIATRIST

## 2024-12-10 NOTE — PROGRESS NOTES
Per verbal orders from Dr. Dudley apply post-op shoe to patient's right foot. DME was applied without incident and fit patient comfortably. Jo Ann EPSTEIN RN

## 2024-12-10 NOTE — PROGRESS NOTES
Follow Up Visit Note       Active Problems      1. History of diverticulitis          Chief Complaint   Chief Complaint   Patient presents with    [facility]     EP- 3/20/24 ROBOTIC LOW ANTERIOR COLON RESECTION- denies new concerns        History of Present Illness  This is a very nice 59-year-old female who returns for follow-up today after undergoing elective robotic low anterior colon resection for recurrent/smoldering diverticulitis 3/20/2024.    Patient had an episode of left-sided abdominal pain around 2 months ago which prompted her to schedule a follow-up appointment with me.  She did not seek any urgent medical care at the time when she was experiencing the pain.  The pain completely resolved.  She recalls having nausea with the pain.  She denied any vomiting or fevers.  She is currently eating well and having regular bowel movements.      Allergies  Sandra is allergic to seasonal.    Past Medical / Surgical / Social / Family History    The past medical and past surgical history have been reviewed by me today.    Past Medical History:    Diabetes (HCC)    Diverticulosis of large intestine    Esophageal reflux    Essential hypertension    High blood pressure    High cholesterol    Osteoarthritis    Pneumonia due to organism    Stroke (HCC)    2008    Visual impairment    READING GLASSES     Past Surgical History:   Procedure Laterality Date    Colectomy  03/20/2024    ROBOTIC LOW ANTERIOR COLON RESECTION    Colonoscopy N/A 09/07/2023    Procedure: COLONOSCOPY;  Surgeon: Evelyn Coles MD;  Location:  ENDOSCOPY    Exploratory of abdomen      Knee replacement surgery         The family history and social history have been reviewed by me today.    Family History   Problem Relation Age of Onset    Heart Disorder Father     Cancer Mother     Hypertension Mother      Social History     Socioeconomic History    Marital status: Legally    Tobacco Use    Smoking status: Every Day     Current  packs/day: 0.10     Types: Cigarettes    Smokeless tobacco: Never    Tobacco comments:     1-3 cigarettes a day      Updated 10/8/24   Vaping Use    Vaping status: Never Used   Substance and Sexual Activity    Alcohol use: Yes     Comment: occasional    Drug use: Never        Current Outpatient Medications:     methylPREDNISolone 4 MG Oral Tablet Therapy Pack, Take as directed, Disp: 21 each, Rfl: 0    zolpidem 10 MG Oral Tab, Take 1 tablet (10 mg total) by mouth nightly as needed. (Patient not taking: Reported on 12/10/2024), Disp: 30 tablet, Rfl: 0    albuterol 108 (90 Base) MCG/ACT Inhalation Aero Soln, Inhale 2 puffs into the lungs 4 (four) times daily as needed., Disp: 1 each, Rfl: 0    amLODIPine 5 MG Oral Tab, TAKE 1 TABLET(5 MG) BY MOUTH DAILY, Disp: 90 tablet, Rfl: 3    atorvastatin 40 MG Oral Tab, Take 1 tablet (40 mg total) by mouth nightly., Disp: 90 tablet, Rfl: 3    gabapentin 100 MG Oral Cap, Take 1 capsule (100 mg total) by mouth 3 (three) times daily. (Patient not taking: Reported on 12/10/2024), Disp: 90 capsule, Rfl: 0    HYDROcodone-acetaminophen (NORCO) 5-325 MG Oral Tab, Take 1 tablet by mouth every 6 (six) hours as needed for Pain., Disp: 15 tablet, Rfl: 0    ergocalciferol 1.25 MG (67258 UT) Oral Cap, Take 1 capsule (50,000 Units total) by mouth once a week., Disp: 6 capsule, Rfl: 0    metFORMIN  MG Oral Tablet 24 Hr, Take 1 tablet (500 mg total) by mouth daily., Disp: 90 tablet, Rfl: 3    docusate sodium 100 MG Oral Cap, Take 1 capsule (100 mg total) by mouth 2 (two) times daily. (Patient not taking: Reported on 12/10/2024), Disp: 60 capsule, Rfl: 2    Triamterene-HCTZ 37.5-25 MG Oral Tab, Take 1 tablet by mouth daily. (Patient not taking: Reported on 12/10/2024), Disp: 90 tablet, Rfl: 0     Review of Systems  A 10 point review of systems was performed and negative unless otherwise documented per HPI.     Physical Findings   /68   Pulse 68   Temp 97.6 °F (36.4 °C) (Temporal)    Resp 20   SpO2 97%   Physical Exam  Vitals and nursing note reviewed. Exam conducted with a chaperone present.   Constitutional:       General: She is not in acute distress.  HENT:      Head: Normocephalic and atraumatic.      Mouth/Throat:      Mouth: Mucous membranes are moist.   Cardiovascular:      Rate and Rhythm: Normal rate and regular rhythm.   Pulmonary:      Effort: Pulmonary effort is normal.   Abdominal:      General: There is no distension.      Palpations: Abdomen is soft.      Tenderness: There is no abdominal tenderness.      Hernia: No hernia is present.      Comments: Well-healed laparoscopic and Pfannenstiel scars   Musculoskeletal:         General: No deformity.   Skin:     General: Skin is warm and dry.   Neurological:      General: No focal deficit present.      Mental Status: She is alert.   Psychiatric:         Mood and Affect: Mood normal.          Assessment   1. History of diverticulitis      This is a very nice 59-year-old female who returns for follow-up today after undergoing elective robotic low anterior colon resection for recurrent/smoldering diverticulitis 3/20/2024.    Patient had an episode of left-sided abdominal pain around 2 months ago which prompted her to schedule a follow-up appointment with me.  She did not seek any urgent medical care at the time when she was experiencing the pain.  The pain completely resolved.  She recalls having nausea with the pain.  She denied any vomiting or fevers.  She is currently eating well and having regular bowel movements.    Patient appears well on exam today.  She has no abdominal tenderness.  She has well-healed surgical scars.    Plan   I counseled patient that I am unsure what caused her transient left-sided abdominal pain 2 months ago.  I do not think any further work-up is needed at this time as the patient is now completely asymptomatic.  If the abdominal pain recurs, I advised patient to contact my office and I will either order a  stat CT scan of the abdomen and pelvis or send the patient to the emergency room for further evaluation and treatment.    No bathing, dietary or activity restrictions.  No further follow-up scheduled, but patient is welcome to see me at anytime in the future with any surgical questions or concerns.     No orders of the defined types were placed in this encounter.      Imaging & Referrals   None    Follow Up  No follow-ups on file.    Paulo Kearney MD

## 2024-12-10 NOTE — PROGRESS NOTES
[facility]  Progress Note      Sandra Roche is a 59 year old female.   Chief Complaint   Patient presents with    Toe Pain     Consult - right hallux - throbbing pain - nail appears like it might be ingrowing, also has a crack down middle - very sensitive to touch, ok with no show on, but otherwise 9-10/10         HPI:     Patient is a pleasant 59-year-old female who is presenting to clinic today with complaints of right great toe pain.  Patient states that she has a throbbing pain around the toenail, which has been ongoing for a while.  She states that there is pain with pressure in shoe gear as well.  She states is very sensitive to touch, and rates the pain 9-10/10.  She was seen by Dr. Inman, who referred patient to me for further evaluation and options.  No recent SOIs.  No other concerns.      Allergies: Seasonal   Current Outpatient Medications   Medication Sig Dispense Refill    methylPREDNISolone 4 MG Oral Tablet Therapy Pack Take as directed 21 each 0    albuterol 108 (90 Base) MCG/ACT Inhalation Aero Soln Inhale 2 puffs into the lungs 4 (four) times daily as needed. 1 each 0    amLODIPine 5 MG Oral Tab TAKE 1 TABLET(5 MG) BY MOUTH DAILY 90 tablet 3    atorvastatin 40 MG Oral Tab Take 1 tablet (40 mg total) by mouth nightly. 90 tablet 3    HYDROcodone-acetaminophen (NORCO) 5-325 MG Oral Tab Take 1 tablet by mouth every 6 (six) hours as needed for Pain. 15 tablet 0    ergocalciferol 1.25 MG (50283 UT) Oral Cap Take 1 capsule (50,000 Units total) by mouth once a week. 6 capsule 0    metFORMIN  MG Oral Tablet 24 Hr Take 1 tablet (500 mg total) by mouth daily. 90 tablet 3    zolpidem 10 MG Oral Tab Take 1 tablet (10 mg total) by mouth nightly as needed. (Patient not taking: Reported on 12/10/2024) 30 tablet 0    gabapentin 100 MG Oral Cap Take 1 capsule (100 mg total) by mouth 3 (three) times daily. (Patient not taking: Reported on 12/10/2024) 90 capsule 0    docusate sodium 100 MG Oral  Cap Take 1 capsule (100 mg total) by mouth 2 (two) times daily. (Patient not taking: Reported on 12/10/2024) 60 capsule 2    Triamterene-HCTZ 37.5-25 MG Oral Tab Take 1 tablet by mouth daily. (Patient not taking: Reported on 12/10/2024) 90 tablet 0      Past Medical History:    Diabetes (HCC)    Diverticulosis of large intestine    Esophageal reflux    Essential hypertension    High blood pressure    High cholesterol    Osteoarthritis    Pneumonia due to organism    Stroke (HCC)    2008    Visual impairment    READING GLASSES      Past Surgical History:   Procedure Laterality Date    Colectomy  03/20/2024    ROBOTIC LOW ANTERIOR COLON RESECTION    Colonoscopy N/A 09/07/2023    Procedure: COLONOSCOPY;  Surgeon: Evelyn Coles MD;  Location:  ENDOSCOPY    Exploratory of abdomen      Knee replacement surgery        Family History   Problem Relation Age of Onset    Heart Disorder Father     Cancer Mother     Hypertension Mother       Social History     Socioeconomic History    Marital status: Legally    Tobacco Use    Smoking status: Every Day     Current packs/day: 0.10     Types: Cigarettes    Smokeless tobacco: Never    Tobacco comments:     1-3 cigarettes a day      Updated 10/8/24   Vaping Use    Vaping status: Never Used   Substance and Sexual Activity    Alcohol use: Yes     Comment: occasional    Drug use: Never           REVIEW OF SYSTEMS:     10 point ROS completed and was negative unless stated in HPI.      EXAM:     GENERAL: well developed, well nourished, in no apparent distress  EXTREMITIES:  1. Integument: Right hallux toenail is thickened, elongated, discolored, dystrophic.  Skin appears moist, warm, and supple with positive hair growth. There are no color changes. No open lesions. No macerations. No Hyperkeratotic lesions.   2. Vascular: Dorsalis pedis 2/4 bilateral and posterior tibial pulses 2/4 bilateral, capillary refill normal.  3. Neurological: Gross sensation intact via light  touch bilaterally.  Normal sharp/dull sensation  4. Musculoskeletal: Pain with direct palpation to right hallux toenail.  There is no pain with palpation to bilateral borders of right hallux toenail       ASSESSMENT AND PLAN:   Diagnoses and all orders for this visit:    Nail dystrophy    Onychomycosis    Pain around toenail, right foot        Plan:   -Patient was seen and evaluated today in clinic.  Chart history reviewed.    -Reviewed treatment options for nail dystrophy / onychomycosis including:   -No treatment and monitoring, nail debridement, topical meds, oral meds, and nail avulsion.  -Advantages and disadvantages of each reviewed. Using oral agents would require regular blood test monitoring due to risk of hepatotoxicity and other adverse reactions including drug interactions.   -Topical meds are much safer yet carry very low efficacy.  -Patient can also try home remedies such as soaking their feet in warm water and apple cider vinegar for 10 minutes a day, as well as utilizing Vicks vapor rub to the top of the nails daily.  -If patient is going to move forward with medication, I am recommending we send a sample of the affected toenail(s) to confirm that it is toenail fungus.  -Pt has opted for total nail avulsion today to attempt to \"reset\" the toenail.  She would also like the nail sent to be evaluated for fungus.  This was done today.  Will discuss oral Lamisil pending culture findings.    -The procedure was discussed in detail and consent was signed.    Procedure: Total nail avulsion of right hallux toenail (CPT: 92609)  After prepping site with betadine, administered local infiltrative block to right hallux consisting of 5 cc of 1:1 mixture of 0.5% Marcaine plain and 1% lidocaine plain.  After sufficient anesthesia was achieved, the digit was prepped with Betadine.  Next, using a hemostat, bilateral borders and proximal nail fold of the right hallux nail were freed.  A hemostat was once again used to  remove the nail in its entirety.  The site was then copiously irrigated with saline and patted dry.  The site was dressed with bacitracin, Adaptic, gauze, and mildly compressive Coban wrap.  The patient tolerated the procedure well and there were no complications.      -All soaking and aftercare instructions were discussed with the patient.  Informational handout was dispensed.    -Educated patient on acute signs of infection advised patient to seek immediate medical attention if any concerns arise.      -All of the patient's questions and concerns were addressed.  They indicated their understanding of these issues and agrees to the plan.      RTC 3 weeks with Joanne Dudley DPM        12/10/2024    [address] [address]   [email or web address]            [employer] speech recognition software was used to prepare this note.  Errors in word recognition may occur.  Please contact me with any questions/concerns with this note.

## 2024-12-13 ENCOUNTER — HOSPITAL ENCOUNTER (OUTPATIENT)
Age: 59
Discharge: HOME | End: 2024-12-13
Attending: EMERGENCY MEDICINE
Payer: MEDICAID

## 2024-12-13 VITALS
HEART RATE: 78 BPM | OXYGEN SATURATION: 99 % | BODY MASS INDEX: 28.25 KG/M2 | DIASTOLIC BLOOD PRESSURE: 78 MMHG | TEMPERATURE: 97 F | HEIGHT: 67 IN | RESPIRATION RATE: 18 BRPM | SYSTOLIC BLOOD PRESSURE: 128 MMHG | WEIGHT: 180 LBS

## 2024-12-13 DIAGNOSIS — J01.00 ACUTE NON-RECURRENT MAXILLARY SINUSITIS: Primary | ICD-10-CM

## 2024-12-13 PROCEDURE — 99213 OFFICE O/P EST LOW 20 MIN: CPT

## 2024-12-13 NOTE — ED INITIAL ASSESSMENT (HPI)
Pt complaining of sinus congestion, drainage and sneezing for greater than a week and a half. Using Flonase. States she thinks she needs an antibiotic.

## 2024-12-16 ENCOUNTER — OFFICE VISIT (OUTPATIENT)
Facility: CLINIC | Age: 59
End: 2024-12-16
Payer: MEDICAID

## 2024-12-16 VITALS — HEIGHT: 67 IN | BODY MASS INDEX: 28.25 KG/M2 | WEIGHT: 180 LBS

## 2024-12-16 DIAGNOSIS — M17.12 PRIMARY OSTEOARTHRITIS OF LEFT KNEE: Primary | ICD-10-CM

## 2024-12-16 NOTE — PROCEDURES
Risks and benefits of knee injection discussed with the patient, with risks including but not limited to pain and swelling at the injection site and/or within the knee joint, infection, elevation in blood pressure and/or glucose levels, facial flushing. After informed consent, the patient's left knee was marked, locally anesthetized with skin refrigerant, prepped with topical antiseptic, and injected with 5mL of 32mg/5mL Zilretta through the inferolateral portal.  A band-aid was applied.  The patient tolerated the procedure well.    Laurent Patterson PA-C  [facility]

## 2024-12-18 ENCOUNTER — TELEPHONE (OUTPATIENT)
Dept: INTERNAL MEDICINE CLINIC | Facility: CLINIC | Age: 59
End: 2024-12-18

## 2024-12-18 NOTE — TELEPHONE ENCOUNTER
Patient called stating she scheduled an appointment for 12/23, she needs to arrange transportation and noted that the insurance/transportation company may call to make sure patient does need this appointment.     Appointment is a follow up from  on 12/13 and patient states she does not feel better.     FYI to PSR

## 2024-12-18 NOTE — ED PROVIDER NOTES
Patient Seen in: [facility]      History     Chief Complaint   Patient presents with    Sinusitis     Stated Complaint: sinus issues    Subjective:   HPI      60 yo female with 10 days of head congestion, sinus pressure, post nasal drip. Taking flonase without much relief. No fever.     Objective:     No pertinent past medical history.            No pertinent past surgical history.              No pertinent social history.            Review of Systems    Positive for stated complaint: sinus issues  Other systems are as noted in HPI.  Constitutional and vital signs reviewed.      All other systems reviewed and negative except as noted above.    Physical Exam     ED Triage Vitals [12/13/24 1021]   /78   Pulse 78   Resp 18   Temp 97.4 °F (36.3 °C)   Temp src Oral   SpO2 99 %   O2 Device None (Room air)       Current Vitals:   No data recorded    Physical Exam  Vitals and nursing note reviewed.   Constitutional:       Appearance: Normal appearance. She is well-developed.   HENT:      Head: Normocephalic and atraumatic.      Nose: Congestion present.      Mouth/Throat:      Mouth: Mucous membranes are moist.      Pharynx: Oropharynx is clear.   Cardiovascular:      Rate and Rhythm: Normal rate and regular rhythm.   Pulmonary:      Effort: Pulmonary effort is normal. No respiratory distress.   Musculoskeletal:      Cervical back: Neck supple.   Lymphadenopathy:      Cervical: Cervical adenopathy present.   Skin:     General: Skin is warm and dry.      Capillary Refill: Capillary refill takes less than 2 seconds.   Neurological:      General: No focal deficit present.      Mental Status: She is alert.   Psychiatric:         Mood and Affect: Mood normal.         Behavior: Behavior normal.            ED Course   Labs Reviewed - No data to display                MDM             Medical Decision Making  Viral vs bacterial sinusitis. Timeframe and worsening symptoms concerning for bacterial etiology.  Discharge on augmentin. Continue flonase.     Disposition and Plan     Clinical Impression:  1. Acute non-recurrent maxillary sinusitis         Disposition:  Discharge  12/13/2024 10:56 am    Follow-up:  Mariaelena Valle DO  [address] [place] [id number]  [phone]      As needed          Medications Prescribed:  Discharge Medication List as of 12/13/2024 11:04 AM        START taking these medications    Details   amoxicillin clavulanate 875-125 MG Oral Tab Take 1 tablet by mouth 2 (two) times daily for 7 days., Normal, Disp-14 tablet, R-0                 Supplementary Documentation:

## 2024-12-19 DIAGNOSIS — G47.00 INSOMNIA, UNSPECIFIED TYPE: ICD-10-CM

## 2024-12-23 ENCOUNTER — OFFICE VISIT (OUTPATIENT)
Dept: INTERNAL MEDICINE CLINIC | Facility: CLINIC | Age: 59
End: 2024-12-23
Payer: MEDICAID

## 2024-12-23 VITALS
OXYGEN SATURATION: 98 % | TEMPERATURE: 98 F | HEIGHT: 67 IN | WEIGHT: 183.38 LBS | BODY MASS INDEX: 28.78 KG/M2 | DIASTOLIC BLOOD PRESSURE: 70 MMHG | SYSTOLIC BLOOD PRESSURE: 110 MMHG | HEART RATE: 79 BPM

## 2024-12-23 DIAGNOSIS — J32.9 SINUSITIS, UNSPECIFIED CHRONICITY, UNSPECIFIED LOCATION: Primary | ICD-10-CM

## 2024-12-23 RX ORDER — PREDNISONE 20 MG/1
20 TABLET ORAL DAILY
Qty: 5 TABLET | Refills: 0 | Status: SHIPPED | OUTPATIENT
Start: 2024-12-23 | End: 2024-12-28

## 2024-12-23 RX ORDER — DOXYCYCLINE 100 MG/1
100 CAPSULE ORAL 2 TIMES DAILY
Qty: 14 CAPSULE | Refills: 0 | Status: SHIPPED | OUTPATIENT
Start: 2024-12-23 | End: 2024-12-30

## 2024-12-23 NOTE — PROGRESS NOTES
Sandra Roche is a 59 year old female.   Chief Complaint   Patient presents with    Sinus Problem     Room 16B, [facility], pt has sinus problems, went to [facility] 2 weeks ago, finished antibiotics, don't feel good, especially at night.    Chest Congestion     HPI:    Patient here today for urgent care follow up. Was prescribed Augmentin and flonase for sinusitis which she feels helped her symptoms but now 2 days after completing Augmentin her symptoms still bothersome. +sinus drainage, voice changes, facial pain/pressure. She is afebrile and tolerating po. Takes zyrtec daily.     Allergies:  Allergies[1]   Current Meds:  Current Outpatient Medications   Medication Sig Dispense Refill    albuterol 108 (90 Base) MCG/ACT Inhalation Aero Soln Inhale 2 puffs into the lungs 4 (four) times daily as needed. 1 each 0    amLODIPine 5 MG Oral Tab TAKE 1 TABLET(5 MG) BY MOUTH DAILY 90 tablet 3    atorvastatin 40 MG Oral Tab Take 1 tablet (40 mg total) by mouth nightly. 90 tablet 3    HYDROcodone-acetaminophen (NORCO) 5-325 MG Oral Tab Take 1 tablet by mouth every 6 (six) hours as needed for Pain. 15 tablet 0    ergocalciferol 1.25 MG (11269 UT) Oral Cap Take 1 capsule (50,000 Units total) by mouth once a week. 6 capsule 0    metFORMIN  MG Oral Tablet 24 Hr Take 1 tablet (500 mg total) by mouth daily. 90 tablet 3    methylPREDNISolone 4 MG Oral Tablet Therapy Pack Take as directed (Patient not taking: Reported on 12/23/2024) 21 each 0    zolpidem 10 MG Oral Tab Take 1 tablet (10 mg total) by mouth nightly as needed. (Patient not taking: Reported on 12/10/2024) 30 tablet 0    gabapentin 100 MG Oral Cap Take 1 capsule (100 mg total) by mouth 3 (three) times daily. (Patient not taking: Reported on 12/10/2024) 90 capsule 0    docusate sodium 100 MG Oral Cap Take 1 capsule (100 mg total) by mouth 2 (two) times daily. (Patient not taking: Reported on 12/10/2024) 60 capsule 2    Triamterene-HCTZ 37.5-25 MG Oral Tab Take 1 tablet by  mouth daily. (Patient not taking: Reported on 12/10/2024) 90 tablet 0        PMH:     Past Medical History:    Diabetes ([facility])    Diverticulosis of large intestine    Esophageal reflux    Essential hypertension    High blood pressure    High cholesterol    Osteoarthritis    Pneumonia due to organism    Stroke ([facility])    2008    Visual impairment    READING GLASSES       ROS:   Review of Systems   HENT:  Positive for congestion, postnasal drip, sinus pressure, sinus pain and voice change. Negative for sore throat.    Respiratory:  Positive for cough.    Cardiovascular: Negative.    Gastrointestinal: Negative.    Neurological: Negative.             PHYSICAL EXAM:    /70 (BP Location: Right arm, Patient Position: Sitting, Cuff Size: adult)   Pulse 79   Temp 97.9 °F (36.6 °C) (Tympanic)   Ht 5' 7\" (1.702 m)   Wt 183 lb 6.4 oz (83.2 kg)   SpO2 98%   BMI 28.72 kg/m²   Physical Exam  Constitutional:       Appearance: Normal appearance.   HENT:      Nose: Congestion present.      Right Turbinates: Swollen.      Left Turbinates: Swollen.      Right Sinus: Maxillary sinus tenderness present.      Left Sinus: Maxillary sinus tenderness present.   Pulmonary:      Effort: Pulmonary effort is normal.      Breath sounds: Normal breath sounds.   Skin:     General: Skin is warm and dry.      Capillary Refill: Capillary refill takes less than 2 seconds.   Neurological:      Mental Status: She is alert.        ASSESSMENT/ PLAN:   1. Sinusitis, unspecified chronicity, unspecified location  Continue antihistamine, flonase daily. Add prednisone x 5 days and doxy x 7 days. If symptoms fail to improve recommend ENT consult.       Health Maintenance Due   Topic Date Due    Tobacco Cessation Counseling  Never done    COVID-19 Vaccine (1 - 2024-25 season) Never done    Influenza Vaccine (1) Never done    Annual Physical  10/04/2024           Pt indicates understanding and agrees to the plan.     No follow-ups on file.    Latricia  PRIYANKA Mora          [1]   Allergies  Allergen Reactions    Seasonal OTHER (SEE COMMENTS)     Watery eyes, sneezing

## 2024-12-26 RX ORDER — ZOLPIDEM TARTRATE 10 MG/1
10 TABLET ORAL NIGHTLY PRN
Qty: 30 TABLET | Refills: 0 | Status: SHIPPED | OUTPATIENT
Start: 2024-12-26

## 2024-12-26 NOTE — TELEPHONE ENCOUNTER
Please review. Protocol Failed; No Protocol      Recent fills: 10/7/2024, 11/15/2024  Last Rx written: 11/15/2024  Last office visit: 9/17/2024    Recent Visits  Date Type Provider Dept   12/23/24 Office Visit Latricia Mora APRN [facility] [address]             Requested Prescriptions   Pending Prescriptions Disp Refills    ZOLPIDEM 10 MG Oral Tab [Pharmacy Med Name: ZOLPIDEM 10MG TABLETS] 30 tablet 0     Sig: TAKE 1 TABLET(10 MG) BY MOUTH EVERY NIGHT AS NEEDED       Controlled Substance Medication Failed - 12/26/2024  9:55 AM        Failed - This medication is a controlled substance - forward to provider to refill               Future Appointments         Provider Department Appt Notes    In 5 days Germain Jones APN [facility], [address] [place] [address]           Recent Outpatient Visits              3 days ago Sinusitis, unspecified chronicity, unspecified location    [facility], [address] [place] [address] Latricia Moar APRN    Office Visit    1 week ago Primary osteoarthritis of left knee    [facility], [address] [place] [address] Laurent Patterson PA-C    Office Visit    2 weeks ago Nail dystrophy    [facility], [address] [place] [address] Dustin Dudley DPM    Office Visit    3 weeks ago History of diverticulitis    [facility], [facility],Paulo Barrett MD    Office Visit    3 weeks ago Primary osteoarthritis of left knee    [facility], [address] [place], [place] [address]Laurent Boston PA-C    Office Visit

## 2024-12-31 ENCOUNTER — OFFICE VISIT (OUTPATIENT)
Facility: LOCATION | Age: 59
End: 2024-12-31
Payer: MEDICAID

## 2024-12-31 DIAGNOSIS — S91.209D AVULSION OF TOENAIL, SUBSEQUENT ENCOUNTER: Primary | ICD-10-CM

## 2024-12-31 DIAGNOSIS — E11.9 TYPE 2 DIABETES MELLITUS WITHOUT COMPLICATION, WITHOUT LONG-TERM CURRENT USE OF INSULIN (HCC): ICD-10-CM

## 2024-12-31 DIAGNOSIS — B35.1 ONYCHOMYCOSIS: ICD-10-CM

## 2024-12-31 DIAGNOSIS — L60.3 NAIL DYSTROPHY: ICD-10-CM

## 2024-12-31 PROCEDURE — 99213 OFFICE O/P EST LOW 20 MIN: CPT

## 2025-01-01 NOTE — PROGRESS NOTES
[facility]  Progress Note  12/31/2024    Sandra Roche is a 59 year old female.   Chief Complaint   Patient presents with    Ingrown Toenail     Patient is here to follow up on right hallux total nail avulsion completed on 12/10/2024. She denies any pain in the office, denies any fever or chills.         HPI:     Sandra Roche is a pleasant 59 year old female who presents to the clinic today for follow-up post total nail avulsion of right hallux toenail completed by Dr. Dudley on 12/10/2024.  Patient is also following up on fungal nail culture which was sent to the lab at the last visit.  Patient states that she has been completing foot soaks and has been covering area with antibiotic ointment and a Band-Aid.  Patient denies any pain.  States that she feels like the area is healing well.  Patient also makes mention that she has elongated and incurvated nails and has difficulty trimming on her own.  Patient does mention that she is diabetic and is on metformin.  Last hemoglobin A1c was 6.7 on 9/17/2024.  Patient denies any open sores or calluses to her feet, although patient does mention that \"years ago\" she had an open wound to her foot, but does not remember which foot or how the wound was treated.   Past medical history, medications, allergies reviewed.       Allergies: Seasonal   Current Outpatient Medications   Medication Sig Dispense Refill    zolpidem 10 MG Oral Tab Take 1 tablet (10 mg total) by mouth nightly as needed. 30 tablet 0    methylPREDNISolone 4 MG Oral Tablet Therapy Pack Take as directed 21 each 0    albuterol 108 (90 Base) MCG/ACT Inhalation Aero Soln Inhale 2 puffs into the lungs 4 (four) times daily as needed. 1 each 0    amLODIPine 5 MG Oral Tab TAKE 1 TABLET(5 MG) BY MOUTH DAILY 90 tablet 3    atorvastatin 40 MG Oral Tab Take 1 tablet (40 mg total) by mouth nightly. 90 tablet 3    gabapentin 100 MG Oral Cap Take 1 capsule (100 mg total) by mouth 3 (three) times daily. 90  capsule 0    HYDROcodone-acetaminophen (NORCO) 5-325 MG Oral Tab Take 1 tablet by mouth every 6 (six) hours as needed for Pain. 15 tablet 0    ergocalciferol 1.25 MG (22126 UT) Oral Cap Take 1 capsule (50,000 Units total) by mouth once a week. 6 capsule 0    metFORMIN  MG Oral Tablet 24 Hr Take 1 tablet (500 mg total) by mouth daily. 90 tablet 3    Triamterene-HCTZ 37.5-25 MG Oral Tab Take 1 tablet by mouth daily. 90 tablet 0    docusate sodium 100 MG Oral Cap Take 1 capsule (100 mg total) by mouth 2 (two) times daily. (Patient not taking: Reported on 12/31/2024) 60 capsule 2      Past Medical History:    Diabetes (HCC)    Diverticulosis of large intestine    Esophageal reflux    Essential hypertension    High blood pressure    High cholesterol    Osteoarthritis    Pneumonia due to organism    Stroke (HCC)    2008    Visual impairment    READING GLASSES      Past Surgical History:   Procedure Laterality Date    Colectomy  03/20/2024    ROBOTIC LOW ANTERIOR COLON RESECTION    Colonoscopy N/A 09/07/2023    Procedure: COLONOSCOPY;  Surgeon: Evelyn Coles MD;  Location:  ENDOSCOPY    Exploratory of abdomen      Knee replacement surgery        Family History   Problem Relation Age of Onset    Heart Disorder Father     Cancer Mother     Hypertension Mother       Social History     Socioeconomic History    Marital status: Legally    Tobacco Use    Smoking status: Every Day     Current packs/day: 0.10     Types: Cigarettes    Smokeless tobacco: Never    Tobacco comments:     1-3 cigarettes a day      Updated 10/8/24   Vaping Use    Vaping status: Never Used   Substance and Sexual Activity    Alcohol use: Yes     Comment: occasional    Drug use: Never           REVIEW OF SYSTEMS:     10 point ROS completed and was negative, except for pertinent positive and negatives stated in subjective.       EXAM:     GENERAL: well developed, well nourished, in no apparent distress  EXTREMITIES:  1. Integument:  Skin appears moist, warm, and supple.  Right hallux nailbed is stable and without drainage, surrounding erythema, or other signs of infection. There are no color changes. No open lesions. No macerations. No Hyperkeratotic lesions. The patient's nails appear incurvated, thickened, and elongated.       2. Vascular: Dorsalis pedis 2/4 bilateral and posterior tibial pulses 2/4 bilateral, capillary refill normal.  3. Neurological: Gross sensation intact via light touch bilaterally.  Normal sharp/dull sensation. Monofilament test with normal sensation with [place] Chas monofilament testing.   4. Musculoskeletal: All muscle groups are graded 5/5 in the foot and ankle.       ASSESSMENT AND PLAN:   Diagnoses and all orders for this visit:    Avulsion of toenail, subsequent encounter    Nail dystrophy    Onychomycosis    Type 2 diabetes mellitus without complication, without long-term current use of insulin ([facility])        Plan:   - Patient was seen and evaluated today in clinic.  Chart history reviewed.    -Patient is status post total nail avulsion right hallux.  Right hallux debrided with curette. No current signs of infection noted    -Patient can discontinue daily foot soaks, antibiotic cream, and Band-Aids at this time.  Okay for patient to utilize Band-Aid when in shoe gear for added protection.  Encouraged to allow site to air out.    -Recommend patient monitor for any signs of infection and contact our office or seek immediate medical attention if noticing any of the signs.      -Discussed with patient that fungal nail culture is currently at week 2 and does not show any fungal growth.  We will continue to monitor the fungal nail culture and contact patient if there is any growth.    - At today's visit sharply debrided nails x9 with a sterile nail nipper achieving a 20% reduction in thickness and length, without incident. Slant back procedure performed to ingrown nails. Nails further smoothed with emery board.    -  Discussed importance of proper pedal hygiene, regular foot checks, and tight glucose control by following diet and medication regimen.     - Pt was educated to keep her feet clean; ensure that she washes and dries between toes and utilizes a good moisturizing cream such as Amlactin or Urea cream.    -Educated patient that because she is diabetic that she should return to the office every 2 to 3 months for nail trim and diabetic foot checks.      - All of the patient's questions and concerns were addressed.  She indicates understanding of these issues and agrees to the plan.      Time spent reviewing pertinent information from patient's chart, reviewing any pertinent imaging, obtaining history and physical exam, discussing and mutually agreeing on a treatment plan, and documenting encounter: 30 minutes    RTC 2 to 3 months      KEZIA Bray        [facility]            Dragon speech recognition software was used to prepare this note.  Errors in word recognition may occur.  Please contact me with any questions/concerns with this note.

## 2025-02-11 DIAGNOSIS — E11.9 TYPE 2 DIABETES MELLITUS WITHOUT COMPLICATION, WITHOUT LONG-TERM CURRENT USE OF INSULIN (HCC): ICD-10-CM

## 2025-02-13 RX ORDER — METFORMIN HYDROCHLORIDE 500 MG/1
500 TABLET, EXTENDED RELEASE ORAL DAILY
Qty: 90 TABLET | Refills: 3 | Status: SHIPPED | OUTPATIENT
Start: 2025-02-13

## 2025-02-13 NOTE — TELEPHONE ENCOUNTER
Refill passed [facility] protocol.     Requested Prescriptions     Pending Prescriptions Disp Refills    METFORMIN  MG Oral Tablet 24 Hr [Pharmacy Med Name: METFORMIN ER 500MG 24HR TABS] 90 tablet 3     Sig: TAKE 1 TABLET(500 MG) BY MOUTH DAILY

## 2025-02-14 ENCOUNTER — HOSPITAL ENCOUNTER (OUTPATIENT)
Dept: ULTRASOUND IMAGING | Facility: HOSPITAL | Age: 60
Discharge: HOME | End: 2025-02-14
Attending: INTERNAL MEDICINE
Payer: MEDICAID

## 2025-02-14 DIAGNOSIS — R10.30 LOWER ABDOMINAL PAIN: ICD-10-CM

## 2025-02-14 DIAGNOSIS — R30.9 PAIN WITH URINATION: ICD-10-CM

## 2025-02-14 DIAGNOSIS — Z98.890 HISTORY OF ABDOMINAL SURGERY: ICD-10-CM

## 2025-02-14 PROCEDURE — 76857 US EXAM PELVIC LIMITED: CPT | Performed by: INTERNAL MEDICINE

## 2025-02-26 ENCOUNTER — TELEPHONE (OUTPATIENT)
Dept: INTERNAL MEDICINE CLINIC | Facility: CLINIC | Age: 60
End: 2025-02-26

## 2025-02-26 ENCOUNTER — NURSE TRIAGE (OUTPATIENT)
Dept: INTERNAL MEDICINE CLINIC | Facility: CLINIC | Age: 60
End: 2025-02-26

## 2025-02-26 DIAGNOSIS — I10 PRIMARY HYPERTENSION: ICD-10-CM

## 2025-02-26 DIAGNOSIS — E53.8 VITAMIN B12 DEFICIENCY: ICD-10-CM

## 2025-02-26 DIAGNOSIS — E78.2 MIXED HYPERLIPIDEMIA: ICD-10-CM

## 2025-02-26 DIAGNOSIS — E55.9 VITAMIN D DEFICIENCY: ICD-10-CM

## 2025-02-26 DIAGNOSIS — E11.9 TYPE 2 DIABETES MELLITUS WITHOUT COMPLICATION, WITHOUT LONG-TERM CURRENT USE OF INSULIN (HCC): Primary | ICD-10-CM

## 2025-02-26 NOTE — TELEPHONE ENCOUNTER
Action Requested: Summary for Provider     []  Critical Lab, Recommendations Needed  [] Need Additional Advice  []   FYI    []   Need Orders  [] Need Medications Sent to Pharmacy  []  Other     SUMMARY: Patient requesting a chest xray for chest heaviness and shortness of breath. Triaged patient. States chest heaviness and shortness of breath started 1 week ago.  Denies fever, no cough, no congestion. States history of pneumonia.  Advised based on symptoms patient should be seen at Urgent care for evaluation to rule out an acute cardiac event. Patient agrees with plan. Also requesting annual labs.          Reason for call: Chest Pain  Onset: x1 week        Reason for Disposition   Chest pain(s) lasting a few seconds persists > 3 days   Chest pain lasting longer than 5 minutes and occurred in last 3 days (72 hours) (Exception: Feels exactly the same as previously diagnosed heartburn and has accompanying sour taste in mouth.)    Protocols used: Chest Pain-A-[place]

## 2025-02-26 NOTE — TELEPHONE ENCOUNTER
Left voicemail to call office back regarding heaviness in chest. Mychart active, mychart message sent.

## 2025-02-26 NOTE — TELEPHONE ENCOUNTER
Spoke to patient regarding chest heaviness and shortness of breath. Patient triaged. Patient also requesting Annual lab for her upcoming visit.    35 medical assists - please assist with annual labs

## 2025-03-03 ENCOUNTER — LAB ENCOUNTER (OUTPATIENT)
Dept: LAB | Age: 60
End: 2025-03-03
Attending: INTERNAL MEDICINE
Payer: MEDICAID

## 2025-03-03 DIAGNOSIS — E11.9 TYPE 2 DIABETES MELLITUS WITHOUT COMPLICATION, WITHOUT LONG-TERM CURRENT USE OF INSULIN (HCC): ICD-10-CM

## 2025-03-03 DIAGNOSIS — E78.2 MIXED HYPERLIPIDEMIA: ICD-10-CM

## 2025-03-03 DIAGNOSIS — I10 PRIMARY HYPERTENSION: ICD-10-CM

## 2025-03-03 DIAGNOSIS — E55.9 VITAMIN D DEFICIENCY: ICD-10-CM

## 2025-03-03 DIAGNOSIS — E53.8 VITAMIN B12 DEFICIENCY: ICD-10-CM

## 2025-03-03 LAB
ALBUMIN SERPL-MCNC: 4.8 G/DL (ref 3.2–4.8)
ALBUMIN/GLOB SERPL: 1.6 {RATIO} (ref 1–2)
ALP LIVER SERPL-CCNC: 86 U/L
ALT SERPL-CCNC: <7 U/L
ANION GAP SERPL CALC-SCNC: 11 MMOL/L (ref 0–18)
AST SERPL-CCNC: 10 U/L (ref ?–34)
BASOPHILS # BLD AUTO: 0.03 X10(3) UL (ref 0–0.2)
BASOPHILS NFR BLD AUTO: 0.4 %
BILIRUB SERPL-MCNC: 0.5 MG/DL (ref 0.2–1.1)
BUN BLD-MCNC: 9 MG/DL (ref 9–23)
CALCIUM BLD-MCNC: 9.6 MG/DL (ref 8.7–10.6)
CHLORIDE SERPL-SCNC: 103 MMOL/L (ref 98–112)
CHOLEST SERPL-MCNC: 155 MG/DL (ref ?–200)
CO2 SERPL-SCNC: 27 MMOL/L (ref 21–32)
CREAT BLD-MCNC: 0.8 MG/DL
EGFRCR SERPLBLD CKD-EPI 2021: 83 ML/MIN/1.73M2 (ref 60–?)
EOSINOPHIL # BLD AUTO: 0.2 X10(3) UL (ref 0–0.7)
EOSINOPHIL NFR BLD AUTO: 2.4 %
ERYTHROCYTE [DISTWIDTH] IN BLOOD BY AUTOMATED COUNT: 13.9 %
EST. AVERAGE GLUCOSE BLD GHB EST-MCNC: 154 MG/DL (ref 68–126)
FASTING PATIENT LIPID ANSWER: YES
FASTING STATUS PATIENT QL REPORTED: YES
GLOBULIN PLAS-MCNC: 3 G/DL (ref 2–3.5)
GLUCOSE BLD-MCNC: 145 MG/DL (ref 70–99)
HBA1C MFR BLD: 7 % (ref ?–5.7)
HCT VFR BLD AUTO: 38.7 %
HDLC SERPL-MCNC: 50 MG/DL (ref 40–59)
HGB BLD-MCNC: 11.9 G/DL
IMM GRANULOCYTES # BLD AUTO: 0.03 X10(3) UL (ref 0–1)
IMM GRANULOCYTES NFR BLD: 0.4 %
LDLC SERPL CALC-MCNC: 87 MG/DL (ref ?–100)
LYMPHOCYTES # BLD AUTO: 2.1 X10(3) UL (ref 1–4)
LYMPHOCYTES NFR BLD AUTO: 25.3 %
MCH RBC QN AUTO: 26.7 PG (ref 26–34)
MCHC RBC AUTO-ENTMCNC: 30.7 G/DL (ref 31–37)
MCV RBC AUTO: 86.8 FL
MONOCYTES # BLD AUTO: 0.56 X10(3) UL (ref 0.1–1)
MONOCYTES NFR BLD AUTO: 6.7 %
NEUTROPHILS # BLD AUTO: 5.39 X10 (3) UL (ref 1.5–7.7)
NEUTROPHILS # BLD AUTO: 5.39 X10(3) UL (ref 1.5–7.7)
NEUTROPHILS NFR BLD AUTO: 64.8 %
NONHDLC SERPL-MCNC: 105 MG/DL (ref ?–130)
OSMOLALITY SERPL CALC.SUM OF ELEC: 293 MOSM/KG (ref 275–295)
PLATELET # BLD AUTO: 356 10(3)UL (ref 150–450)
POTASSIUM SERPL-SCNC: 4 MMOL/L (ref 3.5–5.1)
PROT SERPL-MCNC: 7.8 G/DL (ref 5.7–8.2)
RBC # BLD AUTO: 4.46 X10(6)UL
SODIUM SERPL-SCNC: 141 MMOL/L (ref 136–145)
TRIGL SERPL-MCNC: 97 MG/DL (ref 30–149)
TSI SER-ACNC: 3.62 UIU/ML (ref 0.55–4.78)
VIT B12 SERPL-MCNC: 479 PG/ML (ref 211–911)
VIT D+METAB SERPL-MCNC: 23.3 NG/ML (ref 30–100)
VLDLC SERPL CALC-MCNC: 16 MG/DL (ref 0–30)
WBC # BLD AUTO: 8.3 X10(3) UL (ref 4–11)

## 2025-03-03 PROCEDURE — 36415 COLL VENOUS BLD VENIPUNCTURE: CPT

## 2025-03-03 PROCEDURE — 82607 VITAMIN B-12: CPT

## 2025-03-03 PROCEDURE — 80053 COMPREHEN METABOLIC PANEL: CPT

## 2025-03-03 PROCEDURE — 82306 VITAMIN D 25 HYDROXY: CPT

## 2025-03-03 PROCEDURE — 83036 HEMOGLOBIN GLYCOSYLATED A1C: CPT

## 2025-03-03 PROCEDURE — 85025 COMPLETE CBC W/AUTO DIFF WBC: CPT

## 2025-03-03 PROCEDURE — 84443 ASSAY THYROID STIM HORMONE: CPT

## 2025-03-03 PROCEDURE — 80061 LIPID PANEL: CPT

## 2025-03-04 ENCOUNTER — OFFICE VISIT (OUTPATIENT)
Dept: PODIATRY CLINIC | Facility: CLINIC | Age: 60
End: 2025-03-04

## 2025-03-04 DIAGNOSIS — L60.3 NAIL DYSTROPHY: Primary | ICD-10-CM

## 2025-03-04 DIAGNOSIS — Q82.8 POROKERATOSIS: ICD-10-CM

## 2025-03-04 DIAGNOSIS — E11.9 TYPE 2 DIABETES MELLITUS WITHOUT COMPLICATION, WITHOUT LONG-TERM CURRENT USE OF INSULIN (HCC): ICD-10-CM

## 2025-03-04 DIAGNOSIS — B35.1 ONYCHOMYCOSIS: ICD-10-CM

## 2025-03-04 PROCEDURE — 99213 OFFICE O/P EST LOW 20 MIN: CPT

## 2025-03-04 NOTE — PROGRESS NOTES
[facility]  Progress Note      Sandra Roche is a 60 year old female.   Chief Complaint   Patient presents with    Diabetic Foot Care     F/u Diabetic Foot Care, on 3/3/25 A1C= 7.0. LOV with Latricia Mora APRN was on 12/23/2024.           HPI:     Sandra Roche is a pleasant 60 year old female who presents to the clinic today for diabetic foot exam and nail care.  Patient has elongated and incurvated nails and has difficulty trimming on her own.  Patient does mention that she is diabetic and is on metformin.  Last hemoglobin A1c was 7.0 on 3/3/25.  Patient mentions that she has a spot to her left foot sub hallux that does not give her pain, but she mentions that it is \"rough\".  Past medical history, medications, allergies reviewed.       Allergies: Seasonal   Current Outpatient Medications   Medication Sig Dispense Refill    metFORMIN  MG Oral Tablet 24 Hr Take 1 tablet (500 mg total) by mouth daily. 90 tablet 3    zolpidem 10 MG Oral Tab Take 1 tablet (10 mg total) by mouth nightly as needed. 30 tablet 0    methylPREDNISolone 4 MG Oral Tablet Therapy Pack Take as directed 21 each 0    albuterol 108 (90 Base) MCG/ACT Inhalation Aero Soln Inhale 2 puffs into the lungs 4 (four) times daily as needed. 1 each 0    amLODIPine 5 MG Oral Tab TAKE 1 TABLET(5 MG) BY MOUTH DAILY 90 tablet 3    atorvastatin 40 MG Oral Tab Take 1 tablet (40 mg total) by mouth nightly. 90 tablet 3    gabapentin 100 MG Oral Cap Take 1 capsule (100 mg total) by mouth 3 (three) times daily. 90 capsule 0    HYDROcodone-acetaminophen (NORCO) 5-325 MG Oral Tab Take 1 tablet by mouth every 6 (six) hours as needed for Pain. 15 tablet 0    ergocalciferol 1.25 MG (48967 UT) Oral Cap Take 1 capsule (50,000 Units total) by mouth once a week. 6 capsule 0    docusate sodium 100 MG Oral Cap Take 1 capsule (100 mg total) by mouth 2 (two) times daily. 60 capsule 2    Triamterene-HCTZ 37.5-25 MG Oral Tab Take 1 tablet by mouth daily. 90  tablet 0      Past Medical History:    Diabetes (HCC)    Diverticulosis of large intestine    Esophageal reflux    Essential hypertension    High blood pressure    High cholesterol    Osteoarthritis    Pneumonia due to organism    Stroke (HCC)    2008    Visual impairment    READING GLASSES      Past Surgical History:   Procedure Laterality Date    Colectomy  03/20/2024    ROBOTIC LOW ANTERIOR COLON RESECTION    Colonoscopy N/A 09/07/2023    Procedure: COLONOSCOPY;  Surgeon: Evelyn Coles MD;  Location:  ENDOSCOPY    Exploratory of abdomen      Knee replacement surgery        Family History   Problem Relation Age of Onset    Heart Disorder Father     Cancer Mother     Hypertension Mother       Social History     Socioeconomic History    Marital status: Legally    Tobacco Use    Smoking status: Every Day     Current packs/day: 0.10     Types: Cigarettes    Smokeless tobacco: Never    Tobacco comments:     1-3 cigarettes a day      Updated 10/8/24   Vaping Use    Vaping status: Never Used   Substance and Sexual Activity    Alcohol use: Yes     Comment: occasional    Drug use: Never           REVIEW OF SYSTEMS:     10 point ROS completed and was negative, except for pertinent positive and negatives stated in subjective.       EXAM:     GENERAL: well developed, well nourished, in no apparent distress  EXTREMITIES:  1. Integument: Skin appears moist, warm, and supple. There are no color changes. No open lesions. No macerations. There is a hyperkeratotic lesion to left foot sub hallux. The patient's nails appear incurvated, thickened, and elongated.   2. Vascular: Dorsalis pedis 2/4 bilateral and posterior tibial pulses 2/4 bilateral, capillary refill normal.  3. Neurological: Gross sensation intact via light touch bilaterally.  Normal sharp/dull sensation. Monofilament test with normal sensation with [place] Chas monofilament testing.   4. Musculoskeletal: All muscle groups are graded 5/5 in the  foot and ankle. Patient is status post nail avulsion to right hallux. There is growth of new nail at the nailbed.        ASSESSMENT AND PLAN:   Diagnoses and all orders for this visit:    Nail dystrophy    Type 2 diabetes mellitus without complication, without long-term current use of insulin ([facility])    Onychomycosis    Porokeratosis      Plan:   - Patient was seen and evaluated today in clinic.  Chart history reviewed.    - At today's visit sharply debrided nails x9 with a sterile nail nipper achieving a 20% reduction in thickness and length, without incident. Slant back procedure performed to ingrown nails. Nails further smoothed with emery board.    - Discussed importance of proper pedal hygiene, regular foot checks, and tight glucose control by following diet and medication regimen.     - Pt was educated to keep her feet clean; ensure that she washes and dries between toes and utilizes a good moisturizing cream such as Amlactin or Urea cream.    - All of the patient's questions and concerns were addressed.  She indicates understanding of these issues and agrees to the plan.      Time spent reviewing pertinent information from patient's chart, reviewing any pertinent imaging, obtaining history and physical exam, discussing and mutually agreeing on a treatment plan, and documenting encounter: 20 minutes    RTC 2 to 3 months      KEZIA Bray        [facility]            Dragon speech recognition software was used to prepare this note.  Errors in word recognition may occur.  Please contact me with any questions/concerns with this note.

## 2025-03-10 ENCOUNTER — HOSPITAL ENCOUNTER (OUTPATIENT)
Dept: GENERAL RADIOLOGY | Age: 60
Discharge: HOME | End: 2025-03-10
Attending: INTERNAL MEDICINE
Payer: MEDICAID

## 2025-03-10 ENCOUNTER — LAB ENCOUNTER (OUTPATIENT)
Dept: LAB | Age: 60
End: 2025-03-10
Attending: INTERNAL MEDICINE
Payer: MEDICAID

## 2025-03-10 ENCOUNTER — OFFICE VISIT (OUTPATIENT)
Dept: INTERNAL MEDICINE CLINIC | Facility: CLINIC | Age: 60
End: 2025-03-10
Payer: MEDICAID

## 2025-03-10 VITALS
TEMPERATURE: 98 F | RESPIRATION RATE: 16 BRPM | WEIGHT: 186 LBS | HEIGHT: 67 IN | DIASTOLIC BLOOD PRESSURE: 64 MMHG | OXYGEN SATURATION: 98 % | BODY MASS INDEX: 29.19 KG/M2 | SYSTOLIC BLOOD PRESSURE: 116 MMHG | HEART RATE: 64 BPM

## 2025-03-10 DIAGNOSIS — G47.00 INSOMNIA, UNSPECIFIED TYPE: ICD-10-CM

## 2025-03-10 DIAGNOSIS — Z86.73 HISTORY OF CVA (CEREBROVASCULAR ACCIDENT): ICD-10-CM

## 2025-03-10 DIAGNOSIS — Z90.49 STATUS POST COLON RESECTION: ICD-10-CM

## 2025-03-10 DIAGNOSIS — R10.13 EPIGASTRIC ABDOMINAL PAIN: ICD-10-CM

## 2025-03-10 DIAGNOSIS — E78.2 MIXED HYPERLIPIDEMIA: ICD-10-CM

## 2025-03-10 DIAGNOSIS — Z87.19 HISTORY OF DIVERTICULITIS: ICD-10-CM

## 2025-03-10 DIAGNOSIS — Z87.39 HISTORY OF INFLAMMATORY ARTHRITIS: ICD-10-CM

## 2025-03-10 DIAGNOSIS — F17.200 CURRENT SMOKER: ICD-10-CM

## 2025-03-10 DIAGNOSIS — I10 PRIMARY HYPERTENSION: ICD-10-CM

## 2025-03-10 DIAGNOSIS — K21.9 GASTROESOPHAGEAL REFLUX DISEASE, UNSPECIFIED WHETHER ESOPHAGITIS PRESENT: ICD-10-CM

## 2025-03-10 DIAGNOSIS — E11.9 TYPE 2 DIABETES MELLITUS WITHOUT COMPLICATION, WITHOUT LONG-TERM CURRENT USE OF INSULIN (HCC): ICD-10-CM

## 2025-03-10 DIAGNOSIS — M15.0 PRIMARY OSTEOARTHRITIS INVOLVING MULTIPLE JOINTS: ICD-10-CM

## 2025-03-10 DIAGNOSIS — R06.02 SHORTNESS OF BREATH: ICD-10-CM

## 2025-03-10 DIAGNOSIS — E55.9 VITAMIN D INSUFFICIENCY: ICD-10-CM

## 2025-03-10 DIAGNOSIS — Z96.651 STATUS POST RIGHT KNEE REPLACEMENT: ICD-10-CM

## 2025-03-10 DIAGNOSIS — J30.2 SEASONAL ALLERGIES: ICD-10-CM

## 2025-03-10 DIAGNOSIS — Z86.0100 HISTORY OF COLON POLYPS: ICD-10-CM

## 2025-03-10 DIAGNOSIS — D64.9 NORMOCYTIC ANEMIA: ICD-10-CM

## 2025-03-10 DIAGNOSIS — Z00.00 WELLNESS EXAMINATION: Primary | ICD-10-CM

## 2025-03-10 PROBLEM — M17.0 PRIMARY OSTEOARTHRITIS OF BOTH KNEES: Status: RESOLVED | Noted: 2023-06-05 | Resolved: 2025-03-10

## 2025-03-10 PROBLEM — T84.84XA PAIN IN KNEE REGION AFTER TOTAL KNEE REPLACEMENT: Status: RESOLVED | Noted: 2024-04-26 | Resolved: 2025-03-10

## 2025-03-10 PROBLEM — Z96.659 PAIN IN KNEE REGION AFTER TOTAL KNEE REPLACEMENT: Status: RESOLVED | Noted: 2024-04-26 | Resolved: 2025-03-10

## 2025-03-10 LAB
CREAT UR-SCNC: 99.4 MG/DL
D DIMER PPP FEU-MCNC: 0.54 UG/ML FEU (ref ?–0.62)
DEPRECATED HBV CORE AB SER IA-ACNC: 32 NG/ML
HCT VFR BLD AUTO: 38.7 %
HGB BLD-MCNC: 12 G/DL
IRON SATN MFR SERPL: 25 %
IRON SERPL-MCNC: 74 UG/DL
MICROALBUMIN UR-MCNC: <0.3 MG/DL
TOTAL IRON BINDING CAPACITY: 298 UG/DL (ref 250–425)
TRANSFERRIN SERPL-MCNC: 234 MG/DL (ref 250–380)

## 2025-03-10 PROCEDURE — 82043 UR ALBUMIN QUANTITATIVE: CPT

## 2025-03-10 PROCEDURE — 85018 HEMOGLOBIN: CPT

## 2025-03-10 PROCEDURE — 82728 ASSAY OF FERRITIN: CPT

## 2025-03-10 PROCEDURE — 85014 HEMATOCRIT: CPT

## 2025-03-10 PROCEDURE — 83540 ASSAY OF IRON: CPT

## 2025-03-10 PROCEDURE — 85379 FIBRIN DEGRADATION QUANT: CPT

## 2025-03-10 PROCEDURE — 99396 PREV VISIT EST AGE 40-64: CPT | Performed by: INTERNAL MEDICINE

## 2025-03-10 PROCEDURE — 74018 RADEX ABDOMEN 1 VIEW: CPT | Performed by: INTERNAL MEDICINE

## 2025-03-10 PROCEDURE — 82570 ASSAY OF URINE CREATININE: CPT

## 2025-03-10 PROCEDURE — 36415 COLL VENOUS BLD VENIPUNCTURE: CPT

## 2025-03-10 PROCEDURE — 83550 IRON BINDING TEST: CPT

## 2025-03-10 RX ORDER — ERGOCALCIFEROL 1.25 MG/1
50000 CAPSULE, LIQUID FILLED ORAL WEEKLY
Qty: 12 CAPSULE | Refills: 0 | Status: SHIPPED | OUTPATIENT
Start: 2025-03-10 | End: 2025-05-27

## 2025-03-10 NOTE — PROGRESS NOTES
Subjective:   Sandra Roche is a 60 year old female who presents for a Annual Physical Exam (not Medicare, or ABN signed for Medicare non covered service) and scheduled follow up of multiple significant but stable problems.     She has been experiencing shortness of breath? She felt like she couldn't catch her breath while speaking. She has been feeling congested mainly in the morning. Denies cough. Started about 1 week ago. Feels like she's anemic.    Still smoking  Feeling fatigued  Abdominal pain for a few weeks. Epigastric abdominal pain. Worse when she was driving but not because of the bumps.  Loss of apetite    Denies any recent traveling.      History/Other:   Fall Risk Assessment: {Shmuel  Fall Risk Assessment:8307}  *** (Incomplete)  {Tip  Fall risk assessment incomplete. Refresh to ensure screening pulls in; if not, click link above to assess, then refresh:8307}      Cognitive Assessment: {Tip  Cognitive Assessment:8307}  *** (Incomplete)  Tip  Cognitive assessment incomplete. Refresh to ensure screening pulls in; if not,click link above to assess, then refresh:8307}      Functional Ability/Status: {Tip  Functional Status:8307}  *** (Incomplete)  {Tip  Functional status incomplete. Refresh to ensure screening pulls in; if not, click link above to assess, then refresh:8307}      Depression Screening (PHQ):{Tip  Depression Screenin}  PHQ-2 SCORE: 0  , done 3/10/2025        {Option to record time spent screening & counseling patient for depression (5+ minutes = ):87488::\" \"}    Advanced Directives: {Tip  Advance Care Plannin}  She does NOT have a Living Will. [ ]  She does NOT have a Power of  for Health Care. [ ]  {Advanced Directive Status:7286::\"Discussed Advance Care Planning with patient (and family/surrogate if present). Standard forms made available to patient in After Visit Summary.\"}    {Tip ResultsPMHPSHFHProbListImagingCardioLabAllergiesImm :8307}  Patient  Active Problem List   Diagnosis    Status post right knee replacement    Primary osteoarthritis of both knees    Insomnia    Seasonal allergies    History of colon polyps    Primary hypertension    Gastroesophageal reflux disease    History of CVA (cerebrovascular accident)    History of inflammatory arthritis    Primary osteoarthritis involving multiple joints    History of diverticulitis    Mixed hyperlipidemia    Type 2 diabetes mellitus without complication, without long-term current use of insulin ([facility])    Status post colon resection    Has smoked cigarettes within prior year    Pain in knee region after total knee replacement     Allergies:  She is allergic to seasonal.    Current Medications:  Outpatient Medications Marked as Taking for the 3/10/25 encounter (Office Visit) with Mariaelena Valle DO   Medication Sig    metFORMIN  MG Oral Tablet 24 Hr Take 1 tablet (500 mg total) by mouth daily.    zolpidem 10 MG Oral Tab Take 1 tablet (10 mg total) by mouth nightly as needed.    methylPREDNISolone 4 MG Oral Tablet Therapy Pack Take as directed    albuterol 108 (90 Base) MCG/ACT Inhalation Aero Soln Inhale 2 puffs into the lungs 4 (four) times daily as needed.    amLODIPine 5 MG Oral Tab TAKE 1 TABLET(5 MG) BY MOUTH DAILY    atorvastatin 40 MG Oral Tab Take 1 tablet (40 mg total) by mouth nightly.    gabapentin 100 MG Oral Cap Take 1 capsule (100 mg total) by mouth 3 (three) times daily.    HYDROcodone-acetaminophen (NORCO) 5-325 MG Oral Tab Take 1 tablet by mouth every 6 (six) hours as needed for Pain.    ergocalciferol 1.25 MG (23130 UT) Oral Cap Take 1 capsule (50,000 Units total) by mouth once a week.    docusate sodium 100 MG Oral Cap Take 1 capsule (100 mg total) by mouth 2 (two) times daily.    Triamterene-HCTZ 37.5-25 MG Oral Tab Take 1 tablet by mouth daily.       Medical History:  She  has a past medical history of Diabetes (formerly [facility]), Diverticulosis of large intestine, Esophageal reflux,  Essential hypertension, High blood pressure, High cholesterol, Osteoarthritis, Pneumonia due to organism, Stroke (HCC), and Visual impairment.  Surgical History:  She  has a past surgical history that includes knee replacement surgery; exploratory of abdomen; colonoscopy (N/A, 09/07/2023); and Colectomy (03/20/2024).   Family History:  Her family history includes Cancer in her mother; Heart Disorder in her father; Hypertension in her mother.  Social History:  She  reports that she has been smoking cigarettes. She has never used smokeless tobacco. She reports current alcohol use. She reports that she does not use drugs.    Tobacco:  Social History     Tobacco Use   Smoking Status Every Day    Current packs/day: 0.10    Types: Cigarettes   Smokeless Tobacco Never   Tobacco Comments    1-3 cigarettes a day     Updated 10/8/24     E-Cigarettes/Vaping       Questions Responses    E-Cigarette Use Never User          E-Cigarette/Vaping Substances       Questions Responses    Nicotine Yes    THC No    CBD No    Flavoring No          E-Cigarette/Vaping Devices       Questions Responses    Disposable No    Pre-filled or Refillable Cartridge No    Refillable Tank No    Pre-filled Pod No           Tobacco cessation counseling {GIVEN:97281}.      CAGE Alcohol Screen: {Tip  CAGE Alcohol Screen:8307}  *** Cage NOT Performed in the last 6 months, please do assessment! Last Cage score was  on .    {Tip   Care Team:8307}  Patient Care Team:  Mariaelena Valle DO as PCP - General (Internal Medicine)  Daisy Mathias, PT as Physical Therapist  Lolis Szymanski PT as Physical Therapist  Evelyn Coles MD (SURGERY, GENERAL)  Jeanette Rao PTA as Physical Therapy Assistant (Physical Therapy)  Gloria Guerrero as Physical Therapist    Review of Systems  {Female Review of Systems (Optional):7352}    Objective:   Physical Exam  {Use this to document a Female Complete Physical Exam (pelvic deferred) - Defaults to Blank -DEL to  delete as it is not needed for AWV but is needed for CPX or Medicare Supervisit:6118}    /64   Pulse 64   Temp 98.2 °F (36.8 °C)   Resp 16   Ht 5' 7\" (1.702 m)   Wt 186 lb (84.4 kg)   SpO2 98%   BMI 29.13 kg/m²  Estimated body mass index is 29.13 kg/m² as calculated from the following:    Height as of this encounter: 5' 7\" (1.702 m).    Weight as of this encounter: 186 lb (84.4 kg).    Medicare Hearing Assessment: {Tip (Required for AWV/SWV) Hearing Assessment:8307}  *** (Incomplete)  {Tip  Hearing Screening incomplete. Refresh to ensure screening pulls in; if not, click link above to assess, then refresh:830    {Tip  Vision Screenin}  {Tip  Vision Screening incomplete. Required for IPPE/first MA Supervisit. Click link above to assess, then refresh. If vision screening not recorded, this link will disappear upon signing note/encounter:8307}    Assessment & Plan:   Sandra Roche is a 60 year old female who presents for a Medicare Assessment.     There are no diagnoses linked to this encounter.  The patient indicates understanding of these issues and agrees to the plan.  {lifestyle and A/P options:5845::\"Reinforced healthy diet, lifestyle, and exercise.\"}    {Tip  Follow Up:8307}  No follow-ups on file.     Mariaelena Valle DO, 3/10/2025     Supplementary Documentation:   General Health:       Health Maintenance   Topic Date Due    COVID-19 Vaccine ( -  season) Never done    Influenza Vaccine (1) Never done    Annual Physical  10/04/2024    Tobacco Cessation Counseling  Never done    Diabetes Care: Microalb/Creat Ratio (Annual)  2025    Diabetes Care A1C  2025    Mammogram  10/15/2025    Diabetes Care Dilated Eye Exam  10/23/2025    Diabetes Care: GFR  2026    DTaP,Tdap,and Td Vaccines (2 - Td or Tdap) 2030    Colorectal Cancer Screening  2033    Annual Depression Screening  Completed    Diabetes Care: Foot Exam (Annual)  Completed    Pneumococcal  Vaccine: 50+ Years  Completed    Zoster Vaccines  Completed    Meningococcal B Vaccine  Aged Out

## 2025-03-11 NOTE — PROGRESS NOTES
Sandra Roche  2/1/1965    Chief Complaint   Patient presents with    Physical     TA RM12     SUBJECTIVE   Sandra Roche is a 60 year old female who presents for annual physical examination.    She has been experiencing shortness of breath, but only when speaking. She does not feel shortness of breath with exertion. Denies chest pain. She has been feeling a little congested but only in the morning for the last 1 week. Denies cough.    She states that she feels anemic?    She is still smoking only a few cigarettes per day. She has smoked for over 45 years about 0.5 packs per day.     She has been experiencing epigastric abdominal pain for the past few weeks. Non-radiating. She felt pain on the way here but not because of the bumps.     Review of Systems   Review of Systems   No f/c/chest pain or sob. No cough. No ha or dizziness. No numbness, tingling, or weakness.   OBJECTIVE:   /64   Pulse 64   Temp 98.2 °F (36.8 °C)   Resp 16   Ht 5' 7\" (1.702 m)   Wt 186 lb (84.4 kg)   SpO2 98%   BMI 29.13 kg/m²   Physical Exam   Constitutional: Oriented to person, place, and time. No distress.   HEENT:  Normocephalic and atraumatic.Tympanic membranes appear normal. Oropharynx is clear and moist.   Eyes: Conjunctivae not injected.  Extraocular movements are intact  Neck: Full ROM.  Neck supple.  No thyromegaly  Cardiovascular: Normal rate, regular rhythm and intact distal pulses.  No murmur, rubs or gallops.   Pulmonary/Chest: Effort normal and breath sounds normal. No respiratory distress.  Abdominal: Soft. Bowel sounds are normal. No masses, no organomegaly or hernias. The abdomen is slightly distended. There is mild tenderness in the epigastric region.  Musculoskeletal: No edema in bilateral lower extremities.  Strength is 5/5 in bilateral upper and lower extremities.  Lymphadenopathy: No cervical adenopathy.   Neurological: No focal neurologic deficits.  Cranial nerves II through XII are intact.    Skin: Skin  is warm and dry. No rash on visualized skin.  Psychiatric: Normal mood and affect.     Lab Results   Component Value Date     (H) 03/03/2025    BUN 9 03/03/2025    CREATSERUM 0.80 03/03/2025    ANIONGAP 11 03/03/2025    CA 9.6 03/03/2025     03/03/2025    K 4.0 03/03/2025     03/03/2025    CO2 27.0 03/03/2025    OSMOCALC 293 03/03/2025      Lab Results   Component Value Date    WBC 8.3 03/03/2025    RBC 4.46 03/03/2025    HGB 12.0 03/10/2025    HCT 38.7 03/10/2025    MCV 86.8 03/03/2025    MCH 26.7 03/03/2025    MCHC 30.7 (L) 03/03/2025    RDW 13.9 03/03/2025    .0 03/03/2025      Lab Results   Component Value Date    TSH 3.624 03/03/2025        ASSESSMENT AND PLAN:       ICD-10-CM    1. Wellness examination  Z00.00       2. Vitamin D insufficiency  E55.9 ergocalciferol 1.25 MG (17348 UT) Oral Cap      3. Normocytic anemia  D64.9 Hemoglobin & Hematocrit     Iron And Tibc [E]     Ferritin [E]      4. Type 2 diabetes mellitus without complication, without long-term current use of insulin (HCC)  E11.9 Microalb/Creat Ratio, Random Urine [E]  Most recent A1c 7.0% which is right at goal. Continue Metformin  mg daily.       5. Shortness of breath  R06.02 D-Dimer [E]     CT LUNG LD SCREENING(CPT=71271)     Only when speaking? No exertional symptoms. Had similar symptoms in 2024. EKG, ECHO, and nuclear stress test were all unremarkable. Will check Dimer. Given history of smoking will check low dose CT. No concern for infectious process today given afebrile, no cough, etc.      6. Current smoker  F17.200 CT LUNG LD SCREENING(CPT=71271)      7. Epigastric abdominal pain  R10.13 XR ABDOMEN (1 VIEW) (CPT=74018)  The patient is s/p robotic low anterior colon resection in 2024 for diverticulitis. She is more constipated? Abdomen is soft but distended. Will check KUB today. Patient has appointment with surgery in next few weeks.       8. Mixed hyperlipidemia  E78.2 LDL slightly above goal for  diabetes. Continue statin therapy.      9. Primary hypertension  I10 Blood pressure is well controlled in office today. Continue current regimen which includes Amlodipine 5 mg, Triamterene-hydrochlorothiazide.       10. History of CVA (cerebrovascular accident)  Z86.73 Continue aspirin and statin.      11. Gastroesophageal reflux disease, unspecified whether esophagitis present  K21.9 Stable, continue to monitor. PPI PRN.      12. History of colon polyps  Z86.0100 Last colonoscopy in 2023 without polyps. Repeat in 2033.      13. History of diverticulitis  Z87.19 Management per GS.      14. Status post colon resection  Z90.49 Management per GS.      15. History of inflammatory arthritis  Z87.39 Consider checking autoimmune panel with next blood draw.       16. Primary osteoarthritis involving multiple joints  M15.0 Management per Orthopedics. Last injection 12/2024.      17. Status post right knee replacement  Z96.651 Stable, continue to monitor.      18. Seasonal allergies  J30.2 Stable, continue to monitor.       19. Insomnia, unspecified type  G47.00 Continue Zolpidem nightly.             TODAY'S ORDERS     Orders Placed This Encounter   Procedures    Hemoglobin & Hematocrit    Iron And Tibc [E]    Ferritin [E]    Microalb/Creat Ratio, Random Urine [E]    D-Dimer [E]       Meds & Refills:  Requested Prescriptions     Signed Prescriptions Disp Refills    ergocalciferol 1.25 MG (49570 UT) Oral Cap 12 capsule 0     Sig: Take 1 capsule (50,000 Units total) by mouth once a week for 12 doses.       Imaging & Consults:  CT LUNG LD SCREENING(CPT=71271)    No follow-ups on file.  There are no Patient Instructions on file for this visit.    All questions were answered and the patient agrees with the plan.     Thank you,  Mariaelena Valle, DO

## 2025-03-18 ENCOUNTER — OFFICE VISIT (OUTPATIENT)
Dept: INTERNAL MEDICINE CLINIC | Facility: CLINIC | Age: 60
End: 2025-03-18
Payer: MEDICAID

## 2025-03-18 VITALS
HEART RATE: 56 BPM | DIASTOLIC BLOOD PRESSURE: 60 MMHG | RESPIRATION RATE: 16 BRPM | BODY MASS INDEX: 28.25 KG/M2 | TEMPERATURE: 97 F | SYSTOLIC BLOOD PRESSURE: 102 MMHG | OXYGEN SATURATION: 96 % | WEIGHT: 180 LBS | HEIGHT: 67 IN

## 2025-03-18 DIAGNOSIS — E61.1 IRON DEFICIENCY: ICD-10-CM

## 2025-03-18 DIAGNOSIS — I10 PRIMARY HYPERTENSION: ICD-10-CM

## 2025-03-18 DIAGNOSIS — R10.30 LOWER ABDOMINAL PAIN: Primary | ICD-10-CM

## 2025-03-18 DIAGNOSIS — Z87.19 HISTORY OF DIVERTICULITIS: ICD-10-CM

## 2025-03-18 DIAGNOSIS — F41.9 ANXIETY: ICD-10-CM

## 2025-03-18 DIAGNOSIS — G47.00 INSOMNIA, UNSPECIFIED TYPE: ICD-10-CM

## 2025-03-18 PROCEDURE — 99215 OFFICE O/P EST HI 40 MIN: CPT | Performed by: INTERNAL MEDICINE

## 2025-03-18 RX ORDER — ALPRAZOLAM 0.25 MG
0.25 TABLET ORAL DAILY PRN
Qty: 10 TABLET | Refills: 0 | Status: SHIPPED | OUTPATIENT
Start: 2025-03-18

## 2025-03-18 RX ORDER — ZOLPIDEM TARTRATE 10 MG/1
10 TABLET ORAL NIGHTLY PRN
Qty: 30 TABLET | Refills: 0 | Status: SHIPPED | OUTPATIENT
Start: 2025-03-18

## 2025-03-18 NOTE — PROGRESS NOTES
Sandra Roche  2/1/1965    Chief Complaint   Patient presents with    Follow - Up     Rm-14, aw, f/u lab results        SUBJECTIVE   Sandra Roche is a 60 year old female who presents for follow up.    She was seen on 3/10 for CPE. She had shortness of breath only when speaking. Never with exertion.     Today the patient states that sometimes she gets flustered when she is talking. She may be feeling anxious. Her sister has anxiety. She is not interested in antihistamine. She is also not interested in a daily medication at this point. She would like to try Alprazolam as needed.    She started OTC Fe for Fe deficiency. No blood in stool. No blood in urine.    She continues to have lower quadrant abdominal pain. Pain is located in suprapubic region but is not accompanied by urinary symptoms such as dysuria, hematuria, urgency, frequency, etc. Her appointment with Surgery is not for another 1 month. She is having regular bowel movements.       Review of Systems   Review of Systems   No f/c/chest pain or sob. No cough. No abd pain/n/v/d. No ha or dizziness. No numbness, tingling, or weakness.     OBJECTIVE:   /60 (BP Location: Right arm, Patient Position: Sitting, Cuff Size: adult)   Pulse 56   Temp 97 °F (36.1 °C) (Temporal)   Resp 16   Ht 5' 7\" (1.702 m)   Wt 180 lb (81.6 kg)   SpO2 96%   Breastfeeding No   BMI 28.19 kg/m²   Physical Exam   Constitutional: Oriented to person, place, and time. No distress.   Cardiovascular: Normal rate, regular rhythm and intact distal pulses.  No murmur, rubs or gallops.   Pulmonary/Chest: Effort normal and breath sounds normal. No respiratory distress.  Musculoskeletal: No edema    Lab Results   Component Value Date     (H) 03/03/2025    BUN 9 03/03/2025    CREATSERUM 0.80 03/03/2025    ANIONGAP 11 03/03/2025    CA 9.6 03/03/2025     03/03/2025    K 4.0 03/03/2025     03/03/2025    CO2 27.0 03/03/2025    OSMOCALC 293 03/03/2025      Lab Results    Component Value Date    WBC 8.3 03/03/2025    RBC 4.46 03/03/2025    HGB 12.0 03/10/2025    HCT 38.7 03/10/2025    MCV 86.8 03/03/2025    MCH 26.7 03/03/2025    MCHC 30.7 (L) 03/03/2025    RDW 13.9 03/03/2025    .0 03/03/2025      Lab Results   Component Value Date    TSH 3.624 03/03/2025        ASSESSMENT AND PLAN:       ICD-10-CM    1. Lower abdominal pain  R10.30 CT ABDOMEN+PELVIS(CONTRAST ONLY)(CPT=74177)  Patient has appointment scheduled to Surgery in April and is on the waiting list. Given persistent pain will check CT. 3/10 KUB showed stool.      2. Insomnia, unspecified type  G47.00 zolpidem 10 MG Oral Tab      3. Primary hypertension  I10 BP is well controlled in office today. Continue Amlodipine 5 mg.      4. History of diverticulitis  Z87.19 CT ABDOMEN+PELVIS(CONTRAST ONLY)(CPT=74177)  History of colon resection in 2024. Follow up with Surgery as above.      5. Anxiety  F41.9 ALPRAZolam 0.25 MG Oral Tab  Patient instructed NOT to take Alprazolam with Zolpidem, dangerous combination. Can only take as needed and during the day. If needed more regularly then we will start a daily medication such as SSRI.      6. Iron deficiency  E61.1 Iron And Tibc [E]     Ferritin [E]  Last colonoscopy 3/2024. She's had chronic mild anemia. Hemoglobin improved to 12 but ferritin low. Continue Fe every other day to avoid constipation.             TODAY'S ORDERS     No orders of the defined types were placed in this encounter.      Meds & Refills:  Requested Prescriptions      No prescriptions requested or ordered in this encounter       Imaging & Consults:  None    No follow-ups on file.  There are no Patient Instructions on file for this visit.    All questions were answered and the patient agrees with the plan. Code selection for this visit was based on time spent (>45 min) on date of service in preparing to see the patient, obtaining and/or reviewing separately obtained history, performing a medically  appropriate examination, counseling and educating the patient/family/caregiver, ordering medications or testing, referring and communicating with other healthcare providers, documenting clinical information in the EHR, independently interpreting results and communicating results to the patient/family/caregiver and care coordination with the patient's other providers.      Thank you,  Mariaelena Valle DO

## 2025-03-31 ENCOUNTER — HOSPITAL ENCOUNTER (OUTPATIENT)
Dept: CT IMAGING | Age: 60
Discharge: HOME | End: 2025-03-31
Attending: INTERNAL MEDICINE
Payer: MEDICAID

## 2025-03-31 ENCOUNTER — TELEPHONE (OUTPATIENT)
Facility: CLINIC | Age: 60
End: 2025-03-31

## 2025-03-31 DIAGNOSIS — M17.12 PRIMARY OSTEOARTHRITIS OF LEFT KNEE: Primary | ICD-10-CM

## 2025-03-31 DIAGNOSIS — R10.30 LOWER ABDOMINAL PAIN: ICD-10-CM

## 2025-03-31 DIAGNOSIS — Z87.19 HISTORY OF DIVERTICULITIS: ICD-10-CM

## 2025-03-31 PROCEDURE — 74177 CT ABD & PELVIS W/CONTRAST: CPT | Performed by: INTERNAL MEDICINE

## 2025-04-02 ENCOUNTER — OFFICE VISIT (OUTPATIENT)
Dept: ORTHOPEDICS CLINIC | Facility: CLINIC | Age: 60
End: 2025-04-02
Payer: MEDICAID

## 2025-04-02 ENCOUNTER — TELEPHONE (OUTPATIENT)
Dept: ORTHOPEDICS CLINIC | Facility: CLINIC | Age: 60
End: 2025-04-02

## 2025-04-02 DIAGNOSIS — R20.2 NUMBNESS AND TINGLING OF FOOT: ICD-10-CM

## 2025-04-02 DIAGNOSIS — M17.12 PRIMARY OSTEOARTHRITIS OF LEFT KNEE: Primary | ICD-10-CM

## 2025-04-02 DIAGNOSIS — R20.0 NUMBNESS AND TINGLING OF FOOT: ICD-10-CM

## 2025-04-02 PROCEDURE — 20610 DRAIN/INJ JOINT/BURSA W/O US: CPT | Performed by: PHYSICIAN ASSISTANT

## 2025-04-02 RX ORDER — TRIAMCINOLONE ACETONIDE 40 MG/ML
40 INJECTION, SUSPENSION INTRA-ARTICULAR; INTRAMUSCULAR ONCE
Status: COMPLETED | OUTPATIENT
Start: 2025-04-02 | End: 2025-04-02

## 2025-04-02 RX ORDER — GABAPENTIN 300 MG/1
300 CAPSULE ORAL 2 TIMES DAILY
Qty: 40 CAPSULE | Refills: 0 | Status: SHIPPED | OUTPATIENT
Start: 2025-04-02

## 2025-04-02 RX ADMIN — TRIAMCINOLONE ACETONIDE 40 MG: 40 INJECTION, SUSPENSION INTRA-ARTICULAR; INTRAMUSCULAR at 10:13:00

## 2025-04-02 NOTE — PROCEDURES
Patient also reports numbing and tingling sensation along the plantar surface of her right foot over the last 2 weeks without any known inciting event.  Discussed nerve irritation likely contributing to her symptoms and discussed gabapentin use.  This has been ordered I encouraged her to take this twice per day.  Synvisc series has been reordered for the patient's left knee.    Risks and benefits of knee injection discussed with the patient, with risks including but not limited to pain and swelling at the injection site and/or within the knee joint, infection, elevation in blood pressure and/or glucose levels, facial flushing. After informed consent, the patient's left knee was marked, locally anesthetized with skin refrigerant, prepped with topical antiseptic, and injected with a mixture of 1mL 40mg/mL Kenalog, 2mL 1% lidocaine and 2mL 0.5% marcaine through the inferolateral portal.  A band-aid was applied.  The patient tolerated the procedure well.    Laurent Patterson PA-C  [facility]  
Patient received complaining of abdominal pain x 2 hours with nausea and vomiting. States h/x of GERD,

## 2025-04-15 DIAGNOSIS — F41.9 ANXIETY: ICD-10-CM

## 2025-04-15 DIAGNOSIS — G47.00 INSOMNIA, UNSPECIFIED TYPE: ICD-10-CM

## 2025-04-16 ENCOUNTER — TELEPHONE (OUTPATIENT)
Facility: LOCATION | Age: 60
End: 2025-04-16

## 2025-04-16 NOTE — TELEPHONE ENCOUNTER
The patient called recently to report that she has an appointment scheduled for tomorrow with Dr. Kearney. She expressed ongoing difficulty with bowel movements, stating that she has taken multiple stool softeners without relief. I advised the patient that if her symptoms become severe or unmanageable, she should seek immediate medical attention at the emergency room.      Call back #[phone]

## 2025-04-16 NOTE — TELEPHONE ENCOUNTER
Spoke with patient.  Patient states she has not been able to have a bowel movement in seemingly a week.  Patient has taken miralax a few times in the past 3 days.  Patient not able to describe what she is feeling exactly.  Patient last seen in office 12/2024.  Patient will see surgeon tomorrow 4/17/2025. Patient advised if symptoms worsens over night and unable to manage, she should present to ED.  Patient verbalized understanding

## 2025-04-17 ENCOUNTER — OFFICE VISIT (OUTPATIENT)
Facility: LOCATION | Age: 60
End: 2025-04-17
Payer: MEDICAID

## 2025-04-17 ENCOUNTER — DOCUMENTATION ONLY (OUTPATIENT)
Facility: LOCATION | Age: 60
End: 2025-04-17

## 2025-04-17 VITALS
HEART RATE: 54 BPM | TEMPERATURE: 97 F | RESPIRATION RATE: 18 BRPM | DIASTOLIC BLOOD PRESSURE: 72 MMHG | SYSTOLIC BLOOD PRESSURE: 107 MMHG | OXYGEN SATURATION: 94 %

## 2025-04-17 DIAGNOSIS — K59.00 CONSTIPATION, UNSPECIFIED CONSTIPATION TYPE: ICD-10-CM

## 2025-04-17 DIAGNOSIS — R10.84 GENERALIZED ABDOMINAL PAIN: Primary | ICD-10-CM

## 2025-04-17 PROCEDURE — 99214 OFFICE O/P EST MOD 30 MIN: CPT | Performed by: STUDENT IN AN ORGANIZED HEALTH CARE EDUCATION/TRAINING PROGRAM

## 2025-04-17 RX ORDER — POLYETHYLENE GLYCOL 3350, SODIUM CHLORIDE, SODIUM BICARBONATE, POTASSIUM CHLORIDE 420; 11.2; 5.72; 1.48 G/4L; G/4L; G/4L; G/4L
POWDER, FOR SOLUTION ORAL
Qty: 1 EACH | Refills: 0 | Status: SHIPPED | OUTPATIENT
Start: 2025-04-17

## 2025-04-17 NOTE — PROGRESS NOTES
The following individual(s) verbally consented to be recorded using ambient AI listening technology and understand that they can each withdraw their consent to this listening technology at any point by asking the clinician to turn off or pause the recording:    Patient name: Sandra Roche  Additional names:

## 2025-04-17 NOTE — PROGRESS NOTES
[facility]  Progress Note    Sandra Roche Patient Status:  No patient class for patient encounter    1965 MRN [id number]   Location [facility], [facility] Attending No att. providers found   Hosp Day # 0 PCP Mariaelena Valle,      Subjective:  Patient is presenting today feeling overall unwell. She reports no bowel movements for the last two weeks, states she had dark stool that was larger than a pea size this morning. She reports using magnesium citrate, metamucil, and miralax without relief. The patient reports pins and needle like pain throughout the entire abdomen. She complained of tenderness upon palpation in office across the mid abdomen. The patient also reports constant pelvic pressure and bloating. She reports an episode of minimal dark red blood in stools last month that has not reoccurred. Per patient, her diet is consistent with vegetables and greens. She reports nausea, denies vomiting or fever.     Objective/Physical Exam:  General: Alert, orientated x3.  Cooperative.  No apparent distress.  Vital Signs:  Blood pressure 107/72, pulse 54, temperature 97.3 °F (36.3 °C), temperature source Temporal, resp. rate 18, SpO2 94%, not currently breastfeeding.  Wt Readings from Last 3 Encounters:   25 180 lb   03/10/25 186 lb   24 183 lb 6.4 oz     Lungs: No respiratory distress.   Abdomen:  Soft, mildly-distended, mildly-tender, with no rebound or guarding.  No peritoneal signs.   Extremities:  No lower extremity edema noted.      Intake/Output:  [unfilled]  [unfilled]  @[facility]@    Medications: Scheduled Medications[1]    Labs:        No results found for: \"PT\", \"INR\"      Assessment  Problem List[2]  Diverticulitis   Chronic constipation       Plan:  CT abdomen and pelvis STAT  Colonoscopy to be scheduled if unremarkable CT results  High fiber diet encouraged, continue stool softeners and supplements as needed      Quality:  [unfilled]        NEETA Hills  4/17/2025  10:36 AM      The patient was discussed with Paulo Kearney MD            [1] [2]   Patient Active Problem List  Diagnosis    Status post right knee replacement    Insomnia    Seasonal allergies    History of colon polyps    Primary hypertension    Gastroesophageal reflux disease    History of CVA (cerebrovascular accident)    History of inflammatory arthritis    Primary osteoarthritis involving multiple joints    History of diverticulitis    Mixed hyperlipidemia    Type 2 diabetes mellitus without complication, without long-term current use of insulin (HCC)    Status post colon resection    Has smoked cigarettes within prior year

## 2025-04-18 ENCOUNTER — HOSPITAL ENCOUNTER (OUTPATIENT)
Dept: CT IMAGING | Facility: HOSPITAL | Age: 60
Discharge: HOME | End: 2025-04-18
Attending: STUDENT IN AN ORGANIZED HEALTH CARE EDUCATION/TRAINING PROGRAM
Payer: MEDICAID

## 2025-04-18 DIAGNOSIS — R10.84 GENERALIZED ABDOMINAL PAIN: ICD-10-CM

## 2025-04-18 DIAGNOSIS — K59.00 CONSTIPATION, UNSPECIFIED CONSTIPATION TYPE: ICD-10-CM

## 2025-04-18 LAB
CREAT BLD-MCNC: 0.9 MG/DL (ref 0.55–1.02)
EGFRCR SERPLBLD CKD-EPI 2021: 72 ML/MIN/1.73M2 (ref 60–?)

## 2025-04-18 PROCEDURE — 74177 CT ABD & PELVIS W/CONTRAST: CPT | Performed by: STUDENT IN AN ORGANIZED HEALTH CARE EDUCATION/TRAINING PROGRAM

## 2025-04-18 PROCEDURE — 82565 ASSAY OF CREATININE: CPT

## 2025-04-18 NOTE — PROGRESS NOTES
Follow Up Visit Note    The following individual(s) verbally consented to be recorded using ambient AI listening technology and understand that they can each withdraw their consent to this listening technology at any point by asking the clinician to turn off or pause the recording:    Patient name: Sandra Roche     Active Problems      1. Generalized abdominal pain    2. Constipation, unspecified constipation type          Chief Complaint   Chief Complaint   Patient presents with    [facility]     EP- Diverticulitis, 3/31/25 CT Abd/Pel - reports nausea, abdominal pain and bloating, no BMs for about 2 weeks accept for this morning a pea size BM, no fevers or chills, states not feeling good at all          History of Present Illness  History of Present Illness  Sandra Roche is a 60-year-old female with a history of recurrent/smoldering sigmoid diverticulitis status post robotic low anterior colon resection on 3/20/2024 who returns to clinic with complains of constipation and abdominal discomfort.    She has been experiencing constipation and abdominal discomfort for over a week, with a sensation of fullness and nausea. Despite taking Metamucil, MiraLax, and Mg citrate, which usually help, she has not had a significant bowel movement in almost two weeks, passing only a small amount of stool this morning.    The abdominal discomfort is described as feeling like needles poking her abdomen and was particularly severe last night, with sharp, pin-like pains. She feels bloated and has difficulty passing gas, although there was increased gas after drinking a contrast solution for a CT scan.    A CT scan was performed on March 31, 2025 and was unremarkable. She had a colonoscopy performed by Dr. Coles on 9/7/2023 and this showed diverticulosis. She recalls an episode of dark stool about a month ago but denies any recent vomiting, although she has felt nauseous multiple times, feeling as though she might vomit but not  actually doing so.    She reports that her 'butt part' is painful, describing it as feeling like she wants to use it but can't. No fevers. She is frustrated with her current symptoms and the impact on her daily life.         Allergies  Sandra is allergic to seasonal.    Past Medical / Surgical / Social / Family History    The past medical and past surgical history have been reviewed by me today.    Past Medical History[1]  Past Surgical History[2]    The family history and social history have been reviewed by me today.    Family History[3]  Social Hx on file[4]   Medications - Current[5]     Review of Systems  A 10 point review of systems was performed and negative unless otherwise documented per HPI.     Physical Findings   /72   Pulse 54   Temp 97.3 °F (36.3 °C) (Temporal)   Resp 18   SpO2 94%   Physical Exam  Vitals and nursing note reviewed. Exam conducted with a chaperone present.   Constitutional:       General: She is not in acute distress.  HENT:      Head: Normocephalic and atraumatic.      Mouth/Throat:      Mouth: Mucous membranes are moist.   Cardiovascular:      Rate and Rhythm: Normal rate and regular rhythm.   Pulmonary:      Effort: Pulmonary effort is normal.   Abdominal:      General: There is distension (moderatly distended).      Palpations: Abdomen is soft. There is no mass.      Tenderness: There is abdominal tenderness (Mild tenderness throughout).      Hernia: No hernia is present.      Comments: Well-healed surgical scars   Musculoskeletal:         General: No deformity.   Skin:     General: Skin is warm and dry.   Neurological:      General: No focal deficit present.      Mental Status: She is alert.   Psychiatric:         Mood and Affect: Mood normal.           Results  RADIOLOGY  I have personally reviewed the imaging of patient's CT scan of the abdomen and pelvis from 3/31/2025.  I showed the imaging to the patient at bedside.  No obvious pathology to account for the patient's  abdominal pain and constipation.       Assessment & Plan  Constipation and abdominal pain  Reports a two-week history of constipation with minimal bowel movements, nausea, and abdominal discomfort. CT scan on March 31, 2025 was unremarkable.   - Order stat CT scan to evaluate for obstruction or other abnormalities.  - Consider colonoscopy if CT scan is negative to further investigate the cause of symptoms.  The details of the colonoscopy procedure were discussed including the prep instructions, risks, benefits and alternatives.  Patient expressed understanding and was agreeable to schedule a colonoscopy with me as CT is unremarkable.  Colonoscopy has been tentatively scheduled for 4/22/2025 at [facility].  - Advise to go to the emergency room if symptoms worsen           No orders of the defined types were placed in this encounter.      Imaging & Referrals   CT ABDOMEN+PELVIS(CONTRAST ONLY)(CPT=74177)    Follow Up  No follow-ups on file.    Paulo Kearney MD           [1]   Past Medical History:   Diabetes (HCC)    Diverticulosis of large intestine    Esophageal reflux    Essential hypertension    High blood pressure    High cholesterol    Osteoarthritis    Pneumonia due to organism    Stroke (HCC)    2008    Visual impairment    READING GLASSES   [2]   Past Surgical History:  Procedure Laterality Date    Colectomy  03/20/2024    ROBOTIC LOW ANTERIOR COLON RESECTION    Colonoscopy N/A 09/07/2023    Procedure: COLONOSCOPY;  Surgeon: Evelyn Coles MD;  Location:  ENDOSCOPY    Exploratory of abdomen      Knee replacement surgery     [3]   Family History  Problem Relation Age of Onset    Heart Disorder Father     Cancer Mother     Hypertension Mother    [4]   Social History  Socioeconomic History    Marital status: Legally    Tobacco Use    Smoking status: Every Day     Current packs/day: 0.50     Average packs/day: 0.5 packs/day for 43.3 years (21.6 ttl pk-yrs)     Types: Cigarettes     Start  date: 1982     Passive exposure: Current    Smokeless tobacco: Never    Tobacco comments:     1-3 cigarettes a day      Updated 10/8/24   Vaping Use    Vaping status: Never Used   Substance and Sexual Activity    Alcohol use: Yes     Comment: occasional    Drug use: Never   Other Topics Concern    Caffeine Concern No    Exercise No    Seat Belt Yes    Special Diet No    Stress Concern No    Weight Concern No   [5]   Current Outpatient Medications:     PEG 3350-KCl-Na Bicarb-NaCl 420 g Oral Recon Soln, Starting at 4:00 pm the night before procedure, drink 8 ounces of the prep every 15-20 minutes until finished, Disp: 1 each, Rfl: 0    gabapentin 300 MG Oral Cap, Take 1 capsule (300 mg total) by mouth in the morning and 1 capsule (300 mg total) before bedtime., Disp: 40 capsule, Rfl: 0    zolpidem 10 MG Oral Tab, Take 1 tablet (10 mg total) by mouth nightly as needed., Disp: 30 tablet, Rfl: 0    ALPRAZolam 0.25 MG Oral Tab, Take 1 tablet (0.25 mg total) by mouth daily as needed., Disp: 10 tablet, Rfl: 0    ergocalciferol 1.25 MG (28769 UT) Oral Cap, Take 1 capsule (50,000 Units total) by mouth once a week for 12 doses., Disp: 12 capsule, Rfl: 0    metFORMIN  MG Oral Tablet 24 Hr, Take 1 tablet (500 mg total) by mouth daily., Disp: 90 tablet, Rfl: 3    albuterol 108 (90 Base) MCG/ACT Inhalation Aero Soln, Inhale 2 puffs into the lungs 4 (four) times daily as needed., Disp: 1 each, Rfl: 0    amLODIPine 5 MG Oral Tab, TAKE 1 TABLET(5 MG) BY MOUTH DAILY, Disp: 90 tablet, Rfl: 3    atorvastatin 40 MG Oral Tab, Take 1 tablet (40 mg total) by mouth nightly., Disp: 90 tablet, Rfl: 3    HYDROcodone-acetaminophen (NORCO) 5-325 MG Oral Tab, Take 1 tablet by mouth every 6 (six) hours as needed for Pain., Disp: 15 tablet, Rfl: 0    docusate sodium 100 MG Oral Cap, Take 1 capsule (100 mg total) by mouth 2 (two) times daily., Disp: 60 capsule, Rfl: 2

## 2025-04-18 NOTE — TELEPHONE ENCOUNTER
Please review. Protocol Failed; No Protocol    Alprazolam 0.25 mg:    Recent fills: 3/18/2025  Last Rx written: 3/18/2025  Last office visit: 3/18/2025    Zolpidem 10 mg:    Recent fills: 2/11/2025, 3/18/2025  Last Rx written: 3/18/2025  Last office visit: 3/18/2025

## 2025-04-18 NOTE — H&P (VIEW-ONLY)
Follow Up Visit Note    The following individual(s) verbally consented to be recorded using ambient AI listening technology and understand that they can each withdraw their consent to this listening technology at any point by asking the clinician to turn off or pause the recording:    Patient name: Sandra Roche     Active Problems      1. Generalized abdominal pain    2. Constipation, unspecified constipation type          Chief Complaint   Chief Complaint   Patient presents with    [facility]     EP- Diverticulitis, 3/31/25 CT Abd/Pel - reports nausea, abdominal pain and bloating, no BMs for about 2 weeks accept for this morning a pea size BM, no fevers or chills, states not feeling good at all          History of Present Illness  History of Present Illness  Sandra Roche is a 60-year-old female with a history of recurrent/smoldering sigmoid diverticulitis status post robotic low anterior colon resection on 3/20/2024 who returns to clinic with complains of constipation and abdominal discomfort.    She has been experiencing constipation and abdominal discomfort for over a week, with a sensation of fullness and nausea. Despite taking Metamucil, MiraLax, and Mg citrate, which usually help, she has not had a significant bowel movement in almost two weeks, passing only a small amount of stool this morning.    The abdominal discomfort is described as feeling like needles poking her abdomen and was particularly severe last night, with sharp, pin-like pains. She feels bloated and has difficulty passing gas, although there was increased gas after drinking a contrast solution for a CT scan.    A CT scan was performed on March 31, 2025 and was unremarkable. She had a colonoscopy performed by Dr. Coles on 9/7/2023 and this showed diverticulosis. She recalls an episode of dark stool about a month ago but denies any recent vomiting, although she has felt nauseous multiple times, feeling as though she might vomit but not  actually doing so.    She reports that her 'butt part' is painful, describing it as feeling like she wants to use it but can't. No fevers. She is frustrated with her current symptoms and the impact on her daily life.         Allergies  Sandra is allergic to seasonal.    Past Medical / Surgical / Social / Family History    The past medical and past surgical history have been reviewed by me today.    Past Medical History[1]  Past Surgical History[2]    The family history and social history have been reviewed by me today.    Family History[3]  Social Hx on file[4]   Medications - Current[5]     Review of Systems  A 10 point review of systems was performed and negative unless otherwise documented per HPI.     Physical Findings   /72   Pulse 54   Temp 97.3 °F (36.3 °C) (Temporal)   Resp 18   SpO2 94%   Physical Exam  Vitals and nursing note reviewed. Exam conducted with a chaperone present.   Constitutional:       General: She is not in acute distress.  HENT:      Head: Normocephalic and atraumatic.      Mouth/Throat:      Mouth: Mucous membranes are moist.   Cardiovascular:      Rate and Rhythm: Normal rate and regular rhythm.   Pulmonary:      Effort: Pulmonary effort is normal.   Abdominal:      General: There is distension (moderatly distended).      Palpations: Abdomen is soft. There is no mass.      Tenderness: There is abdominal tenderness (Mild tenderness throughout).      Hernia: No hernia is present.      Comments: Well-healed surgical scars   Musculoskeletal:         General: No deformity.   Skin:     General: Skin is warm and dry.   Neurological:      General: No focal deficit present.      Mental Status: She is alert.   Psychiatric:         Mood and Affect: Mood normal.           Results  RADIOLOGY  I have personally reviewed the imaging of patient's CT scan of the abdomen and pelvis from 3/31/2025.  I showed the imaging to the patient at bedside.  No obvious pathology to account for the patient's  abdominal pain and constipation.       Assessment & Plan  Constipation and abdominal pain  Reports a two-week history of constipation with minimal bowel movements, nausea, and abdominal discomfort. CT scan on March 31, 2025 was unremarkable.   - Order stat CT scan to evaluate for obstruction or other abnormalities.  - Consider colonoscopy if CT scan is negative to further investigate the cause of symptoms.  The details of the colonoscopy procedure were discussed including the prep instructions, risks, benefits and alternatives.  Patient expressed understanding and was agreeable to schedule a colonoscopy with me as CT is unremarkable.  Colonoscopy has been tentatively scheduled for 4/22/2025 at [facility].  - Advise to go to the emergency room if symptoms worsen           No orders of the defined types were placed in this encounter.      Imaging & Referrals   CT ABDOMEN+PELVIS(CONTRAST ONLY)(CPT=74177)    Follow Up  No follow-ups on file.    Paulo Kearney MD           [1]   Past Medical History:   Diabetes (HCC)    Diverticulosis of large intestine    Esophageal reflux    Essential hypertension    High blood pressure    High cholesterol    Osteoarthritis    Pneumonia due to organism    Stroke (HCC)    2008    Visual impairment    READING GLASSES   [2]   Past Surgical History:  Procedure Laterality Date    Colectomy  03/20/2024    ROBOTIC LOW ANTERIOR COLON RESECTION    Colonoscopy N/A 09/07/2023    Procedure: COLONOSCOPY;  Surgeon: Evelyn Coles MD;  Location:  ENDOSCOPY    Exploratory of abdomen      Knee replacement surgery     [3]   Family History  Problem Relation Age of Onset    Heart Disorder Father     Cancer Mother     Hypertension Mother    [4]   Social History  Socioeconomic History    Marital status: Legally    Tobacco Use    Smoking status: Every Day     Current packs/day: 0.50     Average packs/day: 0.5 packs/day for 43.3 years (21.6 ttl pk-yrs)     Types: Cigarettes     Start  date: 1982     Passive exposure: Current    Smokeless tobacco: Never    Tobacco comments:     1-3 cigarettes a day      Updated 10/8/24   Vaping Use    Vaping status: Never Used   Substance and Sexual Activity    Alcohol use: Yes     Comment: occasional    Drug use: Never   Other Topics Concern    Caffeine Concern No    Exercise No    Seat Belt Yes    Special Diet No    Stress Concern No    Weight Concern No   [5]   Current Outpatient Medications:     PEG 3350-KCl-Na Bicarb-NaCl 420 g Oral Recon Soln, Starting at 4:00 pm the night before procedure, drink 8 ounces of the prep every 15-20 minutes until finished, Disp: 1 each, Rfl: 0    gabapentin 300 MG Oral Cap, Take 1 capsule (300 mg total) by mouth in the morning and 1 capsule (300 mg total) before bedtime., Disp: 40 capsule, Rfl: 0    zolpidem 10 MG Oral Tab, Take 1 tablet (10 mg total) by mouth nightly as needed., Disp: 30 tablet, Rfl: 0    ALPRAZolam 0.25 MG Oral Tab, Take 1 tablet (0.25 mg total) by mouth daily as needed., Disp: 10 tablet, Rfl: 0    ergocalciferol 1.25 MG (56199 UT) Oral Cap, Take 1 capsule (50,000 Units total) by mouth once a week for 12 doses., Disp: 12 capsule, Rfl: 0    metFORMIN  MG Oral Tablet 24 Hr, Take 1 tablet (500 mg total) by mouth daily., Disp: 90 tablet, Rfl: 3    albuterol 108 (90 Base) MCG/ACT Inhalation Aero Soln, Inhale 2 puffs into the lungs 4 (four) times daily as needed., Disp: 1 each, Rfl: 0    amLODIPine 5 MG Oral Tab, TAKE 1 TABLET(5 MG) BY MOUTH DAILY, Disp: 90 tablet, Rfl: 3    atorvastatin 40 MG Oral Tab, Take 1 tablet (40 mg total) by mouth nightly., Disp: 90 tablet, Rfl: 3    HYDROcodone-acetaminophen (NORCO) 5-325 MG Oral Tab, Take 1 tablet by mouth every 6 (six) hours as needed for Pain., Disp: 15 tablet, Rfl: 0    docusate sodium 100 MG Oral Cap, Take 1 capsule (100 mg total) by mouth 2 (two) times daily., Disp: 60 capsule, Rfl: 2

## 2025-04-20 RX ORDER — ALPRAZOLAM 0.25 MG
0.25 TABLET ORAL
Qty: 10 TABLET | Refills: 0 | Status: SHIPPED | OUTPATIENT
Start: 2025-04-20 | End: 2025-05-21

## 2025-04-20 RX ORDER — ZOLPIDEM TARTRATE 10 MG/1
10 TABLET ORAL NIGHTLY PRN
Qty: 30 TABLET | Refills: 0 | Status: SHIPPED | OUTPATIENT
Start: 2025-04-20 | End: 2025-05-21

## 2025-04-21 ENCOUNTER — ANESTHESIA EVENT (OUTPATIENT)
Dept: ENDOSCOPY | Facility: HOSPITAL | Age: 60
End: 2025-04-21
Payer: MEDICAID

## 2025-04-21 NOTE — ANESTHESIA PREPROCEDURE EVALUATION
PRE-OP EVALUATION    Patient Name: Sandra Roche    Admit Diagnosis: Generalized abdominal pain [R10.84]  Constipation, unspecified constipation type [K59.00]    Pre-op Diagnosis: Generalized abdominal pain [R10.84]  Constipation, unspecified constipation type [K59.00]    COLONOSCOPY    Anesthesia Procedure: COLONOSCOPY    Surgeons and Role:     * Paulo Kearney MD - Primary    Pre-op vitals reviewed.  Temp: 97.6 °F (36.4 °C)  Pulse: 61  Resp: 16  BP: 98/73  SpO2: 100 %  Body mass index is 28.19 kg/m².    Current medications reviewed.  Hospital Medications:  Current Medications[1]    Outpatient Medications:   Prescriptions Prior to Admission[2]    Allergies: Seasonal      Anesthesia Evaluation        Anesthetic Complications           GI/Hepatic/Renal      (+) GERD                           Cardiovascular  Comment: Stress test 6/2024  CONCLUSION: Normal dobutamine myocardial perfusion study with normal wall motion and left ventricular ejection fraction.                  (+) hypertension                                     Endo/Other      (+) diabetes  type 2, not using insulin                         Pulmonary                           Neuro/Psych          (+) CVA and no residual deficits                           Past Surgical History[3]  Social Hx on file[4]  History   Drug Use Unknown     Available pre-op labs reviewed.  Lab Results   Component Value Date    WBC 8.3 03/03/2025    RBC 4.46 03/03/2025    HGB 12.0 03/10/2025    HCT 38.7 03/10/2025    MCV 86.8 03/03/2025    MCH 26.7 03/03/2025    MCHC 30.7 (L) 03/03/2025    RDW 13.9 03/03/2025    .0 03/03/2025     Lab Results   Component Value Date     03/03/2025    K 4.0 03/03/2025     03/03/2025    CO2 27.0 03/03/2025    BUN 9 03/03/2025    CREATSERUM 0.80 03/03/2025     (H) 03/03/2025    CA 9.6 03/03/2025            Airway      Mallampati: II  Mouth opening: 3 FB  TM distance: 4 - 6 cm  Neck ROM: full Cardiovascular      Rhythm:  regular  Rate: normal     Dental    Dentition appears grossly intact  Dental appliance(s): upper dentures and lower dentures       Pulmonary      Breath sounds clear to auscultation bilaterally.               Other findings              ASA: 3   Plan: MAC  NPO status verified and patient meets guidelines.    Post-procedure pain management plan discussed with surgeon and patient.      Plan/risks discussed with: patient                Present on Admission:  **None**             [1]    glucose (Dex4) 15 GM/59ML oral liquid 15 g  15 g Oral Q15 Min PRN    Or    glucose (Glutose) 40% oral gel 15 g  15 g Oral Q15 Min PRN    Or    glucose-vitamin C (Dex-4) chewable tab 4 tablet  4 tablet Oral Q15 Min PRN    Or    dextrose 50% injection 50 mL  50 mL Intravenous Q15 Min PRN    Or    glucose (Dex4) 15 GM/59ML oral liquid 30 g  30 g Oral Q15 Min PRN    Or    glucose (Glutose) 40% oral gel 30 g  30 g Oral Q15 Min PRN    Or    glucose-vitamin C (Dex-4) chewable tab 8 tablet  8 tablet Oral Q15 Min PRN    lactated ringers infusion   Intravenous Continuous   [2]   Facility-Administered Medications Prior to Admission   Medication Dose Route Frequency Provider Last Rate Last Admin    [COMPLETED] triamcinolone acetonide (Kenalog-40) 40 MG/ML injection 40 mg  40 mg Intra-articular Once Laurent Patterson PA-C   40 mg at 04/02/25 1013     Medications Prior to Admission   Medication Sig Dispense Refill Last Dose/Taking    ALPRAZolam 0.25 MG Oral Tab Take 1 tablet (0.25 mg total) by mouth daily as needed. 10 tablet 0 Past Month    zolpidem 10 MG Oral Tab Take 1 tablet (10 mg total) by mouth nightly as needed. 30 tablet 0 4/21/2025    gabapentin 300 MG Oral Cap Take 1 capsule (300 mg total) by mouth in the morning and 1 capsule (300 mg total) before bedtime. 40 capsule 0 Past Week    ergocalciferol 1.25 MG (73358 UT) Oral Cap Take 1 capsule (50,000 Units total) by mouth once a week for 12 doses. 12 capsule 0 4/14/2025    metFORMIN ER  500 MG Oral Tablet 24 Hr Take 1 tablet (500 mg total) by mouth daily. 90 tablet 3 4/21/2025    albuterol 108 (90 Base) MCG/ACT Inhalation Aero Soln Inhale 2 puffs into the lungs 4 (four) times daily as needed. 1 each 0 Past Month    amLODIPine 5 MG Oral Tab TAKE 1 TABLET(5 MG) BY MOUTH DAILY 90 tablet 3 4/21/2025    atorvastatin 40 MG Oral Tab Take 1 tablet (40 mg total) by mouth nightly. 90 tablet 3 4/21/2025    HYDROcodone-acetaminophen (NORCO) 5-325 MG Oral Tab Take 1 tablet by mouth every 6 (six) hours as needed for Pain. 15 tablet 0 Past Week    docusate sodium 100 MG Oral Cap Take 1 capsule (100 mg total) by mouth 2 (two) times daily. 60 capsule 2 4/21/2025    PEG 3350-KCl-Na Bicarb-NaCl 420 g Oral Recon Soln Starting at 4:00 pm the night before procedure, drink 8 ounces of the prep every 15-20 minutes until finished 1 each 0    [3]   Past Surgical History:  Procedure Laterality Date    Colectomy  03/20/2024    ROBOTIC LOW ANTERIOR COLON RESECTION    Colonoscopy N/A 09/07/2023    Procedure: COLONOSCOPY;  Surgeon: Evelyn Coles MD;  Location:  ENDOSCOPY    Exploratory of abdomen      Knee replacement surgery     [4]   Social History  Socioeconomic History    Marital status: Legally    Tobacco Use    Smoking status: Every Day     Current packs/day: 0.50     Average packs/day: 0.5 packs/day for 43.3 years (21.7 ttl pk-yrs)     Types: Cigarettes     Start date: 1982     Passive exposure: Current    Smokeless tobacco: Never    Tobacco comments:     1-3 cigarettes a day      Updated 10/8/24   Vaping Use    Vaping status: Never Used   Substance and Sexual Activity    Alcohol use: Yes     Comment: occasional    Drug use: Never   Other Topics Concern    Caffeine Concern No    Exercise No    Seat Belt Yes    Special Diet No    Stress Concern No    Weight Concern No

## 2025-04-22 ENCOUNTER — ANESTHESIA (OUTPATIENT)
Dept: ENDOSCOPY | Facility: HOSPITAL | Age: 60
End: 2025-04-22
Payer: MEDICAID

## 2025-04-22 ENCOUNTER — HOSPITAL ENCOUNTER (OUTPATIENT)
Facility: HOSPITAL | Age: 60
Setting detail: HOSPITAL OUTPATIENT SURGERY
Discharge: HOME | End: 2025-04-22
Attending: STUDENT IN AN ORGANIZED HEALTH CARE EDUCATION/TRAINING PROGRAM | Admitting: STUDENT IN AN ORGANIZED HEALTH CARE EDUCATION/TRAINING PROGRAM
Payer: MEDICAID

## 2025-04-22 VITALS
SYSTOLIC BLOOD PRESSURE: 95 MMHG | RESPIRATION RATE: 15 BRPM | TEMPERATURE: 98 F | HEIGHT: 67 IN | DIASTOLIC BLOOD PRESSURE: 69 MMHG | OXYGEN SATURATION: 100 % | HEART RATE: 55 BPM | WEIGHT: 180 LBS | BODY MASS INDEX: 28.25 KG/M2

## 2025-04-22 DIAGNOSIS — K59.00 CONSTIPATION, UNSPECIFIED CONSTIPATION TYPE: ICD-10-CM

## 2025-04-22 DIAGNOSIS — R10.84 GENERALIZED ABDOMINAL PAIN: ICD-10-CM

## 2025-04-22 LAB — GLUCOSE BLD-MCNC: 108 MG/DL (ref 70–99)

## 2025-04-22 PROCEDURE — 45380 COLONOSCOPY AND BIOPSY: CPT | Performed by: STUDENT IN AN ORGANIZED HEALTH CARE EDUCATION/TRAINING PROGRAM

## 2025-04-22 RX ORDER — DEXTROSE MONOHYDRATE 25 G/50ML
50 INJECTION, SOLUTION INTRAVENOUS
Status: DISCONTINUED | OUTPATIENT
Start: 2025-04-22 | End: 2025-04-22

## 2025-04-22 RX ORDER — LIDOCAINE HYDROCHLORIDE 10 MG/ML
INJECTION, SOLUTION EPIDURAL; INFILTRATION; INTRACAUDAL; PERINEURAL AS NEEDED
Status: DISCONTINUED | OUTPATIENT
Start: 2025-04-22 | End: 2025-04-22 | Stop reason: SURG

## 2025-04-22 RX ORDER — SODIUM CHLORIDE, SODIUM LACTATE, POTASSIUM CHLORIDE, CALCIUM CHLORIDE 600; 310; 30; 20 MG/100ML; MG/100ML; MG/100ML; MG/100ML
INJECTION, SOLUTION INTRAVENOUS CONTINUOUS
Status: DISCONTINUED | OUTPATIENT
Start: 2025-04-22 | End: 2025-04-22

## 2025-04-22 RX ORDER — NICOTINE POLACRILEX 4 MG
15 LOZENGE BUCCAL
Status: DISCONTINUED | OUTPATIENT
Start: 2025-04-22 | End: 2025-04-22

## 2025-04-22 RX ORDER — NICOTINE POLACRILEX 4 MG
30 LOZENGE BUCCAL
Status: DISCONTINUED | OUTPATIENT
Start: 2025-04-22 | End: 2025-04-22

## 2025-04-22 RX ADMIN — LIDOCAINE HYDROCHLORIDE 25 MG: 10 INJECTION, SOLUTION EPIDURAL; INFILTRATION; INTRACAUDAL; PERINEURAL at 13:53:00

## 2025-04-22 RX ADMIN — SODIUM CHLORIDE, SODIUM LACTATE, POTASSIUM CHLORIDE, CALCIUM CHLORIDE: 600; 310; 30; 20 INJECTION, SOLUTION INTRAVENOUS at 13:43:00

## 2025-04-22 NOTE — OPERATIVE REPORT
[facility]  Operative Note    Sandra Roche Location: OR   CSN [id number] MRN [id number]    1965 Age 60 year old   Admission Date 2025 Operation Date 2025   Attending Physician Paulo Kearney MD Operating Physician Paulo Kearney MD   PCP Mariaelena Valle,           Patient Name: Sandra Roche    Preoperative Diagnosis: Generalized abdominal pain [R10.84]  Constipation, unspecified constipation type [K59.00]    Postoperative Diagnosis:   Transverse colon polyp  Rectal polyp  Diverticulosis  Poor prep    Primary Surgeon: Paulo Kearney MD    Anesthesia: MAC    Procedures: Colonoscopy to the cecum with biopsy forceps polypectomies    Specimen:   Transverse colon polyp  Rectal polyp    Estimated Blood Loss: 1 cc    Complications: None immediate    Condition: Good    Indications for Surgery:   Sandra Roche is a 60-year-old female with a history of recurrent/smoldering sigmoid diverticulitis status post robotic low anterior colon resection on 3/20/2024 who recently returned to clinic with complaints of constipation and abdominal discomfort.    A CT scan was performed on 2025 and was unremarkable aside from residual diverticulosis.  A CT scan was repeated stat on 2025 and was again unremarkable aside from residual diverticulosis.  Given the patient's ongoing abdominal pain and constipation, I recommended planning for a colonoscopy.  The details of the procedure were discussed including the prep instructions, risks, benefits and alternatives.  Patient expressed understanding was agreeable to scheduling colonoscopy.    Patient presents for the colonoscopy procedure today.  She completed the bowel prep.  Consent was signed.  All questions answered.    Surgical Findings:   The quality of the bowel prep was poor.  There was solid stool throughout the colon and rectum that was unable to be suctioned and washed out.  Small and even medium sized polyps could have been missed.    There  was a 3 mm polyp in the transverse colon.  There was a 2 mm polyp in the rectum.  Pandiverticulosis most concentrated in the residual sigmoid and descending colon where there were many largemouth diverticula remaining.  Anastomotic staple line was difficult to identify due to poor prep but there was no evidence of any stricture at the rectosigmoid junction.  No obstructive process in the colon or rectum.    Description of Procedure:   The patient was taken to the endoscopy suite and positioned in the left lateral decubitus position with knees flexed. Monitored anesthesia care was administered. A time-out was performed.    The perineum and perianal skin were examined. A digital rectal examination was performed.  These were both normal. A well-lubricated pediatric colonoscope was then inserted and carefully navigated to the cecum. CO2 insufflation was used throughout the procedure. Advancement was accomplished with some difficulty due to tortuous colon and looping.  This was overcome through abdominal pressure.  The appendiceal orifice and ileocecal valve were identified and photographed. The terminal ileum was not intubated. The scope was then withdrawn as the mucosa was circumferentially examined.     The quality of the bowel prep was poor.  There was solid stool throughout the colon and rectum that was unable to be suctioned and washed out.  Small and even medium sized polyps could have been missed.    There was a 3 mm polyp in the transverse colon.  This was removed and retrieved using cold biopsy forceps.  The polypectomy site was hemostatic.  There was a 2 mm polyp in the rectum.  This was removed and retrieved using cold biopsy forceps.  The polypectomy site was hemostatic.  There was pandiverticulosis most concentrated in the residual sigmoid and descending colon where there were many largemouth diverticula remaining.  The colorectal anastomotic staple line was difficult to identify due to poor prep but there  was no evidence of any stricture at the maykel-rectosigmoid junction.  No obstructive process in the colon or rectum. The scope was straightened and excess gas was suctioned from the colon and the scope removed, terminating the procedure.    Anesthesia was terminated and the patient transported to the recovery unit in good condition. The patient tolerated the procedure well without apparent intraoperative complication.    Follow up:   Patient had a normal colonoscopy with good prep performed by Dr. Coles in 2023.  Patient will need next colonoscopy in 7 years if polyps are adenomatous, in 8 years if polyps are hyperplastic or benign.     No obvious source of the patient's abdominal pain and constipation discovered during this examination. That being said, this exam was limited by poor prep. Patient is welcome to follow-up with me as needed if she continues to struggle with life-limiting abdominal pain and/or constipation.      Paulo Kearney MD  4/22/2025  2:32 PM

## 2025-04-22 NOTE — DISCHARGE INSTRUCTIONS
Home Care Instructions for Colonoscopy with Sedation    Diet:  - Resume your regular diet   - Start with light meals to minimize bloating.  - Do not drink alcohol today.    Medication:  - If you have questions about resuming your normal medications, please contact your Primary Care Physician.    Activities:  - Take it easy today. Do not return to work today.  - Do not drive today.  - Do not operate any machinery today (including kitchen equipment).    Colonoscopy:  - You may notice some rectal \"spotting\" (a little blood on the toilet tissue) for a day or two after the exam. This is normal.  - If you experience any rectal bleeding (not spotting), persistent tenderness or sharp severe abdominal pains, oral temperature over 100 degrees Fahrenheit, light-headedness or dizziness, or any other problems, contact your doctor.    **If unable to reach your doctor, please go to the [facility] Emergency Room**    - Your referring physician will receive a full report of your examination.  - If you do not hear from your doctor's office within two weeks of your biopsy, please call them for your results.

## 2025-04-22 NOTE — ANESTHESIA POSTPROCEDURE EVALUATION
[facility]    Sandra Roche Patient Status:  Hospital Outpatient Surgery   Age/Gender 60 year old female MRN [id number]   Location [facility] ENDOSCOPY [facility] Attending Paulo Kearney MD   Hosp Day # 0 PCP Mariaelena Valle DO       Anesthesia Post-op Note    COLONOSCOPY with forcep polypectomy    Procedure Summary       Date: 04/22/25 Room / Location:  ENDOSCOPY 04 /  ENDOSCOPY    Anesthesia Start: 1342 Anesthesia Stop: 1433    Procedure: COLONOSCOPY with forcep polypectomy Diagnosis:       Generalized abdominal pain      Constipation, unspecified constipation type      (Diverticulosis, poor prep, polyps)    Surgeons: Paulo Kearney MD Anesthesiologist: Jr Partida MD    Anesthesia Type: MAC ASA Status: 3            Anesthesia Type: MAC    Vitals Value Taken Time   BP 84/58 04/22/25 14:34   Temp  04/22/25 14:35   Pulse 71 04/22/25 14:34   Resp 15 04/22/25 14:34   SpO2 95 % 04/22/25 14:34   Vitals shown include unfiled device data.        Patient Location: Endoscopy    Anesthesia Type: MAC    Airway Patency: patent    Postop Pain Control: adequate    Mental Status: mildly sedated but able to meaningfully participate in the post-anesthesia evaluation    Nausea/Vomiting: none    Cardiopulmonary/Hydration status: stable euvolemic    Complications: no apparent anesthesia related complications    Postop vital signs: stable    Dental Exam: Unchanged from Preop    Patient to be discharged from PACU when criteria met.

## 2025-04-22 NOTE — INTERVAL H&P NOTE
Pre-op Diagnosis: Generalized abdominal pain [R10.84]  Constipation, unspecified constipation type [K59.00]    The above referenced H&P was reviewed by Paulo Kearney MD on 4/22/2025, the patient was examined and no significant changes have occurred in the patient's condition since the H&P was performed.  I discussed with the patient and/or legal representative the potential benefits, risks and side effects of this procedure; the likelihood of the patient achieving goals; and potential problems that might occur during recuperation.  I discussed reasonable alternatives to the procedure, including risks, benefits and side effects related to the alternatives and risks related to not receiving this procedure.  We will proceed with procedure as planned.

## 2025-04-25 ENCOUNTER — PATIENT OUTREACH (OUTPATIENT)
Facility: LOCATION | Age: 60
End: 2025-04-25

## 2025-04-28 ENCOUNTER — TELEPHONE (OUTPATIENT)
Facility: CLINIC | Age: 60
End: 2025-04-28

## 2025-04-28 NOTE — TELEPHONE ENCOUNTER
Patient called stating she would like an xray of her right knee because its causing her lots of pain. Please advise for orders for tm appt

## 2025-04-28 NOTE — TELEPHONE ENCOUNTER
[facility] discuss her right knee pain with  FRANKIE Hilario and see if he wants new imaging.  The chronic right knee pain has been addressed multiple office visits since 6/27/23.      Future Appointments   Date Time Provider Department Center   4/29/2025 10:40 AM Laurent Patterson PA-C [facility] [address]   5/6/2025 10:40 AM Laurent Patterson PA-C [facility] [address]   5/7/2025 10:00 AM Germain Jones APRN [id number]   5/13/2025 10:40 AM Laurent Patterson PA-C [facility] [address]

## 2025-04-29 ENCOUNTER — OFFICE VISIT (OUTPATIENT)
Facility: CLINIC | Age: 60
End: 2025-04-29
Payer: MEDICAID

## 2025-04-29 DIAGNOSIS — M17.12 PRIMARY OSTEOARTHRITIS OF LEFT KNEE: Primary | ICD-10-CM

## 2025-04-29 PROCEDURE — 20610 DRAIN/INJ JOINT/BURSA W/O US: CPT | Performed by: PHYSICIAN ASSISTANT

## 2025-04-29 NOTE — PROCEDURES
Patient also with right ankle pain and swelling which no known injury.  Feels pain when weightbearing but no significant tenderness to touch around the ankle.  Suspect underlying osteoarthritis of the ankle and she has an appointment with our podiatry group next week.  I wrapped the ankle with an Ace bandage and recommended that she elevate the leg and apply ice to reduce swelling and inflammation.    Risks and benefits of knee injection discussed with the patient, with risks including but not limited to pain and swelling at the injection site and/or within the knee joint, infection, elevation in blood pressure and/or glucose levels, facial flushing. After informed consent, the patient's left knee was marked, locally anesthetized with skin refrigerant, prepped with topical antiseptic, and injected with 2mL of 16mg/2mL Synvisc through the inferolateral portal.  A band-aid was applied.  The patient tolerated the procedure well.    Laurent Patterson PA-C  [facility]

## 2025-05-06 ENCOUNTER — OFFICE VISIT (OUTPATIENT)
Facility: CLINIC | Age: 60
End: 2025-05-06
Payer: MEDICAID

## 2025-05-06 DIAGNOSIS — M17.12 PRIMARY OSTEOARTHRITIS OF LEFT KNEE: Primary | ICD-10-CM

## 2025-05-06 PROCEDURE — 20610 DRAIN/INJ JOINT/BURSA W/O US: CPT | Performed by: PHYSICIAN ASSISTANT

## 2025-05-06 NOTE — PROCEDURES
Risks and benefits of knee injection discussed with the patient, with risks including but not limited to pain and swelling at the injection site and/or within the knee joint, infection, elevation in blood pressure and/or glucose levels, facial flushing. After informed consent, the patient's left knee was marked, locally anesthetized with skin refrigerant, prepped with topical antiseptic, and injected with 2mL of 16mg/2mL Synvisc through the inferolateral portal.  A band-aid was applied.  The patient tolerated the procedure well.    Laurent Patterson PA-C  [facility]

## 2025-05-07 ENCOUNTER — OFFICE VISIT (OUTPATIENT)
Facility: LOCATION | Age: 60
End: 2025-05-07
Payer: MEDICAID

## 2025-05-07 ENCOUNTER — HOSPITAL ENCOUNTER (OUTPATIENT)
Dept: GENERAL RADIOLOGY | Age: 60
Discharge: HOME | End: 2025-05-07
Payer: MEDICAID

## 2025-05-07 DIAGNOSIS — M25.571 RIGHT ANKLE PAIN, UNSPECIFIED CHRONICITY: ICD-10-CM

## 2025-05-07 DIAGNOSIS — M19.071 ARTHRITIS OF RIGHT ANKLE: ICD-10-CM

## 2025-05-07 DIAGNOSIS — M25.471 ANKLE SWELLING, RIGHT: ICD-10-CM

## 2025-05-07 DIAGNOSIS — M25.571 RIGHT ANKLE PAIN, UNSPECIFIED CHRONICITY: Primary | ICD-10-CM

## 2025-05-07 PROCEDURE — 73610 X-RAY EXAM OF ANKLE: CPT

## 2025-05-07 PROCEDURE — 99213 OFFICE O/P EST LOW 20 MIN: CPT

## 2025-05-07 RX ORDER — METHYLPREDNISOLONE 4 MG/1
TABLET ORAL
Qty: 21 TABLET | Refills: 0 | Status: SHIPPED | OUTPATIENT
Start: 2025-05-07

## 2025-05-07 NOTE — PROGRESS NOTES
[facility]  Progress Note      Sandra Roche is a 60 year old female.   Chief Complaint   Patient presents with    Ankle Pain     Right ankle pain  Onset: 2 weeks  Patient woke up with pain and swelling  Tylenol for pain relief         HPI:     History of Present Illness  Sandra Roche is a 60 year old female who presents with right ankle swelling and pain.    She has been experiencing swelling and pain in her right ankle for the past two weeks. The pain is diffuse across the entire ankle and is accompanied by a sensation of weakness when pushing down against resistance. Patient has been using a cane to assist with walking due to the pain. There was no preceding injury before the swelling began.    The ankle was wrapped initially with an ACE wrap, but she removed the wrap as it felt like it was about to \"burst\". She received an injection in her left knee the previous day and has a history of knee replacement. Occasionally, she experiences shooting pains that travel through her legs, but no current pain in her left knee or calf.    Patient is interested in starting a collagen powder to help with her \"joint pain\". Her current medications include blood pressure medication, atorvastatin, metformin, and zolpidem for sleep. Encouraged patient to reach out to PCP to ensure that the collagen powder does not interfere with her other medications as patient does not have the nutrition information available for me to look at.           Allergies: Seasonal   Current Medications[1]   Past Medical History[2]   Past Surgical History[3]   Family History[4]   Social Hx on file[5]        REVIEW OF SYSTEMS:     10 point ROS completed and was negative, except for pertinent positive and negatives stated in subjective.       EXAM:     GENERAL: well developed, well nourished, in no apparent distress  EXTREMITIES:  1. Integument:  Normal skin temperature and turgor.   2. Vascular: Dorsalis pedis 2/4 bilateral and posterior  tibial pulses 2/4 bilateral, capillary refill normal. Patient has mild swelling throughout ankle.  3. Neurological: Gross sensation intact via light touch bilaterally.  Normal sharp/dull sensation  4. Musculoskeletal: All muscle groups are graded 5/5 in the foot and ankle. Patient does not have any pain with palpation of the ankle, but does mention that the pain is worse with ambulating.        ASSESSMENT AND PLAN:   Diagnoses and all orders for this visit:    Right ankle pain, unspecified chronicity  -     XR ANKLE (MIN 3 VIEWS), RIGHT (CPT=73610); Future  -     DME - External    Other orders  -     methylPREDNISolone 4 MG Oral Tablet Therapy Pack; Take per package insert (instructions).      Plan:   -Patient was seen and evaluated today in clinic.  Chart history reviewed.  Assessment & Plan  Right ankle arthritis    X-rays obtained and reviewed.  XR ANKLE (MIN 3 VIEWS), RIGHT (CPT=73610)  Result Date: 5/7/2025  CONCLUSION:  Mild osteoarthritic changes are present. No acute fracture or other acute bony process.   LOCATION:  [facility]   Dictated by (CST): Jarad Soni MD on 5/07/2025 at 10:27 AM     Finalized by (CST): Jarad Soni MD on 5/07/2025 at 10:28 AM       Chronic mild right ankle arthritis with joint space narrowing on X-ray. Conservative treatment discussed, injection considered if no improvement in two weeks.  - Prescribe Medrol Dosepak.   - Provide ankle brace.  - Advise ankle sleeve for compression.  - Instruct to elevate foot.  - Follow-up in two weeks.  - Contact via [facility] if symptoms worsen for possible injection by Dr. Dudley.  - Advise consultation with primary care provider regarding collagen peptide supplement.        -All of the patient's questions and concerns were addressed.  They indicated their understanding of these issues and agrees to the plan.    Time spent reviewing pertinent information from patient's chart, reviewing any pertinent imaging, obtaining history and physical exam,  discussing and mutually agreeing on a treatment plan, and documenting encounter: 20 minutes    RTC 2 weeks      PRIYANKA Bray        [facility]            Dragon speech recognition software was used to prepare this note.  Errors in word recognition may occur.  Please contact me with any questions/concerns with this note.          [1]   Current Outpatient Medications   Medication Sig Dispense Refill    methylPREDNISolone 4 MG Oral Tablet Therapy Pack Take per package insert (instructions). 21 tablet 0    ALPRAZolam 0.25 MG Oral Tab Take 1 tablet (0.25 mg total) by mouth daily as needed. 10 tablet 0    zolpidem 10 MG Oral Tab Take 1 tablet (10 mg total) by mouth nightly as needed. 30 tablet 0    PEG 3350-KCl-Na Bicarb-NaCl 420 g Oral Recon Soln Starting at 4:00 pm the night before procedure, drink 8 ounces of the prep every 15-20 minutes until finished 1 each 0    gabapentin 300 MG Oral Cap Take 1 capsule (300 mg total) by mouth in the morning and 1 capsule (300 mg total) before bedtime. 40 capsule 0    ergocalciferol 1.25 MG (45933 UT) Oral Cap Take 1 capsule (50,000 Units total) by mouth once a week for 12 doses. 12 capsule 0    metFORMIN  MG Oral Tablet 24 Hr Take 1 tablet (500 mg total) by mouth daily. 90 tablet 3    albuterol 108 (90 Base) MCG/ACT Inhalation Aero Soln Inhale 2 puffs into the lungs 4 (four) times daily as needed. 1 each 0    amLODIPine 5 MG Oral Tab TAKE 1 TABLET(5 MG) BY MOUTH DAILY 90 tablet 3    atorvastatin 40 MG Oral Tab Take 1 tablet (40 mg total) by mouth nightly. 90 tablet 3    HYDROcodone-acetaminophen (NORCO) 5-325 MG Oral Tab Take 1 tablet by mouth every 6 (six) hours as needed for Pain. 15 tablet 0    docusate sodium 100 MG Oral Cap Take 1 capsule (100 mg total) by mouth 2 (two) times daily. 60 capsule 2   [2]   Past Medical History:   Diabetes (HCC)    Diverticulosis of large intestine    Esophageal reflux    Essential hypertension    High blood  pressure    High cholesterol    Osteoarthritis    Pneumonia due to organism    Stroke (HCC)    2008    Visual impairment    READING GLASSES   [3]   Past Surgical History:  Procedure Laterality Date    Colectomy  03/20/2024    ROBOTIC LOW ANTERIOR COLON RESECTION    Colonoscopy N/A 09/07/2023    Procedure: COLONOSCOPY;  Surgeon: Evelyn Coles MD;  Location:  ENDOSCOPY    Colonoscopy N/A 4/22/2025    Procedure: COLONOSCOPY with forcep polypectomy;  Surgeon: Paulo Kearney MD;  Location:  ENDOSCOPY    Exploratory of abdomen      Knee replacement surgery     [4]   Family History  Problem Relation Age of Onset    Heart Disorder Father     Cancer Mother     Hypertension Mother    [5]   Social History  Socioeconomic History    Marital status: Legally    Tobacco Use    Smoking status: Every Day     Current packs/day: 0.50     Average packs/day: 0.5 packs/day for 43.3 years (21.7 ttl pk-yrs)     Types: Cigarettes     Start date: 1982     Passive exposure: Current    Smokeless tobacco: Never    Tobacco comments:     1-3 cigarettes a day      Updated 4/29/25   Vaping Use    Vaping status: Never Used   Substance and Sexual Activity    Alcohol use: Yes     Comment: occasional    Drug use: Never   Other Topics Concern    Caffeine Concern No    Exercise No    Seat Belt Yes    Special Diet No    Stress Concern No    Weight Concern No

## 2025-05-12 ENCOUNTER — TELEPHONE (OUTPATIENT)
Facility: CLINIC | Age: 60
End: 2025-05-12

## 2025-05-12 NOTE — TELEPHONE ENCOUNTER
Patient saw Joanne MAST ob 5/7/25 for right ankle pain. Steroid injection with Dr Dudley was discussed if pain persists. Please advise if patient could be seen on Friday in one of the yellow spots for steroid injection

## 2025-05-12 NOTE — TELEPHONE ENCOUNTER
Left message to call back for patient    If patient returns call- please offer open 15 minute follow up spot with Dr Dudley on Friday in one of yellow openings. 8:00, 8:15, 8:45, 9:15 if still open

## 2025-05-12 NOTE — TELEPHONE ENCOUNTER
Patient saw Danica last week and she is in a lot of pain in her right ankle.  Patient would like to come in this week and get an injection from Dr. Dudley.  Danica talked to patient about the injection last visit.   Please advise.

## 2025-05-13 ENCOUNTER — OFFICE VISIT (OUTPATIENT)
Facility: CLINIC | Age: 60
End: 2025-05-13
Payer: MEDICAID

## 2025-05-13 DIAGNOSIS — M17.12 PRIMARY OSTEOARTHRITIS OF LEFT KNEE: Primary | ICD-10-CM

## 2025-05-13 PROCEDURE — 20610 DRAIN/INJ JOINT/BURSA W/O US: CPT | Performed by: PHYSICIAN ASSISTANT

## 2025-05-13 NOTE — PROCEDURES
Risks and benefits of knee injection discussed with the patient, with risks including but not limited to pain and swelling at the injection site and/or within the knee joint, infection, elevation in blood pressure and/or glucose levels, facial flushing. After informed consent, the patient's left knee was marked, locally anesthetized with skin refrigerant, prepped with topical antiseptic, and injected with 2mL of 16mg/2mL Synvisc through the inferolateral portal.  A band-aid was applied.  The patient tolerated the procedure well.    Laurent Patterson PA-C  [facility]

## 2025-05-13 NOTE — TELEPHONE ENCOUNTER
Patient is scheduled for Friday.    Future Appointments   Date Time Provider Department Center   5/13/2025 10:40 AM Laurent Patterson PA-C [facility] [address]   5/16/2025  8:45 AM Dustin Dudley DPM [id number]

## 2025-05-16 ENCOUNTER — OFFICE VISIT (OUTPATIENT)
Facility: LOCATION | Age: 60
End: 2025-05-16
Payer: MEDICAID

## 2025-05-16 VITALS — SYSTOLIC BLOOD PRESSURE: 122 MMHG | DIASTOLIC BLOOD PRESSURE: 62 MMHG

## 2025-05-16 DIAGNOSIS — M25.571 RIGHT ANKLE PAIN, UNSPECIFIED CHRONICITY: ICD-10-CM

## 2025-05-16 DIAGNOSIS — M25.471 ANKLE SWELLING, RIGHT: ICD-10-CM

## 2025-05-16 DIAGNOSIS — E11.9 TYPE 2 DIABETES MELLITUS WITHOUT COMPLICATION, WITHOUT LONG-TERM CURRENT USE OF INSULIN (HCC): ICD-10-CM

## 2025-05-16 DIAGNOSIS — R26.9 GAIT DIFFICULTY: ICD-10-CM

## 2025-05-16 DIAGNOSIS — M19.071 ARTHRITIS OF RIGHT ANKLE: Primary | ICD-10-CM

## 2025-05-16 PROCEDURE — 20605 DRAIN/INJ JOINT/BURSA W/O US: CPT | Performed by: PODIATRIST

## 2025-05-16 PROCEDURE — 99213 OFFICE O/P EST LOW 20 MIN: CPT | Performed by: PODIATRIST

## 2025-05-16 RX ORDER — DEXAMETHASONE SODIUM PHOSPHATE 10 MG/ML
10 INJECTION, SOLUTION INTRA-ARTICULAR; INTRALESIONAL; INTRAMUSCULAR; INTRAVENOUS; SOFT TISSUE ONCE
Status: COMPLETED | OUTPATIENT
Start: 2025-05-16 | End: 2025-05-16

## 2025-05-16 RX ADMIN — DEXAMETHASONE SODIUM PHOSPHATE 10 MG: 10 INJECTION, SOLUTION INTRA-ARTICULAR; INTRALESIONAL; INTRAMUSCULAR; INTRAVENOUS; SOFT TISSUE at 08:36:00

## 2025-05-16 NOTE — PROGRESS NOTES
[facility]  Progress Note      Sandra Roche is a 60 year old female.   Chief Complaint   Patient presents with    Procedure     Right ankle steroid injection         HPI:     The following individual(s) verbally consented to be recorded using ambient AI listening technology and understand that they can each withdraw their consent to this listening technology at any point by asking the clinician to turn off or pause the recording:      History of Present Illness  Sandra Roche is a 60 year old female with diabetes who presents with right ankle pain.    She experiences sharp pain in her right ankle, which occurs both when walking and sitting. The pain is persistent throughout the day and night and is described as a sharp sensation on both sides of the ankle. Despite being diagnosed with very mild arthritis on a recent x-ray, the pain significantly impacts her mobility.  She is utilizing a cane today for gait assistance    She wants an injection to potentially help her walk better. Her hemoglobin A1c was 7.0% on 3/3/2025.    In the past, she had a toenail removed due to a fungal infection on 12/10/2024, and the toenail is slowly growing back. No current pain or signs of infection associated with the toenail area.      Allergies: Seasonal   Current Medications[1]   Past Medical History[2]   Past Surgical History[3]   Family History[4]   Social Hx on file[5]        REVIEW OF SYSTEMS:     10 point ROS completed and was negative unless stated in HPI.      EXAM:     Right lower extremity focused exam:  GENERAL: well developed, well nourished, in no apparent distress  EXTREMITIES:  1. Integument: Toenails of right foot are currently pain with nail polish and it appears that about one half of the right hallux toenail is growing back with no concerns.  Skin appears moist, warm, and supple. There are no color changes. No open lesions. No macerations. No Hyperkeratotic lesions.   2. Vascular: Dorsalis pedis 2/4 and  posterior tibial pulse 2/4, capillary refill normal.  Some mild localized edema surrounding the right ankle joint  3. Neurological: Gross sensation intact via light touch bilaterally.  Normal sharp/dull sensation  4. Musculoskeletal: All muscle groups are graded 4/5 in the foot and ankle with no pain elicited.  Pain with palpation within the anterior medial ankle gutters of the right ankle mild decrease in ankle joint dorsiflexion with no crepitus noted throughout range of motion.  Mild discomfort elicited at end range of motion of right ankle.  No acute deformities noted.    XR ANKLE (MIN 3 VIEWS), RIGHT (CPT=73610)  Result Date: 5/7/2025  CONCLUSION:  Mild osteoarthritic changes are present. No acute fracture or other acute bony process.   LOCATION:  [facility]   Dictated by (CST): Jarad Soni MD on 5/07/2025 at 10:27 AM     Finalized by (CST): Jarad Soni MD on 5/07/2025 at 10:28 AM          ASSESSMENT AND PLAN:   Diagnoses and all orders for this visit:    Arthritis of right ankle    Ankle swelling, right    Right ankle pain, unspecified chronicity    Type 2 diabetes mellitus without complication, without long-term current use of insulin (HCC)    Gait difficulty        Plan:   -Patient was seen and evaluated today in clinic.  Chart history reviewed.    Assessment & Plan  Mild arthritis of right ankle  Mild arthritis with persistent pain. Discussed steroid injection benefits and risks, including potential hyperglycemia.  - Administer steroid injection to right ankle joint.  - Advise rest and limited weight-bearing on the right ankle over the weekend.  - Instruct on icing, resting, and elevating the ankle to manage potential steroid flare-up.  - Recommend over-the-counter anti-inflammatory medication as needed.  - Schedule follow-up appointment in one month to assess progress.    Type 2 diabetes mellitus  Type 2 diabetes with hemoglobin A1c around 7. Discussed steroid impact on blood glucose.  - Instruct to  monitor blood glucose levels over the weekend.  - Advise to avoid large sugar intakes to prevent hyperglycemia.      PROCEDURE: 20605-steroid injection of right ankle  After reviewing options for cortisone injection and risks/potential complications, pt has agreed to proceed.  Using aseptic technique, injection was then given using 1cc of 10mg/ml Kenalog, 1cc 10mg/ml Dexamethasone, mixed with 1 cc of 0.5% marcaine plain using a 25 gauge 1.5\" needle.  Site of infiltration -right ankle joint  Site covered with sterile bandaid  Pt tolerated procedure well and no complications encountered.      -All of the patient's questions and concerns were addressed.  They indicated their understanding of these issues and agrees to the plan.      RTC 4 weeks    Wade Dudley DPM, AACFAS        5/16/2025    [address] [address]   [email or web address]            Dragon speech recognition software was used to prepare this note.  Errors in word recognition may occur.  Please contact me with any questions/concerns with this note.        [1]   Current Outpatient Medications   Medication Sig Dispense Refill    methylPREDNISolone 4 MG Oral Tablet Therapy Pack Take per package insert (instructions). 21 tablet 0    ALPRAZolam 0.25 MG Oral Tab Take 1 tablet (0.25 mg total) by mouth daily as needed. 10 tablet 0    zolpidem 10 MG Oral Tab Take 1 tablet (10 mg total) by mouth nightly as needed. 30 tablet 0    PEG 3350-KCl-Na Bicarb-NaCl 420 g Oral Recon Soln Starting at 4:00 pm the night before procedure, drink 8 ounces of the prep every 15-20 minutes until finished 1 each 0    gabapentin 300 MG Oral Cap Take 1 capsule (300 mg total) by mouth in the morning and 1 capsule (300 mg total) before bedtime. 40 capsule 0    ergocalciferol 1.25 MG (66182 UT) Oral Cap Take 1 capsule (50,000 Units total) by mouth once a week for 12 doses. 12 capsule 0    metFORMIN  MG Oral Tablet 24 Hr  Take 1 tablet (500 mg total) by mouth daily. 90 tablet 3    albuterol 108 (90 Base) MCG/ACT Inhalation Aero Soln Inhale 2 puffs into the lungs 4 (four) times daily as needed. 1 each 0    amLODIPine 5 MG Oral Tab TAKE 1 TABLET(5 MG) BY MOUTH DAILY 90 tablet 3    atorvastatin 40 MG Oral Tab Take 1 tablet (40 mg total) by mouth nightly. 90 tablet 3    HYDROcodone-acetaminophen (NORCO) 5-325 MG Oral Tab Take 1 tablet by mouth every 6 (six) hours as needed for Pain. 15 tablet 0    docusate sodium 100 MG Oral Cap Take 1 capsule (100 mg total) by mouth 2 (two) times daily. 60 capsule 2   [2]   Past Medical History:   Diabetes (HCC)    Diverticulosis of large intestine    Esophageal reflux    Essential hypertension    High blood pressure    High cholesterol    Osteoarthritis    Pneumonia due to organism    Stroke (HCC)    2008    Visual impairment    READING GLASSES   [3]   Past Surgical History:  Procedure Laterality Date    Colectomy  03/20/2024    ROBOTIC LOW ANTERIOR COLON RESECTION    Colonoscopy N/A 09/07/2023    Procedure: COLONOSCOPY;  Surgeon: Evelyn Coles MD;  Location:  ENDOSCOPY    Colonoscopy N/A 4/22/2025    Procedure: COLONOSCOPY with forcep polypectomy;  Surgeon: Paulo Kearney MD;  Location:  ENDOSCOPY    Exploratory of abdomen      Knee replacement surgery     [4]   Family History  Problem Relation Age of Onset    Heart Disorder Father     Cancer Mother     Hypertension Mother    [5]   Social History  Socioeconomic History    Marital status: Legally    Tobacco Use    Smoking status: Every Day     Current packs/day: 0.50     Average packs/day: 0.5 packs/day for 43.4 years (21.7 ttl pk-yrs)     Types: Cigarettes     Start date: 1982     Passive exposure: Current    Smokeless tobacco: Never    Tobacco comments:     1-3 cigarettes a day      Updated 4/29/25   Vaping Use    Vaping status: Never Used   Substance and Sexual Activity    Alcohol use: Yes     Comment: occasional    Drug use:  Never   Other Topics Concern    Caffeine Concern No    Exercise No    Seat Belt Yes    Special Diet No    Stress Concern No    Weight Concern No

## 2025-05-19 DIAGNOSIS — G47.00 INSOMNIA, UNSPECIFIED TYPE: ICD-10-CM

## 2025-05-19 DIAGNOSIS — F41.9 ANXIETY: ICD-10-CM

## 2025-05-21 RX ORDER — ZOLPIDEM TARTRATE 10 MG/1
10 TABLET ORAL NIGHTLY PRN
Qty: 30 TABLET | Refills: 0 | Status: SHIPPED | OUTPATIENT
Start: 2025-05-21 | End: 2025-08-12

## 2025-05-21 RX ORDER — ALPRAZOLAM 0.25 MG
0.25 TABLET ORAL
Qty: 10 TABLET | Refills: 0 | Status: SHIPPED | OUTPATIENT
Start: 2025-05-21 | End: 2025-08-12

## 2025-06-17 ENCOUNTER — OFFICE VISIT (OUTPATIENT)
Facility: LOCATION | Age: 60
End: 2025-06-17
Payer: MEDICAID

## 2025-06-17 DIAGNOSIS — M19.071 ARTHRITIS OF RIGHT ANKLE: Primary | ICD-10-CM

## 2025-06-17 DIAGNOSIS — R26.9 GAIT DIFFICULTY: ICD-10-CM

## 2025-06-17 DIAGNOSIS — R29.898 WEAKNESS OF FOOT, RIGHT: ICD-10-CM

## 2025-06-17 DIAGNOSIS — E55.9 VITAMIN D INSUFFICIENCY: ICD-10-CM

## 2025-06-17 DIAGNOSIS — M25.571 RIGHT ANKLE PAIN, UNSPECIFIED CHRONICITY: ICD-10-CM

## 2025-06-17 DIAGNOSIS — M25.374 FOOT JOINT INSTABILITY, RIGHT: ICD-10-CM

## 2025-06-17 DIAGNOSIS — M25.471 ANKLE SWELLING, RIGHT: ICD-10-CM

## 2025-06-17 DIAGNOSIS — E11.9 TYPE 2 DIABETES MELLITUS WITHOUT COMPLICATION, WITHOUT LONG-TERM CURRENT USE OF INSULIN (HCC): ICD-10-CM

## 2025-06-17 PROCEDURE — 99213 OFFICE O/P EST LOW 20 MIN: CPT | Performed by: PODIATRIST

## 2025-06-17 NOTE — PROGRESS NOTES
[facility]  Progress Note      Sandra Roche is a 60 year old female.   Chief Complaint   Patient presents with    Follow - Up     Right ankle steroid injection follow up  Patient is still having pain but steroid injection helped         HPI:     The following individual(s) verbally consented to be recorded using ambient AI listening technology and understand that they can each withdraw their consent to this listening technology at any point by asking the clinician to turn off or pause the recording:      History of Present Illness  Sandra Roche is a 60 year old female with diabetes who returns to clinic for recheck on right ankle.    She experiences persistent ankle pain, primarily at the front and outside of the ankle, which has slightly improved since her last visit. The improvement is attributed to a previous injection that was very effective. She inquires about the possibility of receiving another injection, as past injections have been beneficial.    She also receives injections in her knees, indicating a history of joint issues beyond the ankle.    Her flat foot condition exacerbates her ankle pain, especially when wearing unsupportive shoes.  She is currently ambulating in [place] today.      She uses a compression brace for her ankle, which she finds beneficial, though she was not wearing it at the time of the visit. She requests an additional compression sleeve as her current one is falling apart.      Allergies: Seasonal   Current Medications[1]   Past Medical History[2]   Past Surgical History[3]   Family History[4]   Social Hx on file[5]        REVIEW OF SYSTEMS:     10 point ROS completed and was negative unless stated in HPI.      EXAM:     Right lower extremity focused exam:  GENERAL: well developed, well nourished, in no apparent distress  EXTREMITIES:  1. Integument: Skin appears moist, warm, and supple. There are no color changes. No open lesions. No macerations. No Hyperkeratotic  lesions.   2. Vascular: Dorsalis pedis 2/4 and posterior tibial pulse 2/4, capillary refill normal.  Some mild localized edema surrounding the right ankle joint  3. Neurological: Gross sensation intact via light touch bilaterally.  Normal sharp/dull sensation  4. Musculoskeletal: All muscle groups are graded 4/5 in the foot and ankle with no pain elicited.  Pain with palpation within the anterior, lateral, and medial ankle gutters of the right ankle.  Mild decrease in ankle joint dorsiflexion with no crepitus noted throughout range of motion.  Mild discomfort elicited at end range of motion of right ankle.  No acute deformities noted.  Midtarsal joint instability is noted with overall decrease in medial longitudinal arch noted upon ambulation     XR ANKLE (MIN 3 VIEWS), RIGHT (CPT=73610)  Result Date: 5/7/2025  CONCLUSION:  Mild osteoarthritic changes are present. No acute fracture or other acute bony process.   LOCATION:  [facility]   Dictated by (CST): Jarad Soni MD on 5/07/2025 at 10:27 AM     Finalized by (CST): Jarad Soni MD on 5/07/2025 at 10:28 AM          ASSESSMENT AND PLAN:   Diagnoses and all orders for this visit:    Arthritis of right ankle    Ankle swelling, right    Foot joint instability, right    Right ankle pain, unspecified chronicity    Type 2 diabetes mellitus without complication, without long-term current use of insulin (HCC)    Weakness of foot, right    Gait difficulty        Plan:   -Patient was seen and evaluated today in clinic.  Chart history reviewed.    Assessment & Plan  Arthritis of right ankle  Patient had improvements with injection during last visit.  Pain has returned, but less intense.  - Reviewed injection protocol with patient.  Will need to wait 3-4 months before another injection  - Recommend over-the-counter supportive shoe inserts available on [employer].  - Provide information on supportive shoe gear, including running shoes like New Balance and Rao.  - Provide a new  compression sleeve for additional support.  - Initiate an at home foot and ankle conditioning program to improve range of motion, stretching, and strengthening.  - Consider physical therapy if pain does not improve with current measures.  - Schedule follow-up if pain worsens or does not improve over the next few months to consider another injection.      Flat foot (pes planus)  Generalized flat foot contributes to ankle pain, exacerbated by inadequate footwear. The patient feels more supported when wearing the compression brace.   - See above    Diabetes mellitus  Diabetes mellitus impacts blood glucose levels, especially with corticosteroid injections, which can cause hyperglycemia.  - Advise monitoring blood glucose levels closely, especially after receiving corticosteroid injections.  - Discuss the importance of maintaining blood glucose control to prevent complications.      -All of the patient's questions and concerns were addressed.  They indicated their understanding of these issues and agrees to the plan.    Time spent reviewing pertinent information from patient's chart, reviewing any pertinent imaging, obtaining history and physical exam, discussing and mutually agreeing on a treatment plan, and documenting encounter: 20 minutes    RTC 2 months or sooner if needed    Wade Dudley DPM, AACFAS        6/17/2025    [facility]  [address] [place] [id number]   [email or web address]            Dragon speech recognition software was used to prepare this note.  Errors in word recognition may occur.  Please contact me with any questions/concerns with this note.        [1]   Current Outpatient Medications   Medication Sig Dispense Refill    ALPRAZOLAM 0.25 MG Oral Tab TAKE 1 TABLET(0.25 MG) BY MOUTH DAILY AS NEEDED 10 tablet 0    ZOLPIDEM 10 MG Oral Tab TAKE 1 TABLET(10 MG) BY MOUTH EVERY NIGHT AS NEEDED 30 tablet 0    methylPREDNISolone 4 MG Oral Tablet Therapy Pack Take  per package insert (instructions). 21 tablet 0    PEG 3350-KCl-Na Bicarb-NaCl 420 g Oral Recon Soln Starting at 4:00 pm the night before procedure, drink 8 ounces of the prep every 15-20 minutes until finished 1 each 0    gabapentin 300 MG Oral Cap Take 1 capsule (300 mg total) by mouth in the morning and 1 capsule (300 mg total) before bedtime. 40 capsule 0    metFORMIN  MG Oral Tablet 24 Hr Take 1 tablet (500 mg total) by mouth daily. 90 tablet 3    albuterol 108 (90 Base) MCG/ACT Inhalation Aero Soln Inhale 2 puffs into the lungs 4 (four) times daily as needed. 1 each 0    amLODIPine 5 MG Oral Tab TAKE 1 TABLET(5 MG) BY MOUTH DAILY 90 tablet 3    atorvastatin 40 MG Oral Tab Take 1 tablet (40 mg total) by mouth nightly. 90 tablet 3    HYDROcodone-acetaminophen (NORCO) 5-325 MG Oral Tab Take 1 tablet by mouth every 6 (six) hours as needed for Pain. 15 tablet 0    docusate sodium 100 MG Oral Cap Take 1 capsule (100 mg total) by mouth 2 (two) times daily. 60 capsule 2   [2]   Past Medical History:   Diabetes (HCC)    Diverticulosis of large intestine    Esophageal reflux    Essential hypertension    High blood pressure    High cholesterol    Osteoarthritis    Pneumonia due to organism    Stroke (HCC)    2008    Visual impairment    READING GLASSES   [3]   Past Surgical History:  Procedure Laterality Date    Colectomy  03/20/2024    ROBOTIC LOW ANTERIOR COLON RESECTION    Colonoscopy N/A 09/07/2023    Procedure: COLONOSCOPY;  Surgeon: Evelyn Coles MD;  Location:  ENDOSCOPY    Colonoscopy N/A 4/22/2025    Procedure: COLONOSCOPY with forcep polypectomy;  Surgeon: Paulo Kearney MD;  Location:  ENDOSCOPY    Exploratory of abdomen      Knee replacement surgery     [4]   Family History  Problem Relation Age of Onset    Heart Disorder Father     Cancer Mother     Hypertension Mother    [5]   Social History  Socioeconomic History    Marital status: Legally    Tobacco Use    Smoking status: Every  Day     Current packs/day: 0.50     Average packs/day: 0.5 packs/day for 43.5 years (21.7 ttl pk-yrs)     Types: Cigarettes     Start date: 1982     Passive exposure: Current    Smokeless tobacco: Never    Tobacco comments:     1-3 cigarettes a day      Updated 4/29/25   Vaping Use    Vaping status: Never Used   Substance and Sexual Activity    Alcohol use: Yes     Comment: occasional    Drug use: Never   Other Topics Concern    Caffeine Concern No    Exercise No    Seat Belt Yes    Special Diet No    Stress Concern No    Weight Concern No

## 2025-06-21 RX ORDER — ERGOCALCIFEROL 1.25 MG/1
50000 CAPSULE, LIQUID FILLED ORAL WEEKLY
Qty: 6 CAPSULE | Refills: 0 | Status: SHIPPED | OUTPATIENT
Start: 2025-06-21

## 2025-06-23 NOTE — TELEPHONE ENCOUNTER
Spoke to patient and advised of new prescription and repeat lab in 6 weeks. Patient verbalizes understanding

## 2025-06-25 DIAGNOSIS — K57.32 SIGMOID DIVERTICULITIS: ICD-10-CM

## 2025-06-25 RX ORDER — DOCUSATE SODIUM 100 MG/1
100 CAPSULE, LIQUID FILLED ORAL 2 TIMES DAILY
Qty: 60 CAPSULE | Refills: 2 | Status: SHIPPED | OUTPATIENT
Start: 2025-06-25

## 2025-07-07 DIAGNOSIS — E11.9 TYPE 2 DIABETES MELLITUS WITHOUT COMPLICATION, WITHOUT LONG-TERM CURRENT USE OF INSULIN (HCC): ICD-10-CM

## 2025-07-07 DIAGNOSIS — E78.2 MIXED HYPERLIPIDEMIA: ICD-10-CM

## 2025-07-10 RX ORDER — ATORVASTATIN CALCIUM 40 MG/1
40 TABLET, FILM COATED ORAL NIGHTLY
Qty: 90 TABLET | Refills: 3 | Status: SHIPPED | OUTPATIENT
Start: 2025-07-10

## 2025-07-14 ENCOUNTER — OFFICE VISIT (OUTPATIENT)
Facility: CLINIC | Age: 60
End: 2025-07-14
Payer: MEDICAID

## 2025-07-14 VITALS — HEIGHT: 67 IN | BODY MASS INDEX: 28.25 KG/M2 | WEIGHT: 180 LBS

## 2025-07-14 DIAGNOSIS — M17.12 PRIMARY OSTEOARTHRITIS OF LEFT KNEE: Primary | ICD-10-CM

## 2025-07-14 RX ORDER — TRIAMCINOLONE ACETONIDE 40 MG/ML
40 INJECTION, SUSPENSION INTRA-ARTICULAR; INTRAMUSCULAR ONCE
Status: COMPLETED | OUTPATIENT
Start: 2025-07-14 | End: 2025-07-14

## 2025-07-14 RX ADMIN — TRIAMCINOLONE ACETONIDE 40 MG: 40 INJECTION, SUSPENSION INTRA-ARTICULAR; INTRAMUSCULAR at 11:04:00

## 2025-07-14 NOTE — PROCEDURES
Risks and benefits of knee injection discussed with the patient, with risks including but not limited to pain and swelling at the injection site and/or within the knee joint, infection, elevation in blood pressure and/or glucose levels, facial flushing. After informed consent, the patient's left knee was marked, locally anesthetized with skin refrigerant, prepped with topical antiseptic, and injected with a mixture of 1mL 40mg/mL Kenalog, 2mL 1% lidocaine and 2mL 0.5% marcaine through the inferolateral portal.  A band-aid was applied.  The patient tolerated the procedure well.    Laurent Patterson PA-C  [facility]

## 2025-07-22 ENCOUNTER — OFFICE VISIT (OUTPATIENT)
Facility: LOCATION | Age: 60
End: 2025-07-22
Payer: MEDICAID

## 2025-07-22 DIAGNOSIS — R13.10 DYSPHAGIA, UNSPECIFIED TYPE: Primary | ICD-10-CM

## 2025-07-22 PROCEDURE — 99203 OFFICE O/P NEW LOW 30 MIN: CPT | Performed by: STUDENT IN AN ORGANIZED HEALTH CARE EDUCATION/TRAINING PROGRAM

## 2025-07-22 PROCEDURE — 31575 DIAGNOSTIC LARYNGOSCOPY: CPT | Performed by: STUDENT IN AN ORGANIZED HEALTH CARE EDUCATION/TRAINING PROGRAM

## 2025-07-22 NOTE — PROGRESS NOTES
Sandra Roche is a 60 year old female.   Chief Complaint   Patient presents with    Throat Problem     Throat swelling and phlegm x1 mth     HPI:   60 year old female presenting for evaluation of dysphagia onset 2 months ago. Occurs with solids and liquids. Reports subjective weight loss but has not weighed herself (clothes fit slightly looser). Also reports occasional globus and also reports hoarseness. Pt smokes 3 cigarettes/day, has smoked for 30 years.  She is worried about a cancer diagnosis given her cousin was recently diagnosed with cancer.        Current Medications[1]   Past Medical History[2]   Social History:  Short Social Hx on File[3]   Past Surgical History[4]      REVIEW OF SYSTEMS:   GENERAL HEALTH: feels well otherwise  GENERAL : denies fever, chills, sweats, weight loss, weight gain  SKIN: denies any unusual skin lesions or rashes  RESPIRATORY: denies shortness of breath with exertion  NEURO: denies headaches    EXAM:   There were no vitals taken for this visit.    System Findings Details   Constitutional  Overall appearance - Normal.   Head/Face  Facial features -- Normal. Skull - Normal.   Eyes  Sclera white, pupils equal and round, EOMI   Ears  External ears normal in appearance, right EAC patent, TM intact without effusion, left EAC occluded with cerumen   Nose  External nose normal in appearance, nares patent, no epistaxis   Throat  Posterior pharynx clear, uvula midline, tonsils 1+   Oral cavity  Lips normal in appearance, mucous membranes clear, no masses, FOM soft, tongue without lesions   Neck  Trachea midline, no lymphadenopathy, no masses   Neurological  Memory - Normal. Cranial nerves - Cranial nerves II through XII grossly intact.     Indication: dysphagia, hoarseness  Surgeon: Ailyn Sandra MD, PERLA    Flexible fiberoptic exam  Verbal consent was obtained. Lidocaine and afrin nasal spray was administered. The flexible fiberoptic laryngoscope was lubricated and inserted into the  right nare. The nasal mucosa was pink and healthy appearing, Nasopharynx clear, no masses noted in fossa of Rosenmuller. Eustachian tubes normal in appearance. Posterior aspect of uvula with healthy appearing mucosa. Posterior oropharyngeal wall clear with healthy mucosa. Base of tongue normal without masses. Epiglottis normal in appearance. Hypopharynx clear. Bilateral vocal cords mobile, mucosa healthy in appearance. Subglottis clear. Patient tolerated the procedure well.     Impression: Normal laryngoscopy.      ASSESSMENT AND PLAN:   60 year old female presenting for evaluation of dysphagia.    - Laryngoscopy normal  - VFSS ordered for further workup of dysphagia.  Will contact patient with results  - All questions answered    The patient indicates understanding of these issues and agrees to the plan.      Ailyn Sandra MD, PERLA  Facial Plastic & Reconstructive Surgery, Otolaryngology-Head & Neck Surgery  [facility]    This note was prepared using Dragon Medical voice recognition dictation software. As a result, errors may occur. When identified, these errors have been corrected. While every attempt is made to correct errors during dictation, discrepancies may still exist.          [1]   Current Outpatient Medications   Medication Sig Dispense Refill    atorvastatin 40 MG Oral Tab Take 1 tablet (40 mg total) by mouth nightly. 90 tablet 3    DOCUSATE SODIUM 100 MG Oral Cap TAKE 1 CAPSULE BY MOUTH TWICE DAILY 60 capsule 2    ergocalciferol 1.25 MG (14692 UT) Oral Cap Take 1 capsule (50,000 Units total) by mouth once a week. 6 capsule 0    ALPRAZOLAM 0.25 MG Oral Tab TAKE 1 TABLET(0.25 MG) BY MOUTH DAILY AS NEEDED 10 tablet 0    ZOLPIDEM 10 MG Oral Tab TAKE 1 TABLET(10 MG) BY MOUTH EVERY NIGHT AS NEEDED 30 tablet 0    methylPREDNISolone 4 MG Oral Tablet Therapy Pack Take per package insert (instructions). 21 tablet 0    PEG 3350-KCl-Na Bicarb-NaCl 420 g Oral Recon Soln Starting at 4:00 pm the night before  procedure, drink 8 ounces of the prep every 15-20 minutes until finished 1 each 0    gabapentin 300 MG Oral Cap Take 1 capsule (300 mg total) by mouth in the morning and 1 capsule (300 mg total) before bedtime. 40 capsule 0    metFORMIN  MG Oral Tablet 24 Hr Take 1 tablet (500 mg total) by mouth daily. 90 tablet 3    albuterol 108 (90 Base) MCG/ACT Inhalation Aero Soln Inhale 2 puffs into the lungs 4 (four) times daily as needed. 1 each 0    amLODIPine 5 MG Oral Tab TAKE 1 TABLET(5 MG) BY MOUTH DAILY 90 tablet 3    HYDROcodone-acetaminophen (NORCO) 5-325 MG Oral Tab Take 1 tablet by mouth every 6 (six) hours as needed for Pain. 15 tablet 0   [2]   Past Medical History:   Diabetes (HCC)    Diverticulosis of large intestine    Esophageal reflux    Essential hypertension    High blood pressure    High cholesterol    Osteoarthritis    Pneumonia due to organism    Stroke (HCC)    2008    Visual impairment    READING GLASSES   [3]   Social History  Socioeconomic History    Marital status: Legally    Tobacco Use    Smoking status: Every Day     Current packs/day: 0.50     Average packs/day: 0.5 packs/day for 43.6 years (21.8 ttl pk-yrs)     Types: Cigarettes     Start date: 1982     Passive exposure: Current    Smokeless tobacco: Never    Tobacco comments:     1-3 cigarettes a day      Updated 4/29/25   Vaping Use    Vaping status: Never Used   Substance and Sexual Activity    Alcohol use: Yes     Comment: occasional    Drug use: Never   Other Topics Concern    Caffeine Concern No    Exercise No    Seat Belt Yes    Special Diet No    Stress Concern No    Weight Concern No     Social Drivers of Health     Food Insecurity: No Food Insecurity (3/18/2025)    NCSS - Food Insecurity     Worried About Running Out of Food in the Last Year: No     Ran Out of Food in the Last Year: No   Transportation Needs: No Transportation Needs (3/18/2025)    NCSS - Transportation     Lack of Transportation: No   Housing  Stability: Not At Risk (3/18/2025)    NCSS - Housing/Utilities     Has Housing: Yes     Worried About Losing Housing: No     Unable to Get Utilities: No   [4]   Past Surgical History:  Procedure Laterality Date    Colectomy  03/20/2024    ROBOTIC LOW ANTERIOR COLON RESECTION    Colonoscopy N/A 09/07/2023    Procedure: COLONOSCOPY;  Surgeon: Evelyn Coles MD;  Location:  ENDOSCOPY    Colonoscopy N/A 4/22/2025    Procedure: COLONOSCOPY with forcep polypectomy;  Surgeon: Paulo Kearney MD;  Location:  ENDOSCOPY    Exploratory of abdomen      Knee replacement surgery

## 2025-07-24 ENCOUNTER — TELEPHONE (OUTPATIENT)
Facility: CLINIC | Age: 60
End: 2025-07-24

## 2025-07-24 NOTE — TELEPHONE ENCOUNTER
Patient called asking if Danica can give her an injection in her ankle on this appointment because of her pain   Future Appointments   Date Time Provider Department Center   8/6/2025 11:00 AM Germain Jones APRN [id number]     Patient is asking how soon she can get the injection with Dr. Green if Danica doesn't do it.

## 2025-07-24 NOTE — TELEPHONE ENCOUNTER
Patient was given right ankle injection 5/16/25 with Dr Dudley- Please advise if one when patient could receive repeat injection and if this is something that Danica could give.

## 2025-07-25 NOTE — TELEPHONE ENCOUNTER
Spoke with patient and let her know 4 months between shots. Advised she can do ice and ibuprofen/tylenol rotating and to try icing more often, being consistent. Only tried one time. Advised regular regimen will likely provide better relief. Verbalized understanding. She will keep appointment on 8/6 with Joanne.

## 2025-08-05 ENCOUNTER — OFFICE VISIT (OUTPATIENT)
Dept: INTERNAL MEDICINE CLINIC | Facility: CLINIC | Age: 60
End: 2025-08-05

## 2025-08-05 VITALS
DIASTOLIC BLOOD PRESSURE: 82 MMHG | WEIGHT: 176 LBS | SYSTOLIC BLOOD PRESSURE: 130 MMHG | TEMPERATURE: 98 F | HEIGHT: 67 IN | BODY MASS INDEX: 27.62 KG/M2 | RESPIRATION RATE: 18 BRPM

## 2025-08-05 DIAGNOSIS — R10.2 PELVIC CRAMPING: Primary | ICD-10-CM

## 2025-08-05 DIAGNOSIS — Z12.31 ENCOUNTER FOR SCREENING MAMMOGRAM FOR MALIGNANT NEOPLASM OF BREAST: ICD-10-CM

## 2025-08-05 DIAGNOSIS — E11.9 TYPE 2 DIABETES MELLITUS WITHOUT COMPLICATION, WITHOUT LONG-TERM CURRENT USE OF INSULIN (HCC): ICD-10-CM

## 2025-08-05 DIAGNOSIS — F41.9 ANXIETY: ICD-10-CM

## 2025-08-05 DIAGNOSIS — K57.32 SIGMOID DIVERTICULITIS: ICD-10-CM

## 2025-08-05 DIAGNOSIS — G89.18 POST-OPERATIVE PAIN: ICD-10-CM

## 2025-08-05 DIAGNOSIS — R10.2 PELVIC PRESSURE IN FEMALE: ICD-10-CM

## 2025-08-05 DIAGNOSIS — E78.2 MIXED HYPERLIPIDEMIA: ICD-10-CM

## 2025-08-05 DIAGNOSIS — G47.00 INSOMNIA, UNSPECIFIED TYPE: ICD-10-CM

## 2025-08-05 PROCEDURE — 99214 OFFICE O/P EST MOD 30 MIN: CPT

## 2025-08-05 RX ORDER — HYDROCODONE BITARTRATE AND ACETAMINOPHEN 5; 325 MG/1; MG/1
1 TABLET ORAL EVERY 6 HOURS PRN
Qty: 15 TABLET | Refills: 0 | Status: CANCELLED | OUTPATIENT
Start: 2025-08-05

## 2025-08-05 RX ORDER — AMLODIPINE BESYLATE 5 MG/1
5 TABLET ORAL DAILY
Qty: 90 TABLET | Refills: 1 | Status: SHIPPED | OUTPATIENT
Start: 2025-08-05

## 2025-08-05 RX ORDER — METFORMIN HYDROCHLORIDE 500 MG/1
500 TABLET, EXTENDED RELEASE ORAL DAILY
Qty: 90 TABLET | Refills: 1 | Status: SHIPPED | OUTPATIENT
Start: 2025-08-05

## 2025-08-05 RX ORDER — ATORVASTATIN CALCIUM 40 MG/1
40 TABLET, FILM COATED ORAL NIGHTLY
Qty: 90 TABLET | Refills: 1 | Status: SHIPPED | OUTPATIENT
Start: 2025-08-05

## 2025-08-05 RX ORDER — ZOLPIDEM TARTRATE 10 MG/1
10 TABLET ORAL NIGHTLY PRN
Qty: 30 TABLET | Refills: 0 | Status: CANCELLED | OUTPATIENT
Start: 2025-08-05

## 2025-08-05 RX ORDER — DOCUSATE SODIUM 100 MG/1
100 CAPSULE, LIQUID FILLED ORAL 2 TIMES DAILY
Qty: 60 CAPSULE | Refills: 2 | Status: SHIPPED | OUTPATIENT
Start: 2025-08-05

## 2025-08-05 RX ORDER — ALPRAZOLAM 0.25 MG
0.25 TABLET ORAL
Qty: 10 TABLET | Refills: 0 | Status: CANCELLED | OUTPATIENT
Start: 2025-08-05

## 2025-08-06 ENCOUNTER — HOSPITAL ENCOUNTER (OUTPATIENT)
Dept: GENERAL RADIOLOGY | Age: 60
Discharge: HOME | End: 2025-08-06

## 2025-08-06 ENCOUNTER — OFFICE VISIT (OUTPATIENT)
Facility: LOCATION | Age: 60
End: 2025-08-06

## 2025-08-06 DIAGNOSIS — S93.401S SPRAIN OF RIGHT ANKLE, UNSPECIFIED LIGAMENT, SEQUELA: ICD-10-CM

## 2025-08-06 DIAGNOSIS — M79.671 RIGHT FOOT PAIN: ICD-10-CM

## 2025-08-06 DIAGNOSIS — M25.374 FOOT JOINT INSTABILITY, RIGHT: ICD-10-CM

## 2025-08-06 DIAGNOSIS — M25.471 ANKLE SWELLING, RIGHT: ICD-10-CM

## 2025-08-06 DIAGNOSIS — M77.9 TENDONITIS: ICD-10-CM

## 2025-08-06 DIAGNOSIS — M19.071 ARTHRITIS OF RIGHT ANKLE: ICD-10-CM

## 2025-08-06 DIAGNOSIS — M25.571 RIGHT ANKLE PAIN, UNSPECIFIED CHRONICITY: Primary | ICD-10-CM

## 2025-08-06 RX ORDER — METHYLPREDNISOLONE 4 MG/1
TABLET ORAL
Qty: 21 TABLET | Refills: 0 | Status: SHIPPED | OUTPATIENT
Start: 2025-08-06

## 2025-08-12 DIAGNOSIS — G47.00 INSOMNIA, UNSPECIFIED TYPE: ICD-10-CM

## 2025-08-12 DIAGNOSIS — G89.18 POST-OPERATIVE PAIN: ICD-10-CM

## 2025-08-12 DIAGNOSIS — F41.9 ANXIETY: ICD-10-CM

## 2025-08-12 RX ORDER — ALPRAZOLAM 0.25 MG
0.25 TABLET ORAL
Qty: 10 TABLET | Refills: 0 | Status: SHIPPED | OUTPATIENT
Start: 2025-08-12

## 2025-08-12 RX ORDER — ZOLPIDEM TARTRATE 10 MG/1
10 TABLET ORAL NIGHTLY PRN
Qty: 30 TABLET | Refills: 0 | Status: SHIPPED | OUTPATIENT
Start: 2025-08-12

## 2025-08-12 RX ORDER — HYDROCODONE BITARTRATE AND ACETAMINOPHEN 5; 325 MG/1; MG/1
1 TABLET ORAL EVERY 6 HOURS PRN
Qty: 15 TABLET | Refills: 0 | Status: SHIPPED | OUTPATIENT
Start: 2025-08-12

## 2025-08-13 DIAGNOSIS — E55.9 VITAMIN D INSUFFICIENCY: ICD-10-CM

## 2025-08-18 RX ORDER — ERGOCALCIFEROL 1.25 MG/1
50000 CAPSULE, LIQUID FILLED ORAL WEEKLY
Qty: 6 CAPSULE | Refills: 0 | OUTPATIENT
Start: 2025-08-18

## 2025-08-27 ENCOUNTER — OFFICE VISIT (OUTPATIENT)
Facility: LOCATION | Age: 60
End: 2025-08-27

## 2025-08-27 DIAGNOSIS — M76.821 POSTERIOR TIBIAL TENDINITIS OF RIGHT LEG: ICD-10-CM

## 2025-08-27 DIAGNOSIS — M25.471 ANKLE SWELLING, RIGHT: ICD-10-CM

## 2025-08-27 DIAGNOSIS — M72.2 PLANTAR FASCIITIS OF RIGHT FOOT: Primary | ICD-10-CM

## 2025-08-27 DIAGNOSIS — R29.898 WEAKNESS OF FOOT, RIGHT: ICD-10-CM

## 2025-08-27 DIAGNOSIS — M25.571 RIGHT ANKLE PAIN, UNSPECIFIED CHRONICITY: ICD-10-CM

## 2025-08-27 DIAGNOSIS — E11.9 TYPE 2 DIABETES MELLITUS WITHOUT COMPLICATION, WITHOUT LONG-TERM CURRENT USE OF INSULIN (HCC): ICD-10-CM

## 2025-08-27 DIAGNOSIS — M19.071 ARTHRITIS OF RIGHT ANKLE: ICD-10-CM

## 2025-08-27 DIAGNOSIS — M25.374 FOOT JOINT INSTABILITY, RIGHT: ICD-10-CM

## (undated) DEVICE — PAIN TRAY: Brand: MEDLINE INDUSTRIES, INC.

## (undated) DEVICE — TIP-UP FENESTRATED GRASPER: Brand: ENDOWRIST

## (undated) DEVICE — SUT PROL 2-0 30IN SH NABSRB BLU L26MM 1/2 CIR

## (undated) DEVICE — VESSEL SEALER EXTEND: Brand: ENDOWRIST

## (undated) DEVICE — GIJAW SINGLE-USE BIOPSY FORCEPS WITH NEEDLE: Brand: GIJAW

## (undated) DEVICE — GLOVE,SURG,SENSICARE,ALOE,LF,PF,7: Brand: MEDLINE

## (undated) DEVICE — ADHESIVE SKIN TOP FOR WND CLSR DERMBND ADV

## (undated) DEVICE — COLUMN DRAPE

## (undated) DEVICE — 1200CC GUARDIAN II: Brand: GUARDIAN

## (undated) DEVICE — CIRCULAR MECH XL SEAL 29MM

## (undated) DEVICE — MARYLAND JAW OPEN SEALER/DIVIDER COATED 23CM: Brand: LIGASURE

## (undated) DEVICE — SUT ETHLN 2-0 18IN FS NABSRB BLK 26MM 3/8 CIR

## (undated) DEVICE — SUTURE VCRL SZ 0 L18IN ABSRB UD POLYGLACTIN

## (undated) DEVICE — DRAIN SUR 19FR L0.25IN 3/4 FLUT 3/16IN TRCR

## (undated) DEVICE — LAP COLON CDS: Brand: MEDLINE INDUSTRIES, INC.

## (undated) DEVICE — COVER LT HNDL RIG FOR SUR CAM DISP

## (undated) DEVICE — 40580 - THE PINK PAD - ADVANCED TRENDELENBURG POSITIONING KIT: Brand: 40580 - THE PINK PAD - ADVANCED TRENDELENBURG POSITIONING KIT

## (undated) DEVICE — STAPLER 60: Brand: SUREFORM

## (undated) DEVICE — GLOVE SUR 7.5 SENSICARE PIP WHT PWD F

## (undated) DEVICE — VALVE KIT SGL USE DEFENDO

## (undated) DEVICE — PERMANENT CAUTERY HOOK: Brand: ENDOWRIST

## (undated) DEVICE — BANDAGE ADH 1INX3IN NAT FAB N ADH PD CURAD

## (undated) DEVICE — BIOGUARD CLEANING ADAPTER

## (undated) DEVICE — KIT CUSTOM ENDOPROCEDURE STERIS

## (undated) DEVICE — V2 SPECIMEN COLLECTION MANIFOLD KIT: Brand: NEPTUNE

## (undated) DEVICE — SLEEVE COMPR MD KNEE LEN SGL USE KENDALL SCD

## (undated) DEVICE — FENESTRATED BIPOLAR FORCEPS: Brand: ENDOWRIST

## (undated) DEVICE — 10FT COMBINED O2 DELIVERY/CO2 MONITORING. FILTER WITH MICROSTREAM TYPE LUER: Brand: DUAL ADULT NASAL CANNULA

## (undated) DEVICE — SIGMOIDOSCOPE LIGHTED BIOSEAL

## (undated) DEVICE — SET CYSTO IRRIG L77IN DIA0.241IN BLDR NVENT

## (undated) DEVICE — CLOSING BUNDLE: Brand: MEDLINE INDUSTRIES, INC.

## (undated) DEVICE — AIRSEAL 5 MM ACCESS PORT AND LOW PROFILE OBTURATOR WITH BLADELESS OPTICAL TIP, 120 MM LENGTH: Brand: AIRSEAL

## (undated) DEVICE — SUPER ATRAUMATIC MODIFIED GRASPER TIP, DISPOSABLE: Brand: RENEW

## (undated) DEVICE — GLOVE SUR 7.5 SENSICARE PI PIP GRN PWD F

## (undated) DEVICE — ARM DRAPE

## (undated) DEVICE — SUT PDS II 0 36IN CT-1 ABSRB VLT L36MM 1/2

## (undated) DEVICE — WOUND RETRACTOR AND PROTECTOR: Brand: ALEXIS O WOUND PROTECTOR-RETRACTOR

## (undated) DEVICE — CANNULA SEAL

## (undated) DEVICE — GOWN,SIRUS,FABRIC-REINFORCED,X-LARGE: Brand: MEDLINE

## (undated) DEVICE — SUTURE PDS II 0 L27IN ABSRB VLT L26MM CT-2

## (undated) DEVICE — NEEDLE SPNL 22GA L3.5IN BLK QNCKE STYL DISP

## (undated) DEVICE — GLOVE SUR 7 SENSICARE PI PIP CRM PWD F

## (undated) DEVICE — STAPLER 60 RELOAD BLUE: Brand: SUREFORM

## (undated) DEVICE — VIOLET BRAIDED (POLYGLACTIN 910), SYNTHETIC ABSORBABLE SUTURE: Brand: COATED VICRYL

## (undated) DEVICE — SUT MCRYL 4-0 18IN PS-2 ABSRB UD 19MM 3/8 CIR

## (undated) DEVICE — BAG DRNGE 2000ML URIN INF CTRL ANTIREFLX

## (undated) DEVICE — VALVE AIR/H20 DEFENDO SCT BX

## (undated) DEVICE — HUNTER GASPER TIP, DISPOSABLE: Brand: RENEW

## (undated) DEVICE — COVER,MAYO STAND,STERILE: Brand: MEDLINE

## (undated) DEVICE — ENDOPATH ULTRA VERESS INSUFFLATION NEEDLES WITH LUER LOCK CONNECTORS: Brand: ENDOPATH

## (undated) DEVICE — SOLUTION IRRIG 3000ML 0.9% NACL FLX CONT

## (undated) DEVICE — EVACUATOR SUR 100CC SIL BLB WND

## (undated) DEVICE — FILTERLINE NASAL ADULT O2/CO2

## (undated) DEVICE — AIRSEAL TRI-LUMEN LILTERED TUBE SET: Brand: AIRSEAL

## (undated) DEVICE — STERIS KITS

## (undated) DEVICE — DRAPE,TOP,102X53,STERILE: Brand: MEDLINE

## (undated) DEVICE — LAPAROVUE VISIBILITY SYSTEM LAPAROSCOPIC SOLUTIONS: Brand: LAPAROVUE

## (undated) DEVICE — REDUCER: Brand: ENDOWRIST

## (undated) DEVICE — 3M™ RED DOT™ MONITORING ELECTRODE WITH FOAM TAPE AND STICKY GEL 2570-3, 3/BAG, 200/CASE, 54/PLT: Brand: RED DOT™

## (undated) DEVICE — GRABBER GRASPER TIP, DISPOSABLE: Brand: RENEW

## (undated) DEVICE — SKIN REG/FINE DUAL MARKER, RULER, LABELS: Brand: MEDLINE

## (undated) DEVICE — TRAY CATH 16FR F INCL BARDX IC COMPLT CARE

## (undated) DEVICE — 3M™ RED DOT™ MONITORING ELECTRODE WITH FOAM TAPE AND STICKY GEL, 50/BAG, 20/CASE, 72/PLT 2570: Brand: RED DOT™

## (undated) DEVICE — SEAL

## (undated) DEVICE — ENDOCUT SCISSOR TIP, DISPOSABLE: Brand: RENEW

## (undated) DEVICE — MEDI-VAC TUBING CONNECTOR 6-IN-1 "Y" POLYPROPYLENE: Brand: CARDINAL HEALTH

## (undated) DEVICE — CATHETER URETH 30FR BLLN 30CC LTX HYDRPHLC

## (undated) DEVICE — SKN PREP SPNG STKS PVP PNT STR: Brand: MEDLINE INDUSTRIES, INC.

## (undated) DEVICE — BLADELESS OBTURATOR: Brand: WECK VISTA

## (undated) DEVICE — REMOVER LOT 4OZ N IRRIG UNSCNT SFT MOIST LIQ

## (undated) NOTE — LETTER
AUTHORIZATION FOR SURGICAL OPERATION OR OTHER PROCEDURE    1. I hereby authorize Dr. Dr. Dustin Dudley, and [facility] staff assigned to my case to perform the following operation and/or procedure at the [facility] site:    ___Right ankle steroid injection____________________________________________________________      _______________________________________________________________________________________________    2.  My physician has explained the nature and purpose of the operation or other procedure, possible alternative methods of treatment, the risks involved, and the possibility of complication to me.  I acknowledge that no guarantee has been made as to the result that may be obtained.  3.  I recognize that, during the course of this operation, or other procedure, unforseen conditions may necessitate additional or different procedure than those listed above.  I, therefore, further authorize and request that the above named physician, his/her physician assistants or designees perform such procedures as are, in his/her professional opinion, necessary and desirable.  4.  Any tissue or organs removed in the operation or other procedure may be disposed of by and at the discretion of the [facility] and [facility].  5.  I understand that in the event of a medical emergency, I will be transported by local paramedics to [facility] or other hospital emergency department.  6.  I certify that I have read and fully understand the above consent to operation and/or other procedure.    7.  I acknowledge that my physician has explained sedation/analgesia administration to me including the risks and benefits.  I consent to the administration of sedation/analgesia as may be necessary or desirable in the judgement of my physician.    Witness signature: ___________________________________________________ Date:  ______/______/_____                    Time:   ________ A.M.  P.M.       Patient Name:  ______________________________________________________  (please print)      Patient signature:  ___________________________________________________             Relationship to Patient:           []  Parent    Responsible person                          []  Spouse  In case of minor or                    [] Other  _____________   Incompetent name:  __________________________________________________                               (please print)      _____________      Responsible person  In case of minor or  Incompetent signature:  _______________________________________________    Statement of Physician  My signature below affirms that prior to the time of the procedure, I have explained to the patient and/or his/her guardian, the risks and benefits involved in the proposed treatment and any reasonable alternative to the proposed treatment.  I have also explained the risks and benefits involved in the refusal of the proposed treatment and have answered the patient's questions.                        Date:  ______/______/_______  Provider                      Signature:  __________________________________________________________       Time:  ___________ A.M    P.M.

## (undated) NOTE — LETTER
9/9/2024    Sandra Roche  [address] [place] [address]    Dear Sandra,    We would like to inform you that your account has been charged $40 for not showing up to the office for your scheduled appointment on 9/9/24.    Our no-show policy is as follows: A 24-hour notice is required, or you may be charged a $40 No Show fee.      If you are unable to keep your scheduled appointment, please notify us at least 24 hours in advance so we can accommodate our other patients. You may also reschedule your appointment at that time.    On the third no-show, within a 12-month period, it will be the physician’s discretion as to whether a discharge letter will be sent out disengaging you from the practice and giving you 30 days to enroll with a new non [facility] physician.    If you would like to contest this charge, please call [phone].    Sincerely,  [facility]

## (undated) NOTE — LETTER
To:  Dr. Valle  Date:  2024  Patient Name: Sandra Roche-Age / Sex: 1965-A: 59 y  female  Medical Records: [id number]   [facility]: [id number]      Clearance for Surgery Requested by Surgeon    Request for:  Medical Clearance    Requested by Surgeon: Dr. Kearney    Surgical Date: 3/20/2024    Procedure: ROBOTIC LOW ANTERIOR COLON RESECTION, POSSIBLE OSTOMY, POSSIBLE OPEN, N/A      Please fax the clearance note to the Pre-Admission Testing department.  Thank you.

## (undated) NOTE — LETTER
Your patient was recently seen at the [facility] for a hospital follow-up visit. The visit note is attached. Please contact the clinic with any questions at [phone].     Thank you,  FRANKIE Katz

## (undated) NOTE — Clinical Note
Spoke with pt today for HFU.  Patient confirms specialist f/u appts, as scheduled. Patient agreeable to HFU (non-TCM) appt with you--this NCM unable to book within 1 week timeframe, per provider's protocol, d/t schedule restrictions--sent TE to office staff for appt clarifiicition and f/u. Thank you.

## (undated) NOTE — LETTER
AUTHORIZATION FOR SURGICAL OPERATION OR OTHER PROCEDURE    1. I hereby authorize Dr. Dustin Dudley, and [facility] staff assigned to my case to perform the following operation and/or procedure at the [facility] site:    _______________________________________________________________________________________________    Right hallux total nail avulsion  _______________________________________________________________________________________________    2.  My physician has explained the nature and purpose of the operation or other procedure, possible alternative methods of treatment, the risks involved, and the possibility of complication to me.  I acknowledge that no guarantee has been made as to the result that may be obtained.  3.  I recognize that, during the course of this operation, or other procedure, unforseen conditions may necessitate additional or different procedure than those listed above.  I, therefore, further authorize and request that the above named physician, his/her physician assistants or designees perform such procedures as are, in his/her professional opinion, necessary and desirable.  4.  Any tissue or organs removed in the operation or other procedure may be disposed of by and at the discretion of the [facility] and [facility].  5.  I understand that in the event of a medical emergency, I will be transported by local paramedics to [facility] or other hospital emergency department.  6.  I certify that I have read and fully understand the above consent to operation and/or other procedure.    7.  I acknowledge that my physician has explained sedation/analgesia administration to me including the risks and benefits.  I consent to the administration of sedation/analgesia as may be necessary or desirable in the judgement of my physician.    Witness signature: ___________________________________________________ Date:   ______/______/_____                    Time:  ________ A.M.  P.M.       Patient Name:  ______________________________________________________  (please print)      Patient signature:  ___________________________________________________             Relationship to Patient:           []  Parent    Responsible person                          []  Spouse  In case of minor or                    [] Other  _____________   Incompetent name:  __________________________________________________                               (please print)      _____________      Responsible person  In case of minor or  Incompetent signature:  _______________________________________________    Statement of Physician  My signature below affirms that prior to the time of the procedure, I have explained to the patient and/or his/her guardian, the risks and benefits involved in the proposed treatment and any reasonable alternative to the proposed treatment.  I have also explained the risks and benefits involved in the refusal of the proposed treatment and have answered the patient's questions.                        Date:  ______/______/_______  Provider                      Signature:  __________________________________________________________       Time:  ___________ A.M    P.M.

## (undated) NOTE — LETTER
Patient Name: Stormy Sharma  YOB: 1965          MRN number:  [id number]  Date:  12/4/2023  Referring Physician:  Ambika Ravi    Discharge Summary  I  Dear Dr. Celi Ibarra,    Discharge Summary  Pt has attended 10 visits in Physical Therapy. Subjective: Sandra reports improved LBP and only has the occasional pain when she has not been moving too much. She  feels ready for DC. Pain: 0/10 LBP currently       Objective:     From eval:   AROM: (* denotes performed with pain)  Flexion: 87 deg  Extension: 15 deg   Sidebending: R min limited, L min limited  Rotation: min to nil limited B       Strength: (* denotes performed with pain)  LE   Hip flexion (L2): R 5/5; L 5/5  Hip abduction: R 4/5; L 5/5  Hip Extension: R NT/5; L NT/5            Hip ER: R 5/5; L 5/5  Hip IR: R 5/5; L 5/5  Knee Flexion: R 5/5; L 5/5            Knee extension (L3): R 5/5; L 5/5            DF (L4): R 5/5; L 5/5  Great Toe Ext (L5): R 5/5, L 5/5  PF (S1): R NT/5; L NT/5          Assessment:     Sandra demonstrates improved B hip strength globally. Her AROM of her lumbar spine has improved in all directions since her eval and her tolerance to standing and sitting has improved. Her segmental mobility/accessory motion has improved greatly since her first day. She has met all her LTG at this time, her biggest limitation continues to be her limitations in her R knee AROM following TKA over a year ago. She is to be DC with [facility] and follow up with therapy as needed in the future. Goals:    (to be met in 6-8 visits)   Pt will improve transversus abdominis recruitment to perform proper isometric contraction without requiring verbal or tactile cuing to promote advancement of therex. Goal met. Pt will demonstrate good understanding of proper posture and body mechanics to decrease pain and improve spinal safety Goal met.   Pt will improve lumbar spine AROM SB and rot to min to nil limited deg to allow increase ease with bending forward to don shoes. Goal met. Pt will report improved symptom centralization and absence of radicular symptoms for 3 consecutive days to improve function with ADL   Pt will have decreased paraspinal mm tension to tolerate standing >30 minutes for work and home activities Goal met. Pt will demonstrate improved core strength to be able to perform squats with <2/10 pain. Goal met. Pt will be independent and compliant with comprehensive HEP to maintain progress achieved in PT  Goal met. Post Oswestry Disability Index Score  Post Score: 4 % (12/4/2023  2:06 PM)    -4 % improvement    Plan: DC with HEP. Patient/Family/Caregiver was advised of these findings, precautions, and treatment options and has agreed to actively participate in planning and for this course of care. Thank you for your referral. If you have any questions, please contact me at Dept: [phone]. Sincerely,  Electronically signed by therapist: Marlena Tamayo PT , DPT    Physician's certification required:  No  Please co-sign or sign and return this letter via fax as soon as possible to [phone]. I certify the need for these services furnished under this plan of treatment and while under my care. X___________________________________________________ Date____________________    Certification From: 22/9/8383  To:3/3/2024      21st Century Cures Act Notice to Patient: Medical documents like this are made available to patients in the interest of transparency. However, be advised this is a medical document and it is intended as iarj-ez-ordp communication between your medical providers. This medical document may contain abbreviations, assessments, medical data, and results or other terms that are unfamiliar. Medical documents are intended to carry relevant information, facts as evident, and the clinical opinion of the practitioner. As such, this medical document may be written in language that appears blunt or direct.  You are encouraged to contact your medical provider and/or [facility] if you have any questions about this medical document.

## (undated) NOTE — Clinical Note
I had the pleasure of seeing Sandra Roche on 1/10/2024.  Please see my attached note.  Evelyn Coles MD FACS [facility]--Surgery

## (undated) NOTE — Clinical Note
I had the pleasure of seeing Nidhi Degree on 7/17/2023. Please see my attached note.   Rayan Isabel MD FACS [facility]--Surgery

## (undated) NOTE — LETTER
24    Patient: Sandra Roche  : 1965 Visit date: 2/15/2024    Dear  Mariaelena Valle DO    Thank you for referring Sandra Roche to my practice.  Please find my assessment and plan below.     Assessment   1. Sigmoid diverticulitis        This is a very nice 59-year-old female previously under the care of of Dr. Coles in regards to her history of recurrent/smoldering diverticulitis.  Patient presents to clinic today to establish care with me.    Patient began having issues with diverticulitis in 2023.  She was admitted to the hospital on 2023 and improved with IV antibiotics.  Dr. Coles was able to perform a colonoscopy on the patient on 2023 which revealed a tortuous colon, multiple widemouth diverticula of the sigmoid and descending colon without any polyps or lesions.  Patient last followed up with Dr. Coles on 1/10/2024.    Over the last 2+ months, patient's had persistent left lower quadrant abdominal pain.  She also has constipation that she is controlling with MiraLAX and stool softeners.  She has had 2 recent CT scans on 2023 and 2024.  Both of these scans showed diverticulosis without diverticulitis.  No recent fevers, nausea or vomiting.  Dr. Coles prescribed her a course of Norco for pain control and was tentatively scheduling her for robotic low anterior colon resection.  Patient's had a history of a laparoscopic procedure for endometriosis.  She does not take any blood thinners.  She is currently unemployed.    I have personally reviewed the imaging of patient's 3 most recent CT scans.  I showed the imaging to the patient.  Though there is no obvious signs of inflammation around the sigmoid colon in the last 2 CT scans, there is some subtle thickening of the wall of the colon in this region suggestive of possible chronic diverticulitis or mild stricture.    Plan  I discussed treatment options with the patient.  She has failed to improve with antibiotic  therapy and bowel regimen.  She continues to have life-limiting left lower quadrant pain and I suspect she has had some degree of chronic/smoldering diverticulitis based on her symptoms and recent CT scans.  Therefore, I did offer her elective robotic low anterior colon resection, possible open, possible ostomy.    The details of surgery were discussed including the expected recovery time, risks, benefits and alternatives. Specific risks of surgery that were discussed included but not limited to pain, bleeding, infection, bowel or ureteral injury, and anastomotic complications such as leak, bleeding or stricture. There is also a risk of possible ostomy creation at the time of surgery. Patients are generally hospitalized for 2-5 days after this type of surgery.  I advise no lifting more than 10-15 pounds for total of 6 weeks after surgery.  Patient would remain on a soft/low fiber diet for 4 to 6 weeks after surgery.  She should complete a bowel prep with oral antibiotics per ERAS protocol prior to surgery.      Patient expressed understanding and wished to move forward with scheduling surgery.  Surgery has been scheduled for 3/20/2024.  Patient did request a refill of her Norco.  I agreed to give her 1 additional refill of the Norco but informed her I would not be able to provide any further narcotic pain medication until after surgery.  Patient expressed understanding.      Sincerely,       Paulo Kearney MD   CC:   No Recipients

## (undated) NOTE — LETTER
Paulo Kearney MD    Surgical Clearance Needed    Date: 2/15/2024                                                                       From: Charlette    Attn: Dr. Valle                                                                                    RE: Surgical Clearance               # of Pages: 1        Patient Name: Sandra Roche    Patient YOB: 1965    Consult For: Medical Clearance    Surgery: ROBOTIC LOW ANTERIOR COLON RESECTION, POSSIBLE OSTOMY, POSSIBLE OPEN    Date of Surgery: 3/20/24 at [facility]         This facsimile transmission is provided for your internal use only. If the reader of this message is not the intended recipient, you are hereby notified that you have received this document in error, and that any review, dissemination, distribution, or copying of this message is strictly prohibited. If you have received this communication in error, please notify us immediately by telephone and return the original message to us by mail.

## (undated) NOTE — Clinical Note
I had the pleasure of seeing Alison Billingsley on 8/16/2023. Please see my attached note.   Yulissa Corado MD FACS [facility]--Surgery